# Patient Record
Sex: MALE | Race: WHITE | NOT HISPANIC OR LATINO | Employment: FULL TIME | URBAN - METROPOLITAN AREA
[De-identification: names, ages, dates, MRNs, and addresses within clinical notes are randomized per-mention and may not be internally consistent; named-entity substitution may affect disease eponyms.]

---

## 2017-01-25 ENCOUNTER — ALLSCRIPTS OFFICE VISIT (OUTPATIENT)
Dept: OTHER | Facility: OTHER | Age: 32
End: 2017-01-25

## 2017-01-25 ENCOUNTER — TRANSCRIBE ORDERS (OUTPATIENT)
Dept: ADMINISTRATIVE | Facility: HOSPITAL | Age: 32
End: 2017-01-25

## 2017-01-25 DIAGNOSIS — M25.552 PAIN IN LEFT HIP: ICD-10-CM

## 2017-01-25 DIAGNOSIS — M16.12 PRIMARY OSTEOARTHRITIS OF LEFT HIP: ICD-10-CM

## 2017-01-25 DIAGNOSIS — M25.552 LEFT HIP PAIN: Primary | ICD-10-CM

## 2017-02-15 RX ORDER — CYCLOBENZAPRINE HCL 10 MG
10 TABLET ORAL 2 TIMES DAILY PRN
COMMUNITY
End: 2018-10-12 | Stop reason: SDUPTHER

## 2017-02-16 ENCOUNTER — HOSPITAL ENCOUNTER (OUTPATIENT)
Dept: MRI IMAGING | Facility: HOSPITAL | Age: 32
Discharge: HOME | End: 2017-02-16
Attending: ORTHOPAEDIC SURGERY
Payer: COMMERCIAL

## 2017-02-16 ENCOUNTER — HOSPITAL ENCOUNTER (OUTPATIENT)
Dept: RADIOLOGY | Facility: HOSPITAL | Age: 32
Discharge: HOME | End: 2017-02-16
Attending: ORTHOPAEDIC SURGERY
Payer: COMMERCIAL

## 2017-02-16 DIAGNOSIS — M25.552 LEFT HIP PAIN: ICD-10-CM

## 2017-02-16 DIAGNOSIS — M25.552 PAIN IN LEFT HIP: ICD-10-CM

## 2017-02-16 PROCEDURE — A9585 GADOBUTROL INJECTION: HCPCS | Performed by: ORTHOPAEDIC SURGERY

## 2017-02-16 PROCEDURE — 20610 DRAIN/INJ JOINT/BURSA W/O US: CPT

## 2017-02-16 PROCEDURE — 77002 NEEDLE LOCALIZATION BY XRAY: CPT

## 2017-02-16 PROCEDURE — 73722 MRI JOINT OF LWR EXTR W/DYE: CPT

## 2017-02-16 RX ORDER — 0.9 % SODIUM CHLORIDE 0.9 %
2.5 VIAL (ML) INJECTION
Status: COMPLETED | OUTPATIENT
Start: 2017-02-16 | End: 2017-02-16

## 2017-02-16 RX ORDER — LIDOCAINE HYDROCHLORIDE 10 MG/ML
7 INJECTION, SOLUTION INFILTRATION; PERINEURAL
Status: COMPLETED | OUTPATIENT
Start: 2017-02-16 | End: 2017-02-16

## 2017-02-16 RX ADMIN — GADOBUTROL 0.2 ML: 604.72 INJECTION INTRAVENOUS at 13:58

## 2017-02-16 RX ADMIN — SODIUM CHLORIDE 2.5 ML: 9 INJECTION, SOLUTION INTRAMUSCULAR; INTRAVENOUS; SUBCUTANEOUS at 13:10

## 2017-02-16 RX ADMIN — LIDOCAINE HYDROCHLORIDE 7 ML: 10 INJECTION, SOLUTION INFILTRATION; PERINEURAL at 13:10

## 2017-02-16 RX ADMIN — IOHEXOL 2 ML: 300 INJECTION, SOLUTION INTRAVENOUS at 13:10

## 2017-02-21 ENCOUNTER — ALLSCRIPTS OFFICE VISIT (OUTPATIENT)
Dept: OTHER | Facility: OTHER | Age: 32
End: 2017-02-21

## 2017-02-21 ENCOUNTER — TRANSCRIBE ORDERS (OUTPATIENT)
Dept: ADMINISTRATIVE | Facility: HOSPITAL | Age: 32
End: 2017-02-21

## 2017-02-21 DIAGNOSIS — M16.12 PRIMARY OSTEOARTHRITIS OF LEFT HIP: Primary | ICD-10-CM

## 2017-02-27 ENCOUNTER — HOSPITAL ENCOUNTER (OUTPATIENT)
Dept: RADIOLOGY | Facility: HOSPITAL | Age: 32
Discharge: HOME | End: 2017-02-27
Attending: ORTHOPAEDIC SURGERY | Admitting: RADIOLOGY
Payer: COMMERCIAL

## 2017-02-27 DIAGNOSIS — M16.12 PRIMARY OSTEOARTHRITIS OF LEFT HIP: ICD-10-CM

## 2017-02-27 PROCEDURE — 77002 NEEDLE LOCALIZATION BY XRAY: CPT

## 2017-02-27 PROCEDURE — 20610 DRAIN/INJ JOINT/BURSA W/O US: CPT

## 2017-02-27 RX ORDER — LIDOCAINE HYDROCHLORIDE 10 MG/ML
3 INJECTION, SOLUTION EPIDURAL; INFILTRATION; INTRACAUDAL; PERINEURAL ONCE
Status: COMPLETED | OUTPATIENT
Start: 2017-02-27 | End: 2017-02-27

## 2017-02-27 RX ORDER — BUPIVACAINE HYDROCHLORIDE 2.5 MG/ML
3 INJECTION, SOLUTION EPIDURAL; INFILTRATION; INTRACAUDAL ONCE
Status: COMPLETED | OUTPATIENT
Start: 2017-02-27 | End: 2017-02-27

## 2017-02-27 RX ORDER — METHYLPREDNISOLONE ACETATE 80 MG/ML
80 INJECTION, SUSPENSION INTRA-ARTICULAR; INTRALESIONAL; INTRAMUSCULAR; SOFT TISSUE ONCE
Status: COMPLETED | OUTPATIENT
Start: 2017-02-27 | End: 2017-02-27

## 2017-02-27 RX ADMIN — BUPIVACAINE HYDROCHLORIDE 3 ML: 2.5 INJECTION, SOLUTION EPIDURAL; INFILTRATION; INTRACAUDAL; PERINEURAL at 14:15

## 2017-02-27 RX ADMIN — IOHEXOL 3 ML: 300 INJECTION, SOLUTION INTRAVENOUS at 14:16

## 2017-02-27 RX ADMIN — LIDOCAINE HYDROCHLORIDE 3 ML: 10 INJECTION, SOLUTION EPIDURAL; INFILTRATION; INTRACAUDAL; PERINEURAL at 14:16

## 2017-02-27 RX ADMIN — METHYLPREDNISOLONE ACETATE 80 MG: 80 INJECTION, SUSPENSION INTRA-ARTICULAR; INTRALESIONAL; INTRAMUSCULAR; SOFT TISSUE at 14:16

## 2017-04-25 ENCOUNTER — ALLSCRIPTS OFFICE VISIT (OUTPATIENT)
Dept: OTHER | Facility: OTHER | Age: 32
End: 2017-04-25

## 2017-05-15 ENCOUNTER — APPOINTMENT (OUTPATIENT)
Dept: PREADMISSION TESTING | Facility: HOSPITAL | Age: 32
End: 2017-05-15
Payer: COMMERCIAL

## 2017-05-15 ENCOUNTER — APPOINTMENT (OUTPATIENT)
Dept: LAB | Facility: CLINIC | Age: 32
End: 2017-05-15
Payer: COMMERCIAL

## 2017-05-15 ENCOUNTER — TRANSCRIBE ORDERS (OUTPATIENT)
Dept: LAB | Facility: CLINIC | Age: 32
End: 2017-05-15

## 2017-05-15 DIAGNOSIS — S73.192A TEAR OF LEFT ACETABULAR LABRUM, INITIAL ENCOUNTER: Primary | ICD-10-CM

## 2017-05-15 DIAGNOSIS — S73.192A TEAR OF LEFT ACETABULAR LABRUM, INITIAL ENCOUNTER: ICD-10-CM

## 2017-05-15 DIAGNOSIS — M16.12 PRIMARY OSTEOARTHRITIS OF LEFT HIP: ICD-10-CM

## 2017-05-15 DIAGNOSIS — M25.552 PAIN IN LEFT HIP: ICD-10-CM

## 2017-05-15 LAB
ANION GAP SERPL CALCULATED.3IONS-SCNC: 9 MMOL/L (ref 4–13)
BASOPHILS # BLD AUTO: 0.02 THOUSANDS/ΜL (ref 0–0.1)
BASOPHILS NFR BLD AUTO: 0 % (ref 0–1)
BUN SERPL-MCNC: 15 MG/DL (ref 5–25)
CALCIUM SERPL-MCNC: 9.4 MG/DL (ref 8.3–10.1)
CHLORIDE SERPL-SCNC: 101 MMOL/L (ref 100–108)
CO2 SERPL-SCNC: 29 MMOL/L (ref 21–32)
CREAT SERPL-MCNC: 0.96 MG/DL (ref 0.6–1.3)
CRP SERPL QL: 25.6 MG/L
EOSINOPHIL # BLD AUTO: 0.32 THOUSAND/ΜL (ref 0–0.61)
EOSINOPHIL NFR BLD AUTO: 4 % (ref 0–6)
ERYTHROCYTE [DISTWIDTH] IN BLOOD BY AUTOMATED COUNT: 15.1 % (ref 11.6–15.1)
ERYTHROCYTE [SEDIMENTATION RATE] IN BLOOD: 12 MM/HOUR (ref 0–10)
GFR SERPL CREATININE-BSD FRML MDRD: >60 ML/MIN/1.73SQ M
GLUCOSE SERPL-MCNC: 93 MG/DL (ref 65–140)
HCT VFR BLD AUTO: 46.3 % (ref 36.5–49.3)
HGB BLD-MCNC: 14.8 G/DL (ref 12–17)
LYMPHOCYTES # BLD AUTO: 1.77 THOUSANDS/ΜL (ref 0.6–4.47)
LYMPHOCYTES NFR BLD AUTO: 22 % (ref 14–44)
MCH RBC QN AUTO: 25.6 PG (ref 26.8–34.3)
MCHC RBC AUTO-ENTMCNC: 32 G/DL (ref 31.4–37.4)
MCV RBC AUTO: 80 FL (ref 82–98)
MONOCYTES # BLD AUTO: 0.81 THOUSAND/ΜL (ref 0.17–1.22)
MONOCYTES NFR BLD AUTO: 10 % (ref 4–12)
NEUTROPHILS # BLD AUTO: 4.97 THOUSANDS/ΜL (ref 1.85–7.62)
NEUTS SEG NFR BLD AUTO: 64 % (ref 43–75)
PLATELET # BLD AUTO: 332 THOUSANDS/UL (ref 149–390)
PMV BLD AUTO: 8.4 FL (ref 8.9–12.7)
POTASSIUM SERPL-SCNC: 3.9 MMOL/L (ref 3.5–5.3)
RBC # BLD AUTO: 5.78 MILLION/UL (ref 3.88–5.62)
SODIUM SERPL-SCNC: 139 MMOL/L (ref 136–145)
WBC # BLD AUTO: 7.89 THOUSAND/UL (ref 4.31–10.16)

## 2017-05-15 PROCEDURE — 36415 COLL VENOUS BLD VENIPUNCTURE: CPT

## 2017-05-15 PROCEDURE — 85025 COMPLETE CBC W/AUTO DIFF WBC: CPT

## 2017-05-15 PROCEDURE — 85652 RBC SED RATE AUTOMATED: CPT

## 2017-05-15 PROCEDURE — 86430 RHEUMATOID FACTOR TEST QUAL: CPT

## 2017-05-15 PROCEDURE — 86038 ANTINUCLEAR ANTIBODIES: CPT

## 2017-05-15 PROCEDURE — 86200 CCP ANTIBODY: CPT

## 2017-05-15 PROCEDURE — 81374 HLA I TYPING 1 ANTIGEN LR: CPT

## 2017-05-15 PROCEDURE — 86140 C-REACTIVE PROTEIN: CPT

## 2017-05-15 PROCEDURE — 80048 BASIC METABOLIC PNL TOTAL CA: CPT

## 2017-05-16 LAB — RHEUMATOID FACT SER QL LA: NEGATIVE

## 2017-05-17 LAB
CCP IGA+IGG SERPL IA-ACNC: 25 UNITS (ref 0–19)
RYE IGE QN: NEGATIVE

## 2017-05-22 LAB — HLA-B27 QL NAA+PROBE: POSITIVE

## 2017-06-01 ENCOUNTER — APPOINTMENT (OUTPATIENT)
Dept: RADIOLOGY | Facility: HOSPITAL | Age: 32
End: 2017-06-01
Payer: COMMERCIAL

## 2017-06-01 ENCOUNTER — ANESTHESIA EVENT (OUTPATIENT)
Dept: PERIOP | Facility: HOSPITAL | Age: 32
End: 2017-06-01
Payer: COMMERCIAL

## 2017-06-01 ENCOUNTER — ANESTHESIA (OUTPATIENT)
Dept: PERIOP | Facility: HOSPITAL | Age: 32
End: 2017-06-01
Payer: COMMERCIAL

## 2017-06-01 ENCOUNTER — HOSPITAL ENCOUNTER (OUTPATIENT)
Facility: HOSPITAL | Age: 32
Setting detail: OUTPATIENT SURGERY
Discharge: HOME | End: 2017-06-01
Attending: ORTHOPAEDIC SURGERY | Admitting: ORTHOPAEDIC SURGERY
Payer: COMMERCIAL

## 2017-06-01 VITALS
TEMPERATURE: 97.4 F | RESPIRATION RATE: 18 BRPM | HEIGHT: 72 IN | SYSTOLIC BLOOD PRESSURE: 110 MMHG | HEART RATE: 77 BPM | DIASTOLIC BLOOD PRESSURE: 56 MMHG | OXYGEN SATURATION: 98 % | WEIGHT: 145 LBS | BODY MASS INDEX: 19.64 KG/M2

## 2017-06-01 PROCEDURE — 73501 X-RAY EXAM HIP UNI 1 VIEW: CPT

## 2017-06-01 RX ORDER — HYDROMORPHONE HYDROCHLORIDE 2 MG/ML
INJECTION, SOLUTION INTRAMUSCULAR; INTRAVENOUS; SUBCUTANEOUS AS NEEDED
Status: DISCONTINUED | OUTPATIENT
Start: 2017-06-01 | End: 2017-06-01 | Stop reason: SURG

## 2017-06-01 RX ORDER — LIDOCAINE HYDROCHLORIDE 10 MG/ML
INJECTION, SOLUTION INFILTRATION; PERINEURAL AS NEEDED
Status: DISCONTINUED | OUTPATIENT
Start: 2017-06-01 | End: 2017-06-01

## 2017-06-01 RX ORDER — FENTANYL CITRATE/PF 50 MCG/ML
25 SYRINGE (ML) INJECTION
Status: DISCONTINUED | OUTPATIENT
Start: 2017-06-01 | End: 2017-06-01 | Stop reason: HOSPADM

## 2017-06-01 RX ORDER — MORPHINE SULFATE 2 MG/ML
2 INJECTION, SOLUTION INTRAMUSCULAR; INTRAVENOUS EVERY 2 HOUR PRN
Status: DISCONTINUED | OUTPATIENT
Start: 2017-06-01 | End: 2017-06-01 | Stop reason: HOSPADM

## 2017-06-01 RX ORDER — OXYCODONE HYDROCHLORIDE AND ACETAMINOPHEN 5; 325 MG/1; MG/1
2 TABLET ORAL EVERY 4 HOURS PRN
Status: DISCONTINUED | OUTPATIENT
Start: 2017-06-01 | End: 2017-06-01 | Stop reason: HOSPADM

## 2017-06-01 RX ORDER — PREGABALIN 75 MG/1
75 CAPSULE ORAL ONCE
Status: COMPLETED | OUTPATIENT
Start: 2017-06-01 | End: 2017-06-01

## 2017-06-01 RX ORDER — OXYCODONE HYDROCHLORIDE AND ACETAMINOPHEN 5; 325 MG/1; MG/1
TABLET ORAL
Status: COMPLETED
Start: 2017-06-01 | End: 2017-06-01

## 2017-06-01 RX ORDER — KETAMINE HYDROCHLORIDE 50 MG/ML
INJECTION, SOLUTION, CONCENTRATE INTRAMUSCULAR; INTRAVENOUS AS NEEDED
Status: DISCONTINUED | OUTPATIENT
Start: 2017-06-01 | End: 2017-06-01 | Stop reason: SURG

## 2017-06-01 RX ORDER — SODIUM CHLORIDE, SODIUM LACTATE, POTASSIUM CHLORIDE, CALCIUM CHLORIDE 600; 310; 30; 20 MG/100ML; MG/100ML; MG/100ML; MG/100ML
50 INJECTION, SOLUTION INTRAVENOUS CONTINUOUS
Status: DISCONTINUED | OUTPATIENT
Start: 2017-06-01 | End: 2017-06-01 | Stop reason: HOSPADM

## 2017-06-01 RX ORDER — ONDANSETRON 2 MG/ML
4 INJECTION INTRAMUSCULAR; INTRAVENOUS EVERY 4 HOURS PRN
Status: DISCONTINUED | OUTPATIENT
Start: 2017-06-01 | End: 2017-06-01 | Stop reason: HOSPADM

## 2017-06-01 RX ORDER — ACETAMINOPHEN 325 MG/1
975 TABLET ORAL ONCE
Status: COMPLETED | OUTPATIENT
Start: 2017-06-01 | End: 2017-06-01

## 2017-06-01 RX ORDER — FENTANYL CITRATE 50 UG/ML
INJECTION, SOLUTION INTRAMUSCULAR; INTRAVENOUS AS NEEDED
Status: DISCONTINUED | OUTPATIENT
Start: 2017-06-01 | End: 2017-06-01 | Stop reason: SURG

## 2017-06-01 RX ORDER — SODIUM CHLORIDE, SODIUM LACTATE, POTASSIUM CHLORIDE, CALCIUM CHLORIDE 600; 310; 30; 20 MG/100ML; MG/100ML; MG/100ML; MG/100ML
50 INJECTION, SOLUTION INTRAVENOUS CONTINUOUS
Status: CANCELLED | OUTPATIENT
Start: 2017-06-01

## 2017-06-01 RX ORDER — ONDANSETRON 2 MG/ML
INJECTION INTRAMUSCULAR; INTRAVENOUS AS NEEDED
Status: DISCONTINUED | OUTPATIENT
Start: 2017-06-01 | End: 2017-06-01 | Stop reason: SURG

## 2017-06-01 RX ORDER — MIDAZOLAM HYDROCHLORIDE 1 MG/ML
INJECTION INTRAMUSCULAR; INTRAVENOUS AS NEEDED
Status: DISCONTINUED | OUTPATIENT
Start: 2017-06-01 | End: 2017-06-01 | Stop reason: SURG

## 2017-06-01 RX ORDER — KETOROLAC TROMETHAMINE 30 MG/ML
INJECTION, SOLUTION INTRAMUSCULAR; INTRAVENOUS AS NEEDED
Status: DISCONTINUED | OUTPATIENT
Start: 2017-06-01 | End: 2017-06-01 | Stop reason: SURG

## 2017-06-01 RX ORDER — PROPOFOL 10 MG/ML
INJECTION, EMULSION INTRAVENOUS AS NEEDED
Status: DISCONTINUED | OUTPATIENT
Start: 2017-06-01 | End: 2017-06-01 | Stop reason: SURG

## 2017-06-01 RX ORDER — DEXMEDETOMIDINE HYDROCHLORIDE 100 UG/ML
INJECTION, SOLUTION INTRAVENOUS AS NEEDED
Status: DISCONTINUED | OUTPATIENT
Start: 2017-06-01 | End: 2017-06-01 | Stop reason: SURG

## 2017-06-01 RX ORDER — OXYCODONE HYDROCHLORIDE AND ACETAMINOPHEN 5; 325 MG/1; MG/1
TABLET ORAL
Qty: 40 TABLET | Refills: 0 | Status: SHIPPED | OUTPATIENT
Start: 2017-06-01 | End: 2018-07-10

## 2017-06-01 RX ORDER — LIDOCAINE HYDROCHLORIDE AND EPINEPHRINE 10; 10 MG/ML; UG/ML
INJECTION, SOLUTION INFILTRATION; PERINEURAL AS NEEDED
Status: DISCONTINUED | OUTPATIENT
Start: 2017-06-01 | End: 2017-06-01 | Stop reason: HOSPADM

## 2017-06-01 RX ORDER — ACETAMINOPHEN 325 MG/1
650 TABLET ORAL EVERY 4 HOURS PRN
Status: DISCONTINUED | OUTPATIENT
Start: 2017-06-01 | End: 2017-06-01 | Stop reason: HOSPADM

## 2017-06-01 RX ORDER — ONDANSETRON 2 MG/ML
4 INJECTION INTRAMUSCULAR; INTRAVENOUS EVERY 6 HOURS PRN
Status: DISCONTINUED | OUTPATIENT
Start: 2017-06-01 | End: 2017-06-01 | Stop reason: HOSPADM

## 2017-06-01 RX ORDER — GLYCOPYRROLATE 0.2 MG/ML
INJECTION INTRAMUSCULAR; INTRAVENOUS AS NEEDED
Status: DISCONTINUED | OUTPATIENT
Start: 2017-06-01 | End: 2017-06-01 | Stop reason: SURG

## 2017-06-01 RX ORDER — LIDOCAINE HYDROCHLORIDE 10 MG/ML
INJECTION, SOLUTION INFILTRATION; PERINEURAL AS NEEDED
Status: DISCONTINUED | OUTPATIENT
Start: 2017-06-01 | End: 2017-06-01 | Stop reason: SURG

## 2017-06-01 RX ADMIN — KETAMINE HYDROCHLORIDE 30 MG: 50 INJECTION INTRAMUSCULAR; INTRAVENOUS at 12:50

## 2017-06-01 RX ADMIN — OXYCODONE HYDROCHLORIDE AND ACETAMINOPHEN 1 TABLET: 5; 325 TABLET ORAL at 14:57

## 2017-06-01 RX ADMIN — CEFAZOLIN SODIUM 1000 MG: 1 SOLUTION INTRAVENOUS at 12:45

## 2017-06-01 RX ADMIN — FENTANYL CITRATE 25 MCG: 50 INJECTION INTRAMUSCULAR; INTRAVENOUS at 12:55

## 2017-06-01 RX ADMIN — HYDROMORPHONE HYDROCHLORIDE 0.2 MG: 2 INJECTION, SOLUTION INTRAMUSCULAR; INTRAVENOUS; SUBCUTANEOUS at 13:44

## 2017-06-01 RX ADMIN — DEXAMETHASONE SODIUM PHOSPHATE 10 MG: 10 INJECTION INTRAMUSCULAR; INTRAVENOUS at 12:47

## 2017-06-01 RX ADMIN — FENTANYL CITRATE 25 MCG: 50 INJECTION INTRAMUSCULAR; INTRAVENOUS at 12:59

## 2017-06-01 RX ADMIN — PREGABALIN 75 MG: 75 CAPSULE ORAL at 11:59

## 2017-06-01 RX ADMIN — PROPOFOL 200 MG: 10 INJECTION, EMULSION INTRAVENOUS at 12:49

## 2017-06-01 RX ADMIN — FENTANYL CITRATE 25 MCG: 50 INJECTION INTRAMUSCULAR; INTRAVENOUS at 13:30

## 2017-06-01 RX ADMIN — HYDROMORPHONE HYDROCHLORIDE 0.4 MG: 2 INJECTION, SOLUTION INTRAMUSCULAR; INTRAVENOUS; SUBCUTANEOUS at 13:56

## 2017-06-01 RX ADMIN — ACETAMINOPHEN 975 MG: 325 TABLET, FILM COATED ORAL at 11:59

## 2017-06-01 RX ADMIN — KETOROLAC TROMETHAMINE 30 MG: 30 INJECTION, SOLUTION INTRAMUSCULAR at 13:47

## 2017-06-01 RX ADMIN — HYDROMORPHONE HYDROCHLORIDE 0.2 MG: 2 INJECTION, SOLUTION INTRAMUSCULAR; INTRAVENOUS; SUBCUTANEOUS at 13:46

## 2017-06-01 RX ADMIN — MIDAZOLAM HYDROCHLORIDE 2 MG: 1 INJECTION, SOLUTION INTRAMUSCULAR; INTRAVENOUS at 12:46

## 2017-06-01 RX ADMIN — FENTANYL CITRATE 25 MCG: 50 INJECTION INTRAMUSCULAR; INTRAVENOUS at 13:25

## 2017-06-01 RX ADMIN — HYDROMORPHONE HYDROCHLORIDE 0.2 MG: 2 INJECTION, SOLUTION INTRAMUSCULAR; INTRAVENOUS; SUBCUTANEOUS at 13:32

## 2017-06-01 RX ADMIN — PROPOFOL 30 MG: 10 INJECTION, EMULSION INTRAVENOUS at 13:10

## 2017-06-01 RX ADMIN — SODIUM CHLORIDE, SODIUM LACTATE, POTASSIUM CHLORIDE, AND CALCIUM CHLORIDE: .6; .31; .03; .02 INJECTION, SOLUTION INTRAVENOUS at 12:32

## 2017-06-01 RX ADMIN — DEXMEDETOMIDINE 32 MCG: 100 INJECTION, SOLUTION, CONCENTRATE INTRAVENOUS at 12:56

## 2017-06-01 RX ADMIN — LIDOCAINE HYDROCHLORIDE 25 MG: 10 INJECTION, SOLUTION INFILTRATION; PERINEURAL at 12:49

## 2017-06-01 RX ADMIN — ONDANSETRON 4 MG: 2 INJECTION INTRAMUSCULAR; INTRAVENOUS at 12:49

## 2017-06-01 RX ADMIN — GLYCOPYRROLATE 0.2 MG: 0.2 INJECTION INTRAMUSCULAR; INTRAVENOUS at 12:46

## 2017-06-13 ENCOUNTER — ALLSCRIPTS OFFICE VISIT (OUTPATIENT)
Dept: OTHER | Facility: OTHER | Age: 32
End: 2017-06-13

## 2017-06-13 DIAGNOSIS — M16.12 PRIMARY OSTEOARTHRITIS OF LEFT HIP: ICD-10-CM

## 2017-06-13 DIAGNOSIS — S73.192D OTHER SPRAIN OF LEFT HIP, SUBSEQUENT ENCOUNTER: ICD-10-CM

## 2017-06-19 ENCOUNTER — GENERIC CONVERSION - ENCOUNTER (OUTPATIENT)
Dept: OTHER | Facility: OTHER | Age: 32
End: 2017-06-19

## 2017-06-19 ENCOUNTER — APPOINTMENT (OUTPATIENT)
Dept: PHYSICAL THERAPY | Age: 32
End: 2017-06-19
Payer: COMMERCIAL

## 2017-06-19 DIAGNOSIS — M16.12 PRIMARY OSTEOARTHRITIS OF LEFT HIP: ICD-10-CM

## 2017-06-19 DIAGNOSIS — S73.192D OTHER SPRAIN OF LEFT HIP, SUBSEQUENT ENCOUNTER: ICD-10-CM

## 2017-06-19 PROCEDURE — 97162 PT EVAL MOD COMPLEX 30 MIN: CPT

## 2017-06-19 PROCEDURE — 97110 THERAPEUTIC EXERCISES: CPT

## 2017-06-21 ENCOUNTER — APPOINTMENT (OUTPATIENT)
Dept: PHYSICAL THERAPY | Age: 32
End: 2017-06-21
Payer: COMMERCIAL

## 2017-06-21 PROCEDURE — 97140 MANUAL THERAPY 1/> REGIONS: CPT

## 2017-06-21 PROCEDURE — 97110 THERAPEUTIC EXERCISES: CPT

## 2017-06-21 PROCEDURE — 97112 NEUROMUSCULAR REEDUCATION: CPT

## 2017-06-23 ENCOUNTER — APPOINTMENT (OUTPATIENT)
Dept: PHYSICAL THERAPY | Age: 32
End: 2017-06-23
Payer: COMMERCIAL

## 2017-06-23 PROCEDURE — 97140 MANUAL THERAPY 1/> REGIONS: CPT

## 2017-06-23 PROCEDURE — 97110 THERAPEUTIC EXERCISES: CPT

## 2017-06-23 PROCEDURE — 97112 NEUROMUSCULAR REEDUCATION: CPT

## 2017-06-26 ENCOUNTER — APPOINTMENT (OUTPATIENT)
Dept: PHYSICAL THERAPY | Age: 32
End: 2017-06-26
Payer: COMMERCIAL

## 2017-06-26 PROCEDURE — 97112 NEUROMUSCULAR REEDUCATION: CPT

## 2017-06-26 PROCEDURE — 97140 MANUAL THERAPY 1/> REGIONS: CPT

## 2017-06-26 PROCEDURE — 97110 THERAPEUTIC EXERCISES: CPT

## 2017-06-27 ENCOUNTER — APPOINTMENT (OUTPATIENT)
Dept: PHYSICAL THERAPY | Age: 32
End: 2017-06-27
Payer: COMMERCIAL

## 2017-06-27 PROCEDURE — 97112 NEUROMUSCULAR REEDUCATION: CPT

## 2017-06-27 PROCEDURE — 97140 MANUAL THERAPY 1/> REGIONS: CPT

## 2017-06-27 PROCEDURE — 97110 THERAPEUTIC EXERCISES: CPT

## 2017-06-28 ENCOUNTER — GENERIC CONVERSION - ENCOUNTER (OUTPATIENT)
Dept: OTHER | Facility: OTHER | Age: 32
End: 2017-06-28

## 2017-06-29 ENCOUNTER — APPOINTMENT (OUTPATIENT)
Dept: PHYSICAL THERAPY | Age: 32
End: 2017-06-29
Payer: COMMERCIAL

## 2017-07-03 ENCOUNTER — APPOINTMENT (OUTPATIENT)
Dept: PHYSICAL THERAPY | Age: 32
End: 2017-07-03
Payer: COMMERCIAL

## 2017-07-03 PROCEDURE — 97110 THERAPEUTIC EXERCISES: CPT

## 2017-07-03 PROCEDURE — 97140 MANUAL THERAPY 1/> REGIONS: CPT

## 2017-07-03 PROCEDURE — 97112 NEUROMUSCULAR REEDUCATION: CPT

## 2017-07-05 ENCOUNTER — APPOINTMENT (OUTPATIENT)
Dept: PHYSICAL THERAPY | Age: 32
End: 2017-07-05
Payer: COMMERCIAL

## 2017-07-05 PROCEDURE — 97110 THERAPEUTIC EXERCISES: CPT

## 2017-07-05 PROCEDURE — 97140 MANUAL THERAPY 1/> REGIONS: CPT

## 2017-07-05 PROCEDURE — 97112 NEUROMUSCULAR REEDUCATION: CPT

## 2017-07-07 ENCOUNTER — APPOINTMENT (OUTPATIENT)
Dept: PHYSICAL THERAPY | Age: 32
End: 2017-07-07
Payer: COMMERCIAL

## 2017-07-07 PROCEDURE — 97110 THERAPEUTIC EXERCISES: CPT

## 2017-07-07 PROCEDURE — 97112 NEUROMUSCULAR REEDUCATION: CPT

## 2017-07-07 PROCEDURE — 97140 MANUAL THERAPY 1/> REGIONS: CPT

## 2017-07-10 ENCOUNTER — APPOINTMENT (OUTPATIENT)
Dept: PHYSICAL THERAPY | Age: 32
End: 2017-07-10
Payer: COMMERCIAL

## 2017-07-10 PROCEDURE — 97140 MANUAL THERAPY 1/> REGIONS: CPT

## 2017-07-10 PROCEDURE — 97110 THERAPEUTIC EXERCISES: CPT

## 2017-07-10 PROCEDURE — 97112 NEUROMUSCULAR REEDUCATION: CPT

## 2017-07-12 ENCOUNTER — APPOINTMENT (OUTPATIENT)
Dept: PHYSICAL THERAPY | Age: 32
End: 2017-07-12
Payer: COMMERCIAL

## 2017-07-12 PROCEDURE — 97140 MANUAL THERAPY 1/> REGIONS: CPT

## 2017-07-12 PROCEDURE — 97112 NEUROMUSCULAR REEDUCATION: CPT

## 2017-07-12 PROCEDURE — 97110 THERAPEUTIC EXERCISES: CPT

## 2017-07-14 ENCOUNTER — APPOINTMENT (OUTPATIENT)
Dept: PHYSICAL THERAPY | Age: 32
End: 2017-07-14
Payer: COMMERCIAL

## 2017-07-14 PROCEDURE — 97112 NEUROMUSCULAR REEDUCATION: CPT

## 2017-07-14 PROCEDURE — 97110 THERAPEUTIC EXERCISES: CPT

## 2017-07-14 PROCEDURE — 97140 MANUAL THERAPY 1/> REGIONS: CPT

## 2017-07-17 ENCOUNTER — APPOINTMENT (OUTPATIENT)
Dept: PHYSICAL THERAPY | Age: 32
End: 2017-07-17
Payer: COMMERCIAL

## 2017-07-17 ENCOUNTER — GENERIC CONVERSION - ENCOUNTER (OUTPATIENT)
Dept: OTHER | Facility: OTHER | Age: 32
End: 2017-07-17

## 2017-07-17 PROCEDURE — 97112 NEUROMUSCULAR REEDUCATION: CPT

## 2017-07-17 PROCEDURE — 97110 THERAPEUTIC EXERCISES: CPT

## 2017-07-17 PROCEDURE — 97140 MANUAL THERAPY 1/> REGIONS: CPT

## 2017-07-19 ENCOUNTER — APPOINTMENT (OUTPATIENT)
Dept: PHYSICAL THERAPY | Age: 32
End: 2017-07-19
Payer: COMMERCIAL

## 2017-07-19 PROCEDURE — 97140 MANUAL THERAPY 1/> REGIONS: CPT

## 2017-07-19 PROCEDURE — 97112 NEUROMUSCULAR REEDUCATION: CPT

## 2017-07-19 PROCEDURE — 97110 THERAPEUTIC EXERCISES: CPT

## 2017-07-21 ENCOUNTER — ALLSCRIPTS OFFICE VISIT (OUTPATIENT)
Dept: OTHER | Facility: OTHER | Age: 32
End: 2017-07-21

## 2017-07-21 ENCOUNTER — TRANSCRIBE ORDERS (OUTPATIENT)
Dept: LAB | Facility: CLINIC | Age: 32
End: 2017-07-21

## 2017-07-21 ENCOUNTER — APPOINTMENT (OUTPATIENT)
Dept: LAB | Facility: CLINIC | Age: 32
End: 2017-07-21
Payer: COMMERCIAL

## 2017-07-21 DIAGNOSIS — M45.9 ANKYLOSING SPONDYLITIS OF SITE IN SPINE (HCC): ICD-10-CM

## 2017-07-21 DIAGNOSIS — S73.192D OTHER SPRAIN OF LEFT HIP, SUBSEQUENT ENCOUNTER: ICD-10-CM

## 2017-07-21 LAB
BASOPHILS # BLD AUTO: 0.02 THOUSANDS/ΜL (ref 0–0.1)
BASOPHILS NFR BLD AUTO: 0 % (ref 0–1)
CRP SERPL QL: 57.4 MG/L
EOSINOPHIL # BLD AUTO: 0.16 THOUSAND/ΜL (ref 0–0.61)
EOSINOPHIL NFR BLD AUTO: 2 % (ref 0–6)
ERYTHROCYTE [DISTWIDTH] IN BLOOD BY AUTOMATED COUNT: 13.8 % (ref 11.6–15.1)
ERYTHROCYTE [SEDIMENTATION RATE] IN BLOOD: 20 MM/HOUR (ref 0–10)
HCT VFR BLD AUTO: 44.5 % (ref 36.5–49.3)
HGB BLD-MCNC: 14.2 G/DL (ref 12–17)
LYMPHOCYTES # BLD AUTO: 1.49 THOUSANDS/ΜL (ref 0.6–4.47)
LYMPHOCYTES NFR BLD AUTO: 17 % (ref 14–44)
MCH RBC QN AUTO: 25 PG (ref 26.8–34.3)
MCHC RBC AUTO-ENTMCNC: 31.9 G/DL (ref 31.4–37.4)
MCV RBC AUTO: 78 FL (ref 82–98)
MONOCYTES # BLD AUTO: 1 THOUSAND/ΜL (ref 0.17–1.22)
MONOCYTES NFR BLD AUTO: 11 % (ref 4–12)
NEUTROPHILS # BLD AUTO: 6.25 THOUSANDS/ΜL (ref 1.85–7.62)
NEUTS SEG NFR BLD AUTO: 70 % (ref 43–75)
PLATELET # BLD AUTO: 383 THOUSANDS/UL (ref 149–390)
PMV BLD AUTO: 8.5 FL (ref 8.9–12.7)
RBC # BLD AUTO: 5.68 MILLION/UL (ref 3.88–5.62)
WBC # BLD AUTO: 8.92 THOUSAND/UL (ref 4.31–10.16)

## 2017-07-21 PROCEDURE — 81374 HLA I TYPING 1 ANTIGEN LR: CPT

## 2017-07-21 PROCEDURE — 85025 COMPLETE CBC W/AUTO DIFF WBC: CPT

## 2017-07-21 PROCEDURE — 86430 RHEUMATOID FACTOR TEST QUAL: CPT

## 2017-07-21 PROCEDURE — 86038 ANTINUCLEAR ANTIBODIES: CPT

## 2017-07-21 PROCEDURE — 85652 RBC SED RATE AUTOMATED: CPT

## 2017-07-21 PROCEDURE — 86140 C-REACTIVE PROTEIN: CPT

## 2017-07-21 PROCEDURE — 36415 COLL VENOUS BLD VENIPUNCTURE: CPT

## 2017-07-21 PROCEDURE — 86200 CCP ANTIBODY: CPT

## 2017-07-24 ENCOUNTER — APPOINTMENT (OUTPATIENT)
Dept: PHYSICAL THERAPY | Age: 32
End: 2017-07-24
Payer: COMMERCIAL

## 2017-07-24 LAB
RHEUMATOID FACT SER QL LA: NEGATIVE
RYE IGE QN: NEGATIVE

## 2017-07-24 PROCEDURE — 97112 NEUROMUSCULAR REEDUCATION: CPT

## 2017-07-24 PROCEDURE — 97110 THERAPEUTIC EXERCISES: CPT

## 2017-07-24 PROCEDURE — 97140 MANUAL THERAPY 1/> REGIONS: CPT

## 2017-07-25 ENCOUNTER — GENERIC CONVERSION - ENCOUNTER (OUTPATIENT)
Dept: OTHER | Facility: OTHER | Age: 32
End: 2017-07-25

## 2017-07-25 LAB
CCP IGA+IGG SERPL IA-ACNC: 18 UNITS (ref 0–19)
HLA-B27 QL NAA+PROBE: POSITIVE

## 2017-07-26 ENCOUNTER — APPOINTMENT (OUTPATIENT)
Dept: PHYSICAL THERAPY | Age: 32
End: 2017-07-26
Payer: COMMERCIAL

## 2017-07-26 PROCEDURE — 97140 MANUAL THERAPY 1/> REGIONS: CPT

## 2017-07-26 PROCEDURE — 97110 THERAPEUTIC EXERCISES: CPT

## 2017-07-26 PROCEDURE — 97112 NEUROMUSCULAR REEDUCATION: CPT

## 2017-07-28 ENCOUNTER — APPOINTMENT (OUTPATIENT)
Dept: PHYSICAL THERAPY | Age: 32
End: 2017-07-28
Payer: COMMERCIAL

## 2017-07-28 PROCEDURE — 97140 MANUAL THERAPY 1/> REGIONS: CPT

## 2017-07-28 PROCEDURE — 97112 NEUROMUSCULAR REEDUCATION: CPT

## 2017-07-28 PROCEDURE — 97110 THERAPEUTIC EXERCISES: CPT

## 2017-08-01 ENCOUNTER — APPOINTMENT (OUTPATIENT)
Dept: PHYSICAL THERAPY | Age: 32
End: 2017-08-01
Payer: COMMERCIAL

## 2017-08-01 PROCEDURE — 97140 MANUAL THERAPY 1/> REGIONS: CPT

## 2017-08-01 PROCEDURE — 97110 THERAPEUTIC EXERCISES: CPT

## 2017-08-01 PROCEDURE — 97112 NEUROMUSCULAR REEDUCATION: CPT

## 2017-08-04 ENCOUNTER — APPOINTMENT (OUTPATIENT)
Dept: PHYSICAL THERAPY | Age: 32
End: 2017-08-04
Payer: COMMERCIAL

## 2017-08-04 PROCEDURE — 97112 NEUROMUSCULAR REEDUCATION: CPT

## 2017-08-04 PROCEDURE — 97110 THERAPEUTIC EXERCISES: CPT

## 2017-08-04 PROCEDURE — 97140 MANUAL THERAPY 1/> REGIONS: CPT

## 2017-08-07 ENCOUNTER — APPOINTMENT (OUTPATIENT)
Dept: PHYSICAL THERAPY | Age: 32
End: 2017-08-07
Payer: COMMERCIAL

## 2017-08-09 ENCOUNTER — APPOINTMENT (OUTPATIENT)
Dept: PHYSICAL THERAPY | Age: 32
End: 2017-08-09
Payer: COMMERCIAL

## 2017-08-09 PROCEDURE — 97112 NEUROMUSCULAR REEDUCATION: CPT

## 2017-08-09 PROCEDURE — 97140 MANUAL THERAPY 1/> REGIONS: CPT

## 2017-08-09 PROCEDURE — 97110 THERAPEUTIC EXERCISES: CPT

## 2017-08-11 ENCOUNTER — GENERIC CONVERSION - ENCOUNTER (OUTPATIENT)
Dept: OTHER | Facility: OTHER | Age: 32
End: 2017-08-11

## 2017-08-11 ENCOUNTER — APPOINTMENT (OUTPATIENT)
Dept: PHYSICAL THERAPY | Age: 32
End: 2017-08-11
Payer: COMMERCIAL

## 2017-08-11 PROCEDURE — 97110 THERAPEUTIC EXERCISES: CPT

## 2017-08-11 PROCEDURE — 97140 MANUAL THERAPY 1/> REGIONS: CPT

## 2017-08-11 PROCEDURE — 97112 NEUROMUSCULAR REEDUCATION: CPT

## 2017-08-15 ENCOUNTER — ALLSCRIPTS OFFICE VISIT (OUTPATIENT)
Dept: OTHER | Facility: OTHER | Age: 32
End: 2017-08-15

## 2017-08-21 ENCOUNTER — HOSPITAL ENCOUNTER (OUTPATIENT)
Dept: RADIOLOGY | Facility: HOSPITAL | Age: 32
Discharge: HOME | End: 2017-08-21
Payer: COMMERCIAL

## 2017-08-21 ENCOUNTER — APPOINTMENT (OUTPATIENT)
Dept: LAB | Facility: CLINIC | Age: 32
End: 2017-08-21
Payer: COMMERCIAL

## 2017-08-21 ENCOUNTER — TRANSCRIBE ORDERS (OUTPATIENT)
Dept: LAB | Facility: CLINIC | Age: 32
End: 2017-08-21

## 2017-08-21 ENCOUNTER — TRANSCRIBE ORDERS (OUTPATIENT)
Dept: ADMINISTRATIVE | Facility: HOSPITAL | Age: 32
End: 2017-08-21

## 2017-08-21 DIAGNOSIS — M45.9 ANKYLOSING SPONDYLITIS OF SITE IN SPINE (HCC): ICD-10-CM

## 2017-08-21 LAB
25(OH)D3 SERPL-MCNC: 33.2 NG/ML (ref 30–100)
TSH SERPL DL<=0.05 MIU/L-ACNC: 1.61 UIU/ML (ref 0.36–3.74)

## 2017-08-21 PROCEDURE — 86480 TB TEST CELL IMMUN MEASURE: CPT

## 2017-08-21 PROCEDURE — 72100 X-RAY EXAM L-S SPINE 2/3 VWS: CPT

## 2017-08-21 PROCEDURE — 86705 HEP B CORE ANTIBODY IGM: CPT

## 2017-08-21 PROCEDURE — 84443 ASSAY THYROID STIM HORMONE: CPT

## 2017-08-21 PROCEDURE — 87389 HIV-1 AG W/HIV-1&-2 AB AG IA: CPT

## 2017-08-21 PROCEDURE — 36415 COLL VENOUS BLD VENIPUNCTURE: CPT

## 2017-08-21 PROCEDURE — 72202 X-RAY EXAM SI JOINTS 3/> VWS: CPT

## 2017-08-21 PROCEDURE — 86803 HEPATITIS C AB TEST: CPT

## 2017-08-21 PROCEDURE — 82306 VITAMIN D 25 HYDROXY: CPT

## 2017-08-21 PROCEDURE — 86704 HEP B CORE ANTIBODY TOTAL: CPT

## 2017-08-21 PROCEDURE — 87340 HEPATITIS B SURFACE AG IA: CPT

## 2017-08-22 LAB
HBV CORE AB SER QL: NORMAL
HBV CORE IGM SER QL: NORMAL
HBV SURFACE AG SER QL: NORMAL
HCV AB SER QL: NORMAL

## 2017-08-23 LAB
ANNOTATION COMMENT IMP: NORMAL
GAMMA INTERFERON BACKGROUND BLD IA-ACNC: 0.03 IU/ML
HIV 1+2 AB+HIV1 P24 AG SERPL QL IA: NORMAL
M TB IFN-G BLD-IMP: NEGATIVE
M TB IFN-G CD4+ BCKGRND COR BLD-ACNC: 0 IU/ML
M TB IFN-G CD4+ T-CELLS BLD-ACNC: 0.03 IU/ML
MITOGEN IGNF BLD-ACNC: 8.41 IU/ML
QUANTIFERON-TB GOLD IN TUBE: NORMAL
SERVICE CMNT-IMP: NORMAL

## 2017-10-02 DIAGNOSIS — M45.9 ANKYLOSING SPONDYLITIS OF SITE IN SPINE (HCC): ICD-10-CM

## 2017-10-02 DIAGNOSIS — Z79.1 LONG TERM CURRENT USE OF NON-STEROIDAL ANTI-INFLAMMATORIES (NSAID): ICD-10-CM

## 2017-10-04 ENCOUNTER — APPOINTMENT (OUTPATIENT)
Dept: LAB | Facility: CLINIC | Age: 32
End: 2017-10-04
Payer: COMMERCIAL

## 2017-10-04 ENCOUNTER — TRANSCRIBE ORDERS (OUTPATIENT)
Dept: LAB | Facility: CLINIC | Age: 32
End: 2017-10-04

## 2017-10-04 DIAGNOSIS — M45.9 ANKYLOSING SPONDYLITIS OF SITE IN SPINE (HCC): ICD-10-CM

## 2017-10-04 LAB
ALBUMIN SERPL BCP-MCNC: 3.7 G/DL (ref 3.5–5)
ALP SERPL-CCNC: 88 U/L (ref 46–116)
ALT SERPL W P-5'-P-CCNC: 27 U/L (ref 12–78)
ANION GAP SERPL CALCULATED.3IONS-SCNC: 5 MMOL/L (ref 4–13)
AST SERPL W P-5'-P-CCNC: 16 U/L (ref 5–45)
BASOPHILS # BLD AUTO: 0.02 THOUSANDS/ΜL (ref 0–0.1)
BASOPHILS NFR BLD AUTO: 0 % (ref 0–1)
BILIRUB SERPL-MCNC: 0.3 MG/DL (ref 0.2–1)
BUN SERPL-MCNC: 13 MG/DL (ref 5–25)
CALCIUM SERPL-MCNC: 9.7 MG/DL (ref 8.3–10.1)
CHLORIDE SERPL-SCNC: 101 MMOL/L (ref 100–108)
CO2 SERPL-SCNC: 32 MMOL/L (ref 21–32)
CREAT SERPL-MCNC: 0.91 MG/DL (ref 0.6–1.3)
CRP SERPL QL: 21.3 MG/L
EOSINOPHIL # BLD AUTO: 0.26 THOUSAND/ΜL (ref 0–0.61)
EOSINOPHIL NFR BLD AUTO: 3 % (ref 0–6)
ERYTHROCYTE [DISTWIDTH] IN BLOOD BY AUTOMATED COUNT: 15.1 % (ref 11.6–15.1)
ERYTHROCYTE [SEDIMENTATION RATE] IN BLOOD: 12 MM/HOUR (ref 0–10)
GFR SERPL CREATININE-BSD FRML MDRD: 113 ML/MIN/1.73SQ M
GLUCOSE SERPL-MCNC: 99 MG/DL (ref 65–140)
HCT VFR BLD AUTO: 45.7 % (ref 36.5–49.3)
HGB BLD-MCNC: 14.5 G/DL (ref 12–17)
LYMPHOCYTES # BLD AUTO: 2.09 THOUSANDS/ΜL (ref 0.6–4.47)
LYMPHOCYTES NFR BLD AUTO: 23 % (ref 14–44)
MCH RBC QN AUTO: 25.2 PG (ref 26.8–34.3)
MCHC RBC AUTO-ENTMCNC: 31.7 G/DL (ref 31.4–37.4)
MCV RBC AUTO: 79 FL (ref 82–98)
MONOCYTES # BLD AUTO: 0.69 THOUSAND/ΜL (ref 0.17–1.22)
MONOCYTES NFR BLD AUTO: 8 % (ref 4–12)
NEUTROPHILS # BLD AUTO: 5.99 THOUSANDS/ΜL (ref 1.85–7.62)
NEUTS SEG NFR BLD AUTO: 66 % (ref 43–75)
PLATELET # BLD AUTO: 332 THOUSANDS/UL (ref 149–390)
PMV BLD AUTO: 8.7 FL (ref 8.9–12.7)
POTASSIUM SERPL-SCNC: 4.1 MMOL/L (ref 3.5–5.3)
PROT SERPL-MCNC: 7.9 G/DL (ref 6.4–8.2)
RBC # BLD AUTO: 5.76 MILLION/UL (ref 3.88–5.62)
SODIUM SERPL-SCNC: 138 MMOL/L (ref 136–145)
WBC # BLD AUTO: 9.05 THOUSAND/UL (ref 4.31–10.16)

## 2017-10-04 PROCEDURE — 36415 COLL VENOUS BLD VENIPUNCTURE: CPT

## 2017-10-04 PROCEDURE — 85652 RBC SED RATE AUTOMATED: CPT

## 2017-10-04 PROCEDURE — 80053 COMPREHEN METABOLIC PANEL: CPT

## 2017-10-04 PROCEDURE — 85025 COMPLETE CBC W/AUTO DIFF WBC: CPT

## 2017-10-04 PROCEDURE — 86140 C-REACTIVE PROTEIN: CPT

## 2017-10-11 ENCOUNTER — ALLSCRIPTS OFFICE VISIT (OUTPATIENT)
Dept: OTHER | Facility: OTHER | Age: 32
End: 2017-10-11

## 2018-01-10 ENCOUNTER — APPOINTMENT (OUTPATIENT)
Dept: LAB | Facility: CLINIC | Age: 33
End: 2018-01-10
Payer: COMMERCIAL

## 2018-01-10 ENCOUNTER — ALLSCRIPTS OFFICE VISIT (OUTPATIENT)
Dept: OTHER | Facility: OTHER | Age: 33
End: 2018-01-10

## 2018-01-10 DIAGNOSIS — Z79.1 LONG TERM CURRENT USE OF NON-STEROIDAL ANTI-INFLAMMATORIES (NSAID): ICD-10-CM

## 2018-01-10 DIAGNOSIS — M45.9 ANKYLOSING SPONDYLITIS OF SITE IN SPINE (HCC): ICD-10-CM

## 2018-01-10 LAB
ALBUMIN SERPL BCP-MCNC: 3.5 G/DL (ref 3.5–5)
ALP SERPL-CCNC: 84 U/L (ref 46–116)
ALT SERPL W P-5'-P-CCNC: 26 U/L (ref 12–78)
ANION GAP SERPL CALCULATED.3IONS-SCNC: 6 MMOL/L (ref 4–13)
AST SERPL W P-5'-P-CCNC: 17 U/L (ref 5–45)
BASOPHILS # BLD AUTO: 0.02 THOUSANDS/ΜL (ref 0–0.1)
BASOPHILS NFR BLD AUTO: 0 % (ref 0–1)
BILIRUB SERPL-MCNC: 0.5 MG/DL (ref 0.2–1)
BUN SERPL-MCNC: 18 MG/DL (ref 5–25)
CALCIUM SERPL-MCNC: 8.9 MG/DL (ref 8.3–10.1)
CHLORIDE SERPL-SCNC: 104 MMOL/L (ref 100–108)
CO2 SERPL-SCNC: 30 MMOL/L (ref 21–32)
CREAT SERPL-MCNC: 1.05 MG/DL (ref 0.6–1.3)
CRP SERPL QL: 29.2 MG/L
EOSINOPHIL # BLD AUTO: 0.31 THOUSAND/ΜL (ref 0–0.61)
EOSINOPHIL NFR BLD AUTO: 4 % (ref 0–6)
ERYTHROCYTE [DISTWIDTH] IN BLOOD BY AUTOMATED COUNT: 14.6 % (ref 11.6–15.1)
ERYTHROCYTE [SEDIMENTATION RATE] IN BLOOD: 9 MM/HOUR (ref 0–10)
GFR SERPL CREATININE-BSD FRML MDRD: 95 ML/MIN/1.73SQ M
GLUCOSE SERPL-MCNC: 97 MG/DL (ref 65–140)
HCT VFR BLD AUTO: 44.5 % (ref 36.5–49.3)
HGB BLD-MCNC: 14.1 G/DL (ref 12–17)
LYMPHOCYTES # BLD AUTO: 1.66 THOUSANDS/ΜL (ref 0.6–4.47)
LYMPHOCYTES NFR BLD AUTO: 19 % (ref 14–44)
MCH RBC QN AUTO: 25.9 PG (ref 26.8–34.3)
MCHC RBC AUTO-ENTMCNC: 31.7 G/DL (ref 31.4–37.4)
MCV RBC AUTO: 82 FL (ref 82–98)
MONOCYTES # BLD AUTO: 0.81 THOUSAND/ΜL (ref 0.17–1.22)
MONOCYTES NFR BLD AUTO: 9 % (ref 4–12)
NEUTROPHILS # BLD AUTO: 6.02 THOUSANDS/ΜL (ref 1.85–7.62)
NEUTS SEG NFR BLD AUTO: 68 % (ref 43–75)
PLATELET # BLD AUTO: 365 THOUSANDS/UL (ref 149–390)
PMV BLD AUTO: 9 FL (ref 8.9–12.7)
POTASSIUM SERPL-SCNC: 4.3 MMOL/L (ref 3.5–5.3)
PROT SERPL-MCNC: 7.4 G/DL (ref 6.4–8.2)
RBC # BLD AUTO: 5.44 MILLION/UL (ref 3.88–5.62)
SODIUM SERPL-SCNC: 140 MMOL/L (ref 136–145)
WBC # BLD AUTO: 8.82 THOUSAND/UL (ref 4.31–10.16)

## 2018-01-10 PROCEDURE — 80053 COMPREHEN METABOLIC PANEL: CPT

## 2018-01-10 PROCEDURE — 36415 COLL VENOUS BLD VENIPUNCTURE: CPT

## 2018-01-10 PROCEDURE — 85025 COMPLETE CBC W/AUTO DIFF WBC: CPT

## 2018-01-10 PROCEDURE — 85652 RBC SED RATE AUTOMATED: CPT

## 2018-01-10 PROCEDURE — 86140 C-REACTIVE PROTEIN: CPT

## 2018-01-13 VITALS
DIASTOLIC BLOOD PRESSURE: 83 MMHG | SYSTOLIC BLOOD PRESSURE: 130 MMHG | BODY MASS INDEX: 19.1 KG/M2 | HEART RATE: 98 BPM | WEIGHT: 141 LBS | HEIGHT: 72 IN

## 2018-01-13 VITALS
DIASTOLIC BLOOD PRESSURE: 85 MMHG | BODY MASS INDEX: 19.23 KG/M2 | SYSTOLIC BLOOD PRESSURE: 136 MMHG | HEART RATE: 84 BPM | WEIGHT: 142 LBS | HEIGHT: 72 IN

## 2018-01-13 VITALS
BODY MASS INDEX: 18.96 KG/M2 | HEART RATE: 90 BPM | WEIGHT: 140 LBS | SYSTOLIC BLOOD PRESSURE: 128 MMHG | HEIGHT: 72 IN | DIASTOLIC BLOOD PRESSURE: 83 MMHG

## 2018-01-13 VITALS
WEIGHT: 142 LBS | HEART RATE: 76 BPM | BODY MASS INDEX: 19.23 KG/M2 | HEIGHT: 72 IN | DIASTOLIC BLOOD PRESSURE: 71 MMHG | SYSTOLIC BLOOD PRESSURE: 108 MMHG

## 2018-01-14 VITALS
DIASTOLIC BLOOD PRESSURE: 75 MMHG | SYSTOLIC BLOOD PRESSURE: 118 MMHG | HEART RATE: 82 BPM | WEIGHT: 142 LBS | HEIGHT: 72 IN | BODY MASS INDEX: 19.23 KG/M2

## 2018-01-17 NOTE — PROGRESS NOTES
Assessment    1  AS (ankylosing spondylitis) (720 0) (M45 9)   2  NSAID long-term use (V58 64) (Z79 1)   3  On etanercept therapy (V58 69) (Z79 899)   4  Malaise and fatigue (780 79) (R53 81,R53 83)   5  Bilateral kidney stones (592 0) (N20 0)    Plan    1  Follow-up visit in 2 months Evaluation and Treatment  Follow-up  Status: Hold For -   Scheduling  Requested for: 05TEO8334   2  Follow-up visit in 3 months Evaluation and Treatment  Follow-up  Status: Hold For -   Scheduling  Requested for: 89MXD7051   3  Call [phone] if: The pain seems worse ; Status:Complete;   Done: 55KPW5917   4  Call [phone] if: The symptoms seem worse ; Status:Complete;   Done:   81LQG3786   5  Call [phone] if: You have questions or concerns about your problem ;   Status:Complete;   Done: 96PZC9609   6  (1) CBC/PLT/DIFF; Status:Active; Requested for:11Oct2017;    7  (1) COMPREHENSIVE METABOLIC PANEL; Status:Active; Requested for:11Oct2017;    8  (1) C-REACTIVE PROTEIN; Status:Active; Requested for:11Oct2017;    9  (1) SED RATE; Status:Active; Requested for:11Oct2017;     10  Enbrel SureClick 50 MG/ML Subcutaneous Solution Auto-injector; INJECT 50    MG Weekly    11  Duexis 800-26 6 MG Oral Tablet; TAKE 1 TABLET Daily PRN pain    Discussion/Summary    This is a 68-year-old gentleman presenting today for follow-up for ankylosing spondylitis  The patient states that he continues to have low back pain and does describe tightness in his hips  He has noticed improvement of his left hip since surgery however  He also states that his shoulders have improved as well  He does describe a burning sensation in the neck which radiates into the shoulders  He does have stiffness that seems to get worse at the end of the day  He denies any obvious joint swelling and denies difficulty with sleep  He also describes some fatigue but denies nonrestorative sleep   He continues to utilize Enbrel weekly as well as Duexis once a day for pain with relief  On physical examination, there is no active synovitis  Patient does have tenderness of the cervical and lumbar spine with restricted range of motion of lumbar spine  There is crepitus of the knees  Patient has normal passive range of motion of the upper and lower extremities  Patient's most recent labs reveal an elevated CRP of 21 3  Patient CBC, CMP, and ESR were essentially within normal range  Patient's x-rays of the lumbar spine revealed mild squaring of the lumbar vertebral a with enthesopathic changes in the facet joints at the level of the costotransverse compatible with a history of ankylosing spondylitis  Irregularity with sclerosis at the level of the sacroiliac joint suggest sacroiliitis as well as a seronegative spondyloarthropathy  These are new since the previous radiographs from May 28, 2008  X-rays of the SI joints show erosive changes noted at both sacroiliac joint with some surrounding sclerosis suggesting sacroiliitis and seronegative spondyloarthropathy  Moderate narrowing of the left hip joint  At this time patient's history and physical examination is most consistent with ankylosing spondylitis which appears to be active at this time despite Enbrel  Patient was provided with information regarding Cosentyx of the last appointment however he is uncertain whether he would like to proceed  He did request more information about Cosentyx  In addition patient was provided with information regarding methotrexate as an option to add to his Enbrel regimen  He will contact the office if he decides to switch treatment at this time however it was highly suggested that we change treatment to lower his inflammation and prevent any further irreversible changes in his spine  Patient will return to the office in 3 months time and we will obtain a CBC, CMP, CRP, and ESR before that follow-up  He will contact the office in the interim if he has any further questions or concerns  Counseling  Rheumatology Counseling Documentation: The patient was counseled regarding diagnostic results, instructions for management, prognosis, patient and family education, risks and benefits of treatment options and importance of compliance with treatment  The following educational handouts were provided: Cosentyx / Methotrexate  Chief Complaint  F/U AS   Patient is here today for follow up of chronic conditions described in HPI  History of Present Illness  Patient is in the office today for follow up for AS  Still with low back and tightness in the hips  Shoulder is better at this time  burning in the neck into the shoulders  +Stiffness worse at the end of the day  No obvious joint swelling  No difficulty with sleep  No non restorative sleep  +Some fatigue  Taking Duexis with relief and using Enbrel  RAPID3: 7 2 /30      Review of Systems    Constitutional: fatigue, but no fever, no recent weight gain, no chills, no recent weight loss and no anorexia  HEENT: no blurred vision, no double vision, no amaurosis fugax, no dryness of the eyes, no eye pain, no erythema eye(s), no dryness mouth, no mouth sores, not feeling congested and no sore throat  Cardiovascular: no dyspnea on exertion and no swelling in the arms or legs    The patient presents with complaints of mild right lateral chest pain or pressure  Respiratory: pleurisy, but no unusual or persistent cough and no shortness of breath  Gastrointestinal: no abdominal pain, no nausea, no vomiting, no heartburn, no diarrhea, no constipation, no melena and no BRBPR  Genitourinary: No foamy urine, but no dysuria and no hematuria  Musculoskeletal: as noted in HPI  Integumentary no rash, no Raynaud's, no alopecia, no nail changes and no photosensitivity  Endocrine no polyuria and no polydipsia  Hematologic/Lymphatic: no unusual bleeding and no tendency for easy bruising     Neurological: weakness, but no headache, no vertigo or dizziness and no tingling  Psychiatric: insomnia and non-restorative sleep, but no depression and no anxiety  Active Problems    1  Arthritis (716 90) (M19 90)   2  AS (ankylosing spondylitis) (720 0) (M45 9)   3  Bilateral kidney stones (592 0) (N20 0)   4  Malaise and fatigue (780 79) (R53 81,R53 83)   5  NSAID long-term use (V58 64) (Z79 1)   6  On etanercept therapy (V58 69) (Z79 899)   7  Primary localized osteoarthritis of left hip (715 15) (M16 12)   8  Tear of left acetabular labrum, subsequent encounter (V58 89,843 8) (W75 331J)    Past Medical History    The active problems and past medical history were reviewed and updated today  Surgical History    1  History of Hip Surgery    The surgical history was reviewed and updated today  Family History  Family History    1  Denied: Family history of ankylosing spondylitis   2  Denied: Family history of Crohn's disease   3  Denied: Family history of psoriasis   4  Denied: Family history of rheumatoid arthritis   5  Denied: Family history of systemic lupus erythematosus   6  Denied: Family history of ulcerative colitis    The family history was reviewed and updated today  Social History    · Denied: History of Alcohol use   · Caffeine use (V49 89) (F15 90)   · Current every day smoker (305 1) (F17 200)   · Denied: History of Drug use   · Employed  The social history was reviewed and updated today  The social history was reviewed and is unchanged  Current Meds   1  Advil 200 MG Oral Tablet; take 2 tablet twice daily; Therapy: (Recorded:07Lvi5226) to Recorded   2  B Complex CAPS; Therapy: (Recorded:15Aug2017) to Recorded   3  Duexis 800-26 6 MG Oral Tablet; TAKE 1 TABLET Daily PRN pain; Therapy: (Recorded:11Oct2017) to Recorded   4  Enbrel SureClick 50 MG/ML Subcutaneous Solution Auto-injector; INJECT 50 MG Weekly; Therapy: (Recorded:11Oct2017) to Recorded   5  ARIANNA-e TABS; Therapy: (Recorded:97Zrv4704) to Recorded   6   TraMADol HCl - 50 MG Oral Tablet; Therapy: (Recorded:82Sjq2524) to Recorded   7  Vitamin C TABS; Therapy: (Recorded:84Aji9985) to Recorded    The medication list was reviewed and updated today  Allergies    1  No Known Drug Allergies    Vitals  Signs   Recorded: 44VCN5675 09:52AM   Heart Rate: 84  Systolic: 727  Diastolic: 80  Height: 6 ft   Weight: 144 lb   BMI Calculated: 19 53  BSA Calculated: 1 85    Physical Exam    Constitutional   General appearance: No acute distress, well appearing and well nourished  Eyes   Conjunctiva and lids: No swelling, erythema or discharge  Pupils and irises: Equal, round and reactive to light  Ears, Nose, Mouth, and Throat   External inspection of ears and nose: Normal     Oropharynx: Normal with no erythema, edema, exudate lesions, or ulcers  Pulmonary   Respiratory effort: No increased work of breathing or signs of respiratory distress  Auscultation of lungs: Clear to auscultation  Cardiovascular   Auscultation of heart: Normal rate and rhythm, normal S1 and S2, without murmurs  Examination of extremities for edema and/or varicosities: Normal     Lymphatic   Palpation of lymph nodes in neck: No lymphadenopathy  Psychiatric   Orientation to person, place, and time: Normal     Mood and affect: Normal         Musculoskeletal - Joints, bones, and muscles: Abnormal  Palpation - mid-thoracic, right thoracic, lower mid-lumbar, right lower lumbar and left lower lumbar tenderness and bilateral knee crepitus  Additional Findings - Restricted ROM of the lumbar spine with flexion, extension, and lateral bend       Right hand: All MCP, PIP and DIP joints are normal  Left Hand: All MCP, PIP and DIP joints are normal    Right foot: All MTP, PIP and DIP joints are normal  Left foot: All MTP, PIP and DIP joints are normal       Results/Data  (1) CBC/PLT/DIFF [id number] 12:52PM Shreya Aburto    Order Number: [id number]     Test Name Result Flag Reference   WBC COUNT 9 05 Thousand/uL  4 31-10 16   RBC COUNT 5 76 Million/uL H 3 88-5 62   HEMOGLOBIN 14 5 g/dL  12 0-17 0   HEMATOCRIT 45 7 %  36 5-49 3   MCV 79 fL L 82-98   MCH 25 2 pg L 26 8-34 3   MCHC 31 7 g/dL  31 4-37 4   RDW 15 1 %  11 6-15 1   MPV 8 7 fL L 8 9-12 7   PLATELET COUNT 603 Thousands/uL  149-390   NEUTROPHILS RELATIVE PERCENT 66 %  43-75   LYMPHOCYTES RELATIVE PERCENT 23 %  14-44   MONOCYTES RELATIVE PERCENT 8 %  4-12   EOSINOPHILS RELATIVE PERCENT 3 %  0-6   BASOPHILS RELATIVE PERCENT 0 %  0-1   NEUTROPHILS ABSOLUTE COUNT 5 99 Thousands/? ??L  1 85-7 62   LYMPHOCYTES ABSOLUTE COUNT 2 09 Thousands/? ??L  0 60-4 47   MONOCYTES ABSOLUTE COUNT 0 69 Thousand/? ??L  0 17-1 22   EOSINOPHILS ABSOLUTE COUNT 0 26 Thousand/? ??L  0 00-0 61   BASOPHILS ABSOLUTE COUNT 0 02 Thousands/? ??L  0 00-0 10     (1) COMPREHENSIVE METABOLIC PANEL [id number] [facility] Order Number: [id number]     Test Name Result Flag Reference   GLUCOSE,RANDM 99 mg/dL     If the patient is fasting, the ADA then defines impaired fasting glucose as > 100 mg/dL and diabetes as > or equal to 123 mg/dL  Specimen collection should occur prior to Sulfasalazine administration due to the potential for falsely depressed results  Specimen collection should occur prior to Sulfapyridine administration due to the potential for falsely elevated results  SODIUM 138 mmol/L  136-145   POTASSIUM 4 1 mmol/L  3 5-5 3   CHLORIDE 101 mmol/L  100-108   CARBON DIOXIDE 32 mmol/L  21-32   ANION GAP (CALC) 5 mmol/L  4-13   BLOOD UREA NITROGEN 13 mg/dL  5-25   CREATININE 0 91 mg/dL  0 60-1 30   Standardized to IDMS reference method   CALCIUM 9 7 mg/dL  8 3-10 1   BILI, TOTAL 0 30 mg/dL  0 20-1 00   ALK PHOSPHATAS 88 U/L     ALT (SGPT) 27 U/L  12-78   Specimen collection should occur prior to Sulfasalazine administration due to the potential for falsely depressed results     AST(SGOT) 16 U/L  5-45   Specimen collection should occur prior to Sulfasalazine administration due to the potential for falsely depressed results  ALBUMIN 3 7 g/dL  3 5-5 0   TOTAL PROTEIN 7 9 g/dL  6 4-8 2   eGFR 113 ml/min/1 73sq librado     [facility] Disease Education Program recommendations are as follows:  GFR calculation is accurate only with a steady state creatinine  Chronic Kidney disease less than 60 ml/min/1 73 sq  meters  Kidney failure less than 15 ml/min/1 73 sq  meters  (1) C-REACTIVE PROTEIN [id number] 12:52PM Burak Petit   TW Order Number: [id number]     Test Name Result Flag Reference   C-REACT PROTEIN 21 3 mg/L H <3 0     (1) SED RATE [id number] 12:52PM Burak Petit   TW Order Number: [id number]     Test Name Result Flag Reference   SED RATE 12 mm/hour H 0-10     * XR SPINE LUMBAR 2 OR 3 VIEWS INJURY 03Izc0363 11:33AM Burak Petit   TW Order Number: [id number]     Test Name Result Flag Reference   XR SPINE LUMBAR 2 OR 3 VIEWS (Report)     LUMBAR SPINE     INDICATION: Ankylosing spondylitis with back pain     COMPARISON: None     VIEWS: AP and lateral     IMAGES: 3     FINDINGS:     Alignment is unremarkable  There is no radiographic evidence of acute fracture or destructive osseous lesion  There is suggestion of mild squaring of the vertebrae with the enthesopathic in the facet joint at the level of the costotransverse joint   Visualized soft tissues appear unremarkable     Irregularity with sclerosis noted at the level of the sacroiliac joint       IMPRESSION:   Mild squaring of the lumbar vertebrae with the enthesopathic changes in the facet joint and at the level of the costotransverse compatible with history of ankylosing spondylitis     Irregularity with sclerosis at the level of the sacroiliac joint suggest sacroiliitis and seronegative spondyloarthropathy   These are new since the previous radiograph of May 28, 2008       Workstation performed: [id number]     Signed by:   Aurelio Olivas MD   8/23/17     XR SACROILIAC JOINTS 3+ VIEWS 25Clh5793 11:33AM Vivian Lopez   TW Order Number: [id number]   Performing Comments: History of AS, please evaluate     Test Name Result Flag Reference   XR SACROILIAC JOINTS 3+ VIEWS (Report)     SACROILIAC JOINTS     INDICATION: SI joint pain  COMPARISON: None     VIEWS: 2     IMAGES: 3     FINDINGS:     There are erosive changes noted in the both sacroiliac joint  There is mild sclerosis around the sacroiliac joint     Sacral arcuate lines appear intact  No fracture or pathologic bone lesions seen  Included portions of the pelvis and lumbar spine are unremarkable  The suggestion of mild calcification of the anterior longitudinal ligament     IMPRESSION:   Erosive changes noted at the both sacroiliac joint with some surrounding sclerosis suggesting sacroiliitis and seronegative spondyloarthropathy   Moderate narrowing of the left hip joint       Workstation performed: [id number]     Signed by:   Juan J Lamar MD   8/23/17       Future Appointments    Date/Time Provider Specialty Site   01/10/2018 10:00 AM Vivian Lopez, [facility] [address] [facility]     Signatures   Electronically signed by : Suly Moreno [facility]; Oct 11 2017 10:19AM EST                       (Author)    Electronically signed by :  KAELA Nieves ; Oct 25 2017  7:57AM EST                       (Author)

## 2018-01-18 NOTE — PROGRESS NOTES
Assessment    1  AS (ankylosing spondylitis) (720 0) (M45 9)   2  NSAID long-term use (V58 64) (Z79 1)   3  On etanercept therapy (V58 69) (Z79 899)   4  Malaise and fatigue (780 79) (R53 81,R53 83)   5  Bilateral kidney stones (592 0) (N20 0)   6  Tear of left acetabular labrum, subsequent encounter (V58 89,843 8) (S73 192D)   7  History of Hip Surgery    Plan  AS (ankylosing spondylitis)    · (1) CBC/PLT/DIFF; Status:Active; Requested KXI:43KWD8025;    · (1) CHRONIC HEPATITIS PANEL; Status:Active; Requested for:96Awp8708;    · (1) COMPREHENSIVE METABOLIC PANEL; Status:Active; Requested for:02Oct2017;    · (1) C-REACTIVE PROTEIN; Status:Active; Requested UZW:15UVN6202;    · (1) HIV AG/AB COMBO, 4TH GEN; [Do Not Release]; Status:Active; Requested  for:98Anu2949;    · (1) QUANTIFERON - TB GOLD; Status:Active; Requested for:81Lqq8083;    · (1) SED RATE; Status:Active; Requested for:02Oct2017;    · (1) TSH; Status:Active; Requested for:89Bev9886;    · (1) VITAMIN D 25-HYDROXY; Status:Active; Requested for:17Ttu0234;    · * XR SPINE LUMBAR 2 OR 3 VIEWS INJURY; Status:Active; Requested for:56Ori7234;    · XR SACROILIAC JOINTS 3+ VIEWS; Status:Active;  Requested for:01Qtn6180;    · Follow-up visit in 2 months Evaluation and Treatment  Follow-up  Status: Hold For -  Scheduling  Requested for: 15DKP2211   · Follow-up visit in 2 months Evaluation and Treatment  Follow-up  Status: Hold For -  Scheduling  Requested for: 66TOX5918   · Call [phone] if: The pain seems worse ; Status:Complete;   Done: 12TIP5630   · Call [phone] if: The symptoms seem worse ; Status:Complete;   Done:  88POZ1972   · Call [phone] if: You have questions or concerns about your problem ;  Status:Complete;   Done: 64TGJ9312  SocHx: Employed    · Enbrel SureClick 50 MG/ML Subcutaneous Solution Auto-injector; INJECT 50  MG Weekly    Discussion/Summary    This is a 47-year-old  gentleman presenting today for initial evaluation with a history of ankylosing spondylitis  The patient states that he was initially diagnosed with ankylosing spondylitis in 2006 by a [facility] physician  He states that he did not have any treatment with that physician however was recommended to begin Enbrel at that time  Patient then had follow up with a rheumatologist at [place] [address]  and has been seeing [place] [address]  since 2009  He states that he's been utilizing Enbrel since 2009 on a weekly basis  He states he also was placed on Celebrex, tramadol, and Flexeril however these medications do resulted in side effects therefore he does not utilize them often  He also was tried on Celebrex and other NSAIDs in the past as well  He states that his cousin is a radiologist at [facility] and was recommended to follow-up with Dr Cora Toledo for left hip pain  The patient states he did undergo surgery with Dr Cora Toledo for a labral tear in early June  He states that he does continue to have some left hip discomfort and states that initially when he began having joint pain it was in the posterior aspect of his left thigh  He also developed stiffness in his muscles that he attributed to riding [place] for many years  He states that at this time in addition to left hip discomfort he does have right hip discomfort as well as low back pain  He also describes shoulder pain  He is currently completing physical therapy at this time  He states that he has no obvious joint swelling but does describe stiffness primarily after activity  He does not note any morning stiffness  He states he does have some difficulty with sleep as well as fatigue but denies nonrestorative sleep  In addition to Enbrel, he is utilizing Advil  He states that when his most recent blood work was taken he was off of Enbrel for his surgery for several months  He has been on Enbrel for the last 2 weeks since surgery  On physical examination, there is no active synovitis   Patient does have restricted range of motion of the shoulders bilaterally primary on the left side  In addition he had does have tenderness of the thoracic and lumbar spine with restricted range of motion with flexion, extension, and lateral bend  He does have restricted range of motion of the hips with some tenderness of the left hip  There is crepitus of the knees  Patient has 5 out of 5 strength in the upper and lower extremities  Patient's most recent labs reveal a positive HLA-B27 however patient's NATALIYA, rheumatoid factor and CCP were negative  Patient did have an elevated CRP of 57 4 as well as a sedimentation rate which is within normal range at 20  Patient's most recent CBC and BMP were within normal range  At this time patient's history and physical examination is most consistent with ankylosing spondylitis which appears to be active at this time  Patient states that he has noticed significant relief with Enbrel in the past therefore would like to remain on Enbrel consistently for the next 2 months until he returns back to the office  He states that he is in more pain as he has been off of Enbrel since his surgery  Patient was sent for updated labs including a hepatitis panel, HIV, QuantiFERON TB Gold, vitamin D level, and TSH  Patient was also sent for x-ray the lumbar spine as well as the SI joints  In addition patient was provided with information regarding Cosentyx as an option for his ankylosing spondylitis if his inflammation has not improved and he continues to have symptoms at the next appointment  He was provided with samples of Duexis and was asked not to take any further over-the-counter anti-inflammatories with this  Patient will return to the office in 2 months time however contact the office in the interim if he has any further questions or concerns        Counseling  Rheumatology Counseling Documentation: The patient was counseled regarding diagnostic results, instructions for management, prognosis, patient and family education, risks and benefits of treatment options and importance of compliance with treatment  The following educational handouts were provided: Cosentyx  Chief Complaint  History of AS      History of Present Illness  This is a 36year old  male presenting for initial evaluation for a history of AS  Seeing [facility] rheumatology since 2009  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  Dx in 2006 at [facility] no treatment  [facility] placed him on Celebrex, Tramadol and Flexeril and this was stopped due to side effects  Started Enbrel in 2009 and still on it  Started with left posterior thigh pain and stiffness in the muscles and thought it was from riding [employer]  Was off Enbrel for one month and then blood work  Back on Enbrel x 2 weeks  Saw  Dr Steven Baez and had surgery for labral tear  Some left hip stiffness  No Am stiffness  More stiffness with activity  Also with right hip and shoulder pain and low back pain  Completing PT at this time  No obvious joint swelling  +Some difficulty with sleep  no non restorative sleep  +fatigue  Taking Advil with relief  Review of Systems    Constitutional: fatigue, but no fever, no recent weight gain, no chills, no recent weight loss and no anorexia  HEENT: no blurred vision, no double vision, no amaurosis fugax, no dryness of the eyes, no eye pain, no erythema eye(s), no dryness mouth, no mouth sores, not feeling congested and no sore throat  Cardiovascular: no dyspnea on exertion and no swelling in the arms or legs    The patient presents with complaints of mild right lateral chest pain or pressure  Respiratory: pleurisy, but no unusual or persistent cough and no shortness of breath  Gastrointestinal: no abdominal pain, no nausea, no vomiting, no heartburn, no diarrhea, no constipation, no melena and no BRBPR  Genitourinary: No foamy urine, but no dysuria and no hematuria  Musculoskeletal: as noted in HPI     Integumentary no rash, no Raynaud's, no alopecia, no nail changes and no photosensitivity  Endocrine no polyuria and no polydipsia  Hematologic/Lymphatic: no unusual bleeding and no tendency for easy bruising  Neurological: weakness, but no headache, no vertigo or dizziness and no tingling  Psychiatric: insomnia and non-restorative sleep, but no depression and no anxiety  Active Problems    1  Arthritis (716 90) (M19 90)   2  AS (ankylosing spondylitis) (720 0) (M45 9)   3  Primary localized osteoarthritis of left hip (715 15) (M16 12)   4  Tear of left acetabular labrum, subsequent encounter (V58 89,843 8) (V05 963B)    Surgical History    1  History of Hip Surgery    Family History  Family History    1  Denied: Family history of ankylosing spondylitis   2  Denied: Family history of Crohn's disease   3  Denied: Family history of psoriasis   4  Denied: Family history of rheumatoid arthritis   5  Denied: Family history of systemic lupus erythematosus   6  Denied: Family history of ulcerative colitis    Social History    · Denied: History of Alcohol use   · Caffeine use (V49 89) (F15 90)   · Current every day smoker (305 1) (F17 200)   · Denied: History of Drug use   · Employed    Current Meds   1  Advil 200 MG Oral Tablet; take 2 tablet twice daily; Therapy: (Recorded:44Bbi5267) to Recorded   2  B Complex CAPS; Therapy: (Recorded:62Egq6156) to Recorded   3  Enbrel SureClick 50 MG/ML Subcutaneous Solution Auto-injector; INJECT 50 MG Weekly; Therapy: (Recorded:15Aug2017) to Recorded   4  ARIANNA-e TABS; Therapy: (Recorded:65Wqe8431) to Recorded   5  TraMADol HCl - 50 MG Oral Tablet; Therapy: (Recorded:57Fhg6614) to Recorded   6  Vitamin C TABS; Therapy: (Recorded:32Gss2043) to Recorded    Allergies    1   No Known Drug Allergies    Vitals  Signs   Recorded: 15Aug2017 11:05AM   Heart Rate: 88  Systolic: 532  Diastolic: 80  Height: 6 ft   Weight: 145 lb   BMI Calculated: 19 67  BSA Calculated: 1 86    Physical Exam    Constitutional   General appearance: No acute distress, well appearing and well nourished  Eyes   Conjunctiva and lids: No swelling, erythema or discharge  Pupils and irises: Equal, round and reactive to light  Ears, Nose, Mouth, and Throat   External inspection of ears and nose: Normal     Oropharynx: Normal with no erythema, edema, exudate lesions, or ulcers  Pulmonary   Respiratory effort: No increased work of breathing or signs of respiratory distress  Auscultation of lungs: Clear to auscultation  Cardiovascular   Auscultation of heart: Normal rate and rhythm, normal S1 and S2, without murmurs  Examination of extremities for edema and/or varicosities: Normal     Lymphatic   Palpation of lymph nodes in neck: No lymphadenopathy  Psychiatric   Orientation to person, place, and time: Normal     Mood and affect: Normal         Right glenohumeral joint restricted ROM  Left glenohumeral joint restricted ROM  Right hip restricted ROM  Left hip tenderness and restricted ROM  Musculoskeletal - Joints, bones, and muscles: Abnormal  Palpation - mid-thoracic, right thoracic, lower mid-lumbar, right lower lumbar and left lower lumbar tenderness and bilateral knee crepitus  Muscle strength/tone: Normal    Right upper extremity: shoulder abduction 5/5, biceps 5/5, triceps 5/5, but normal hand   Left upper extremity shoulder abduction 5/5, biceps 5/5, triceps 5/5, but normal hand   Right lower extremity strength: hip flexion 5/5, hip internal rotation 5/5, hip external rotation 5/5, knee flexion 5/5, knee extension 5/5, ankle dorsiflexion 5/5, ankle plantar flexion 5/5  Left lower extremity strength: hip flexion 5/5, hip internal rotation 5/5, hip external rotation 5/5, knee flexion 5/5, knee extension 5/5, ankle dorsiflexion 5/5, ankle plantar flexion 5/5  Additional Findings - Restricted ROM of the lumbar spine with flexion, extension, and lateral bend  Right hand: All MCP, PIP and DIP joints are normal  Left Hand:  All MCP, PIP and DIP joints are normal    Right foot: All MTP, PIP and DIP joints are normal  Left foot: All MTP, PIP and DIP joints are normal       Results/Data  (1) SED RATE 21Jul2017 11:43AM Hannah Bowman    Order Number: [id number]     Test Name Result Flag Reference   SED RATE 20 mm/hour H 0-10     (1) C-REACTIVE PROTEIN 21Jul2017 11:43AM Destinee Jean Order Number: [id number]     Test Name Result Flag Reference   C-REACT PROTEIN 57 4 mg/L H <3 0     (1) NATALIYA SCREEN W/REFLEX TO TITER/PATTERN [id number] 11:43AM Destinee Jean Order Number: [id number]     Test Name Result Flag Reference   NATALIYA SCREEN  Negative  Negative     (1) CBC/PLT/DIFF 21Jul2017 11:43AM Destinee Jean Order Number: [id number]     Test Name Result Flag Reference   WBC COUNT 8 92 Thousand/uL  4 31-10 16   RBC COUNT 5 68 Million/uL H 3 88-5 62   HEMOGLOBIN 14 2 g/dL  12 0-17 0   HEMATOCRIT 44 5 %  36 5-49 3   MCV 78 fL L 82-98   MCH 25 0 pg L 26 8-34 3   MCHC 31 9 g/dL  31 4-37 4   RDW 13 8 %  11 6-15 1   MPV 8 5 fL L 8 9-12 7   PLATELET COUNT 162 Thousands/uL  149-390   NEUTROPHILS RELATIVE PERCENT 70 %  43-75   LYMPHOCYTES RELATIVE PERCENT 17 %  14-44   MONOCYTES RELATIVE PERCENT 11 %  4-12   EOSINOPHILS RELATIVE PERCENT 2 %  0-6   BASOPHILS RELATIVE PERCENT 0 %  0-1   NEUTROPHILS ABSOLUTE COUNT 6 25 Thousands/? ??L  1 85-7 62   LYMPHOCYTES ABSOLUTE COUNT 1 49 Thousands/? ??L  0 60-4 47   MONOCYTES ABSOLUTE COUNT 1 00 Thousand/? ??L  0 17-1 22   EOSINOPHILS ABSOLUTE COUNT 0 16 Thousand/? ??L  0 00-0 61   BASOPHILS ABSOLUTE COUNT 0 02 Thousands/? ??L  0 00-0 10     (1) RHEUMATOID FACTOR SCREEN 21Jul2017 11:43AM Destinee Jean Order Number: [id number]     Test Name Result Flag Reference   RHEUMATOID FACTOR Negative  Negative     (1) CYCLIC CITRULLINATED PEPTIDE 21Jul2017 11:43AM Destinee Jean Order Number: [id number]     Test Name Result Flag Reference   CYCLIC CITRULLINATED PEPTIDE 18 units  0 - 19   Negative               <20                            Weak positive      20 - 39                            Moderate positive  40 - 59                            Strong positive        >59    Performed at:  [address]  [id number]  : Danitza Oquendo MD, Phone:  [phone]     (1) HLA B27 87OUZ2814 11:43AM Destinee Jean Order Number: [id number]     Test Name Result Flag Reference   HLA B27 Positive     HLA-B*27 Positive  This patient is positive for HLA-B*27  This procedure rules  out the B*27:06 and 27:09 alleles, which the literature  suggests are not associated with spondyloarthropathies  HLA allele interpretation for all loci based on IMGT/HLA  database version 3 25 0  This test was developed and its performance characteristics  determined by [facility]   It has not been cleared or approved  by the Food and Drug Administration  [facility] CLIA ID Number [id number]  This test was performed using PCR (Polymerase Chain Reaction)/SSOP  (Sequence Specific Oligonucleotide Probes) technique  SBT (Sequence  Based Typing) and/or SSP (Sequence Specific Primers) may be used as  supplemental methods when necessary  Please contact HLA Customer  Service at [phone] if you have any questions  Director of [facility]   Dr Kris Willis, PhD    Performed at:  [address]  : Jessica Castillo PhD, Phone:  [phone]       Attending Note  Collaborating Physician: I did not interview and examine the patient, I discussed the case with the Advanced Practitioner and reviewed the note and I agree with the Advanced Practitioner note  Future Appointments    Date/Time Provider Specialty Site   09/01/2017 10:00 AM KAELA Meza   Orthopedic Surgery [facility] [address]   10/11/2017 10:00 AM Mitch Callahan [facility] Rheumatology [address] [facility]     Signatures   Electronically signed by : Luis Newell, [facility];  Aug 15 2017 12:19PM EST                       (Author)    Electronically signed by : Husam Good DO; Aug 15 2017  5:00PM EST                       (Author)

## 2018-01-22 VITALS
BODY MASS INDEX: 19.5 KG/M2 | DIASTOLIC BLOOD PRESSURE: 80 MMHG | WEIGHT: 144 LBS | HEIGHT: 72 IN | HEART RATE: 84 BPM | SYSTOLIC BLOOD PRESSURE: 126 MMHG

## 2018-01-22 VITALS
DIASTOLIC BLOOD PRESSURE: 80 MMHG | BODY MASS INDEX: 19.64 KG/M2 | WEIGHT: 145 LBS | HEART RATE: 88 BPM | SYSTOLIC BLOOD PRESSURE: 130 MMHG | HEIGHT: 72 IN

## 2018-01-23 VITALS
SYSTOLIC BLOOD PRESSURE: 112 MMHG | HEART RATE: 80 BPM | BODY MASS INDEX: 19.91 KG/M2 | WEIGHT: 147 LBS | DIASTOLIC BLOOD PRESSURE: 76 MMHG | HEIGHT: 72 IN

## 2018-01-23 NOTE — PROGRESS NOTES
Assessment    1  AS (ankylosing spondylitis) (720 0) (M45 9)   2  NSAID long-term use (V58 64) (Z79 1)   3  On etanercept therapy (V58 69) (Z79 899)   4  Malaise and fatigue (780 79) (R53 81,R53 83)   5  Bilateral kidney stones (592 0) (N20 0)    Plan    1  Call [phone] if: The pain seems worse ; Status:Complete;   Done: [id number]   2  Call [phone] if: The symptoms seem worse ; Status:Complete;   Done:   [id number]   3  Call [phone] if: You have questions or concerns about your problem ;   Status:Complete;   Done: [id number]   4  Follow-up visit in 6 months Evaluation and Treatment  Follow-up  Status: Complete    Done: [id number]    5  Enbrel SureClick 50 MG/ML Subcutaneous Solution Auto-injector; INJECT 50 MG   Weekly    Discussion/Summary    This is a 70-year-old gentleman presenting today for follow-up for ankylosing spondylitis  The patient states he continues to have significant stiffness in the low back and hips  He describes tightness in the hips and states that his left leg constantly feels achy  He denies any obvious joint swelling and does utilize Duexis for this with relief  He states he does feel most stiff toward the end of the day and denies any morning stiffness  He describes non restorative sleep as well as fatigue but denies any difficulty with sleep  On physical examination, patient has no active synovitis  He does have significant restricted range of motion of the lumbar spine with tenderness of the lumbar spine  He also has restricted range of motion of both hips as well as tenderness of the hips  There is crepitus of the knees  At this time patient's history and physical examination is most consistent with ankylosing spondylitis which appears to be active at this time despite Enbrel  Upon further questioning patient states that he is not always compliant with his Enbrel dose weekly  Patient was recommended to try to remain compliant with Enbrel and does this every week   In addition further options such as the addition of methotrexate was discussed with the patient or possibly switching biologic to Cosentyx  Patient states that he would not like to make any further changes at this time  He states that he will obtain a CBC, CMP, CRP, and ESR  He will contact the office in the interim if he has any further questions or concerns but plan to return to the office in 6 months time for further evaluation  I did discuss with the patient that I do believe we could improve his symptoms as well as lower his inflammatory markers with further treatment however patient states that he would like to continue with his current medication regimen  The patient has the current Goals: Improve joint pain and inflammatory markers  The patent has the current Barriers: Patient would not like to proceed with further treatment  Patient is able to Self-Care  Counseling  Rheumatology Counseling Documentation: The patient was counseled regarding instructions for management, prognosis, patient and family education, risks and benefits of treatment options and importance of compliance with treatment  Chief Complaint  F/U AS   Patient is here today for follow up of chronic conditions described in HPI  History of Present Illness  Patient is in the office today for follow up for AS  Still stiffness in the low back and hips  Tightness in the hips and achy in the left leg  No obvious joint swelling  Duexis helps  No AM stiffness  Stiffness with activity  No difficulty with sleep  +Non restorative sleep  +fatigue  RAPID3: 9 3 /30      Review of Systems    Constitutional: fatigue, but no fever, no recent weight gain, no chills, no recent weight loss and no anorexia  HEENT: no blurred vision, no double vision, no amaurosis fugax, no dryness of the eyes, no eye pain, no erythema eye(s), no dryness mouth, no mouth sores, not feeling congested and no sore throat     Cardiovascular: no dyspnea on exertion and no swelling in the arms or legs    The patient presents with complaints of mild right lateral chest pain or pressure  Respiratory: pleurisy, but no unusual or persistent cough and no shortness of breath  Gastrointestinal: no abdominal pain, no nausea, no vomiting, no heartburn, no diarrhea, no constipation, no melena and no BRBPR  Genitourinary: No foamy urine, but no dysuria and no hematuria  Musculoskeletal: as noted in HPI  Integumentary no rash, no Raynaud's, no alopecia, no nail changes and no photosensitivity  Endocrine no polyuria and no polydipsia  Hematologic/Lymphatic: no unusual bleeding and no tendency for easy bruising  Neurological: weakness, but no headache, no vertigo or dizziness and no tingling  Psychiatric: insomnia and non-restorative sleep, but no depression and no anxiety  Active Problems    1  Arthritis (716 90) (M19 90)   2  AS (ankylosing spondylitis) (720 0) (M45 9)   3  Bilateral kidney stones (592 0) (N20 0)   4  Malaise and fatigue (780 79) (R53 81,R53 83)   5  NSAID long-term use (V58 64) (Z79 1)   6  On etanercept therapy (V58 69) (Z79 899)   7  Primary localized osteoarthritis of left hip (715 15) (M16 12)   8  Tear of left acetabular labrum, subsequent encounter (V58 89,843 8) (O45 077F)    Past Medical History    The active problems and past medical history were reviewed and updated today  Surgical History    1  History of Hip Surgery    The surgical history was reviewed and updated today  Family History  Family History    1  Denied: Family history of ankylosing spondylitis   2  Denied: Family history of Crohn's disease   3  Denied: Family history of psoriasis   4  Denied: Family history of rheumatoid arthritis   5  Denied: Family history of systemic lupus erythematosus   6  Denied: Family history of ulcerative colitis    The family history was reviewed and updated today         Social History    · Denied: History of Alcohol use   · Caffeine use (V49 89) (F15 90)   · Current every day smoker (305 1) (F17 200)   · Denied: History of Drug use   · Employed  The social history was reviewed and updated today  The social history was reviewed and is unchanged  Current Meds   1  Advil 200 MG Oral Tablet; take 2 tablet twice daily; Therapy: (Recorded:63Jfg4356) to Recorded   2  B Complex CAPS; Therapy: (Recorded:47Oys4695) to Recorded   3  Duexis 800-26 6 MG Oral Tablet; TAKE 1 TABLET Daily PRN pain; Therapy: (Recorded:11Oct2017) to Recorded   4  Enbrel SureClick 50 MG/ML Subcutaneous Solution Auto-injector; INJECT 50 MG Weekly; Therapy: (Recorded:11Oct2017) to Recorded   5  ARIANNA-e TABS; Therapy: (Recorded:67Cap4570) to Recorded   6  TraMADol HCl - 50 MG Oral Tablet; Therapy: (Recorded:72Hbq0942) to Recorded   7  Vitamin C TABS; Therapy: (Recorded:52Eie3709) to Recorded    The medication list was reviewed and updated today  Allergies    1  No Known Drug Allergies    Vitals  Signs   Recorded: 93CBL3530 09:59AM   Heart Rate: 80  Systolic: 266  Diastolic: 76  Height: 6 ft   Weight: 147 lb   BMI Calculated: 19 94  BSA Calculated: 1 87    Physical Exam    Constitutional   General appearance: No acute distress, well appearing and well nourished  Eyes   Conjunctiva and lids: No swelling, erythema or discharge  Pupils and irises: Equal, round and reactive to light  Ears, Nose, Mouth, and Throat   External inspection of ears and nose: Normal     Oropharynx: Normal with no erythema, edema, exudate lesions, or ulcers  Pulmonary   Respiratory effort: No increased work of breathing or signs of respiratory distress  Auscultation of lungs: Clear to auscultation  Cardiovascular   Auscultation of heart: Normal rate and rhythm, normal S1 and S2, without murmurs  Examination of extremities for edema and/or varicosities: Normal     Lymphatic   Palpation of lymph nodes in neck: No lymphadenopathy      Psychiatric   Orientation to person, place, and time: Normal     Mood and affect: Normal         Right hip tenderness and restricted ROM  Left hip tenderness and restricted ROM  Musculoskeletal - Joints, bones, and muscles: Abnormal  Palpation - mid-thoracic, right thoracic, lower mid-lumbar, right lower lumbar and left lower lumbar tenderness and bilateral knee crepitus  Additional Findings - Restricted ROM of the lumbar spine with flexion, extension, and lateral bend  Right hand: All MCP, PIP and DIP joints are normal  Left Hand: All MCP, PIP and DIP joints are normal    Right foot: All MTP, PIP and DIP joints are normal  Left foot: All MTP, PIP and DIP joints are normal       Signatures   Electronically signed by : JUDI Gaytan; Yovany 10 2018 10:23AM EST                       (Author)    Electronically signed by :  KAELA Patterson ; Jan 11 2018 11:07AM EST                       (Author)

## 2018-04-04 ENCOUNTER — TELEPHONE (OUTPATIENT)
Dept: OBGYN CLINIC | Facility: HOSPITAL | Age: 33
End: 2018-04-04

## 2018-04-05 DIAGNOSIS — M45.9 ANKYLOSING SPONDYLITIS, UNSPECIFIED SITE OF SPINE (HCC): Primary | ICD-10-CM

## 2018-04-05 DIAGNOSIS — M45.9 ANKYLOSING SPONDYLITIS, UNSPECIFIED SITE OF SPINE (HCC): ICD-10-CM

## 2018-04-05 NOTE — TELEPHONE ENCOUNTER
[facility], can you please have it cancelled at his other pharmacy if necessary?

## 2018-04-05 NOTE — TELEPHONE ENCOUNTER
[facility]rist[facility] Perkins [facility] to let us know that the injection has to go through a specialty pharmacy not their location

## 2018-04-05 NOTE — TELEPHONE ENCOUNTER
Checked [employer] and it looks like [facility] filled with [facility]  I added this to the chart   Please send rx there

## 2018-07-09 PROBLEM — M16.12 PRIMARY LOCALIZED OSTEOARTHRITIS OF LEFT HIP: Status: ACTIVE | Noted: 2017-02-21

## 2018-07-09 PROBLEM — M45.9 AS (ANKYLOSING SPONDYLITIS) (HCC): Status: ACTIVE | Noted: 2017-07-21

## 2018-07-09 NOTE — PROGRESS NOTES
No problem-specific Assessment & Plan notes found for this encounter  Plan:  Diagnoses and all orders for this visit:    Ankylosing spondylitis of multiple sites in spine [facility])    Primary localized osteoarthritis of left hip      Assessment: This is a 27-year-old gentleman presenting today for follow-up for ankylosing spondylitis  The patient states that he continues to have low back pain and some neck pain  He denies any further joint pain or obvious joint swelling  He states he has no morning stiffness but does notice stiffness to become worse throughout the day  He also notes that he's posture changes throughout the day  He has some non restorative sleep and does describe fatigue but denies difficulty with sleep  He currently utilizes Enbrel weekly and is currently also utilizing Duexis as needed for pain  Usually takes this once or twice a day  On physical examination, there is no active synovitis  He has tenderness of the cervical as well as the thoracic and lumbar spine  He has restricted range of motion of the lumbar spine as well as bilateral hips  He has crepitus of the knees  At this time patient's history and physical examination is most consistent with ankylosing spondylitis which appears to be active and not well controlled with Enbrel  In the past, I have discussed further options with the patient including switching biologics to Cosentyx or another option  In addition, I did also give patient the option of adding methotrexate to his Enbrel  The risk of not adequately controlling his inflammation were discussed with the patient at length  The patient would not like to make any further changes at this time  He will obtain a CBC, CMP, CRP, ESR at this time  He will contact the office in the interim if he has any further questions or concerns  This patient will need follow-up with Dr Sima Hsu on a biweekly basis            Subjective:      Patient ID: Gualberto Urias is a 29 y o   male presenting today for follow up for AS  Pain in the low back and the neck  No other joint pain or joint swelling  No AM stiffness but worse throughout the day  No difficulty with sleep  +Some non restorative sleep  +fatigue  Taking Enbrel  Taking Duexis with some relief once a day as needed  The following portions of the patient's history were reviewed and updated as appropriate: He  has a past medical history of Arthritis and Kidney stone     Review of Systems   Constitutional: Positive for fatigue  Negative for appetite change, chills, fever and unexpected weight change  HENT: Negative for congestion, mouth sores and sore throat  Eyes: Negative for pain, redness and visual disturbance  Respiratory: Negative for cough, chest tightness and shortness of breath  Cardiovascular: Negative for chest pain and leg swelling  Gastrointestinal: Negative for abdominal pain, blood in stool, constipation, diarrhea, nausea and vomiting  Endocrine: Negative for polydipsia and polyuria  Genitourinary: Negative for frequency and hematuria  Skin: Negative for color change and rash  Neurological: Positive for weakness (Legs and the back )  Negative for light-headedness, numbness and headaches  Hematological: Negative for adenopathy  Psychiatric/Behavioral: Negative for behavioral problems  The patient is not nervous/anxious  Objective:    Physical Exam   Constitutional: He is oriented to person, place, and time  He appears well-developed and well-nourished  HENT:   Head: Normocephalic  Mouth/Throat: Oropharynx is clear and moist    Eyes: Conjunctivae are normal  Pupils are equal, round, and reactive to light  Neck: Normal range of motion  Neck supple  Cardiovascular: Normal rate, regular rhythm and normal heart sounds  Pulmonary/Chest: Effort normal and breath sounds normal    Musculoskeletal:   +Restricted ROM of the lumbar spine and B/L hips    Tenderness of the cervical, thoracic, and lumbar spine  +crepitus of the knees  Neurological: He is alert and oriented to person, place, and time  Skin: Skin is warm and dry  Psychiatric: He has a normal mood and affect   His behavior is normal        Physical Exam     LYNN-28 tender joint count: 0  LYNN-28 swollen joint count: 0      Results Reviewed     None

## 2018-07-10 ENCOUNTER — TRANSCRIBE ORDERS (OUTPATIENT)
Dept: RADIOLOGY | Facility: CLINIC | Age: 33
End: 2018-07-10

## 2018-07-10 ENCOUNTER — OFFICE VISIT (OUTPATIENT)
Dept: RHEUMATOLOGY | Facility: CLINIC | Age: 33
End: 2018-07-10
Payer: COMMERCIAL

## 2018-07-10 ENCOUNTER — APPOINTMENT (OUTPATIENT)
Dept: LAB | Facility: CLINIC | Age: 33
End: 2018-07-10
Payer: COMMERCIAL

## 2018-07-10 VITALS
HEIGHT: 72 IN | HEART RATE: 80 BPM | BODY MASS INDEX: 19.64 KG/M2 | SYSTOLIC BLOOD PRESSURE: 110 MMHG | WEIGHT: 145 LBS | DIASTOLIC BLOOD PRESSURE: 64 MMHG

## 2018-07-10 DIAGNOSIS — M16.12 PRIMARY LOCALIZED OSTEOARTHRITIS OF LEFT HIP: ICD-10-CM

## 2018-07-10 DIAGNOSIS — M45.0 ANKYLOSING SPONDYLITIS OF MULTIPLE SITES IN SPINE (HCC): Primary | ICD-10-CM

## 2018-07-10 LAB
ALBUMIN SERPL BCP-MCNC: 3.5 G/DL (ref 3.5–5)
ALP SERPL-CCNC: 75 U/L (ref 46–116)
ALT SERPL W P-5'-P-CCNC: 22 U/L (ref 12–78)
ANION GAP SERPL CALCULATED.3IONS-SCNC: 6 MMOL/L (ref 4–13)
AST SERPL W P-5'-P-CCNC: 14 U/L (ref 5–45)
BASOPHILS # BLD AUTO: 0.03 THOUSANDS/ΜL (ref 0–0.1)
BASOPHILS NFR BLD AUTO: 0 % (ref 0–1)
BILIRUB SERPL-MCNC: 0.4 MG/DL (ref 0.2–1)
BUN SERPL-MCNC: 15 MG/DL (ref 5–25)
CALCIUM SERPL-MCNC: 8.8 MG/DL (ref 8.3–10.1)
CHLORIDE SERPL-SCNC: 104 MMOL/L (ref 100–108)
CO2 SERPL-SCNC: 30 MMOL/L (ref 21–32)
CREAT SERPL-MCNC: 1.01 MG/DL (ref 0.6–1.3)
CRP SERPL QL: 27.8 MG/L
EOSINOPHIL # BLD AUTO: 0.35 THOUSAND/ΜL (ref 0–0.61)
EOSINOPHIL NFR BLD AUTO: 5 % (ref 0–6)
ERYTHROCYTE [DISTWIDTH] IN BLOOD BY AUTOMATED COUNT: 14.2 % (ref 11.6–15.1)
ERYTHROCYTE [SEDIMENTATION RATE] IN BLOOD: 11 MM/HOUR (ref 0–10)
GFR SERPL CREATININE-BSD FRML MDRD: 99 ML/MIN/1.73SQ M
GLUCOSE SERPL-MCNC: 88 MG/DL (ref 65–140)
HCT VFR BLD AUTO: 40.9 % (ref 36.5–49.3)
HGB BLD-MCNC: 13.8 G/DL (ref 12–17)
LYMPHOCYTES # BLD AUTO: 1.68 THOUSANDS/ΜL (ref 0.6–4.47)
LYMPHOCYTES NFR BLD AUTO: 22 % (ref 14–44)
MCH RBC QN AUTO: 26 PG (ref 26.8–34.3)
MCHC RBC AUTO-ENTMCNC: 33.7 G/DL (ref 31.4–37.4)
MCV RBC AUTO: 77 FL (ref 82–98)
MONOCYTES # BLD AUTO: 0.74 THOUSAND/ΜL (ref 0.17–1.22)
MONOCYTES NFR BLD AUTO: 10 % (ref 4–12)
NEUTROPHILS # BLD AUTO: 4.89 THOUSANDS/ΜL (ref 1.85–7.62)
NEUTS SEG NFR BLD AUTO: 64 % (ref 43–75)
PLATELET # BLD AUTO: 360 THOUSANDS/UL (ref 149–390)
PMV BLD AUTO: 8.3 FL (ref 8.9–12.7)
POTASSIUM SERPL-SCNC: 4.3 MMOL/L (ref 3.5–5.3)
PROT SERPL-MCNC: 7.4 G/DL (ref 6.4–8.2)
RBC # BLD AUTO: 5.31 MILLION/UL (ref 3.88–5.62)
SODIUM SERPL-SCNC: 140 MMOL/L (ref 136–145)
WBC # BLD AUTO: 7.69 THOUSAND/UL (ref 4.31–10.16)

## 2018-07-10 PROCEDURE — 80053 COMPREHEN METABOLIC PANEL: CPT | Performed by: PHYSICIAN ASSISTANT

## 2018-07-10 PROCEDURE — 85652 RBC SED RATE AUTOMATED: CPT | Performed by: PHYSICIAN ASSISTANT

## 2018-07-10 PROCEDURE — 85025 COMPLETE CBC W/AUTO DIFF WBC: CPT | Performed by: PHYSICIAN ASSISTANT

## 2018-07-10 PROCEDURE — 36415 COLL VENOUS BLD VENIPUNCTURE: CPT | Performed by: PHYSICIAN ASSISTANT

## 2018-07-10 PROCEDURE — 86140 C-REACTIVE PROTEIN: CPT | Performed by: PHYSICIAN ASSISTANT

## 2018-07-10 PROCEDURE — 99214 OFFICE O/P EST MOD 30 MIN: CPT | Performed by: PHYSICIAN ASSISTANT

## 2018-07-10 RX ORDER — IBUPROFEN AND FAMOTIDINE 26.6; 8 MG/1; MG/1
1 TABLET, FILM COATED ORAL DAILY PRN
COMMUNITY
End: 2019-01-11 | Stop reason: SDUPTHER

## 2018-07-23 DIAGNOSIS — M45.9 ANKYLOSING SPONDYLITIS, UNSPECIFIED SITE OF SPINE (HCC): ICD-10-CM

## 2018-07-23 RX ORDER — ETANERCEPT 50 MG/ML
50 SOLUTION SUBCUTANEOUS WEEKLY
Qty: 11.76 ML | Refills: 0 | Status: SHIPPED | OUTPATIENT
Start: 2018-07-23 | End: 2018-10-30 | Stop reason: SDUPTHER

## 2018-10-12 ENCOUNTER — APPOINTMENT (OUTPATIENT)
Dept: LAB | Facility: CLINIC | Age: 33
End: 2018-10-12
Payer: COMMERCIAL

## 2018-10-12 ENCOUNTER — OFFICE VISIT (OUTPATIENT)
Dept: RHEUMATOLOGY | Facility: CLINIC | Age: 33
End: 2018-10-12
Payer: COMMERCIAL

## 2018-10-12 VITALS
DIASTOLIC BLOOD PRESSURE: 70 MMHG | HEIGHT: 72 IN | BODY MASS INDEX: 19.37 KG/M2 | WEIGHT: 143 LBS | HEART RATE: 82 BPM | SYSTOLIC BLOOD PRESSURE: 118 MMHG

## 2018-10-12 DIAGNOSIS — M45.0 ANKYLOSING SPONDYLITIS OF MULTIPLE SITES IN SPINE (HCC): Primary | ICD-10-CM

## 2018-10-12 LAB
ALBUMIN SERPL BCP-MCNC: 3.7 G/DL (ref 3.5–5)
ALP SERPL-CCNC: 78 U/L (ref 46–116)
ALT SERPL W P-5'-P-CCNC: 23 U/L (ref 12–78)
ANION GAP SERPL CALCULATED.3IONS-SCNC: 6 MMOL/L (ref 4–13)
AST SERPL W P-5'-P-CCNC: 14 U/L (ref 5–45)
BASOPHILS # BLD AUTO: 0.03 THOUSANDS/ΜL (ref 0–0.1)
BASOPHILS NFR BLD AUTO: 0 % (ref 0–1)
BILIRUB SERPL-MCNC: 0.4 MG/DL (ref 0.2–1)
BUN SERPL-MCNC: 18 MG/DL (ref 5–25)
CALCIUM SERPL-MCNC: 8.8 MG/DL (ref 8.3–10.1)
CHLORIDE SERPL-SCNC: 102 MMOL/L (ref 100–108)
CO2 SERPL-SCNC: 30 MMOL/L (ref 21–32)
CREAT SERPL-MCNC: 0.95 MG/DL (ref 0.6–1.3)
CRP SERPL QL: 22 MG/L
EOSINOPHIL # BLD AUTO: 0.36 THOUSAND/ΜL (ref 0–0.61)
EOSINOPHIL NFR BLD AUTO: 4 % (ref 0–6)
ERYTHROCYTE [DISTWIDTH] IN BLOOD BY AUTOMATED COUNT: 14 % (ref 11.6–15.1)
ERYTHROCYTE [SEDIMENTATION RATE] IN BLOOD: 10 MM/HOUR (ref 0–10)
GFR SERPL CREATININE-BSD FRML MDRD: 107 ML/MIN/1.73SQ M
GLUCOSE SERPL-MCNC: 94 MG/DL (ref 65–140)
HCT VFR BLD AUTO: 46.1 % (ref 36.5–49.3)
HGB BLD-MCNC: 14.7 G/DL (ref 12–17)
IMM GRANULOCYTES # BLD AUTO: 0.02 THOUSAND/UL (ref 0–0.2)
IMM GRANULOCYTES NFR BLD AUTO: 0 % (ref 0–2)
LYMPHOCYTES # BLD AUTO: 1.85 THOUSANDS/ΜL (ref 0.6–4.47)
LYMPHOCYTES NFR BLD AUTO: 22 % (ref 14–44)
MCH RBC QN AUTO: 25.7 PG (ref 26.8–34.3)
MCHC RBC AUTO-ENTMCNC: 31.9 G/DL (ref 31.4–37.4)
MCV RBC AUTO: 81 FL (ref 82–98)
MONOCYTES # BLD AUTO: 0.83 THOUSAND/ΜL (ref 0.17–1.22)
MONOCYTES NFR BLD AUTO: 10 % (ref 4–12)
NEUTROPHILS # BLD AUTO: 5.32 THOUSANDS/ΜL (ref 1.85–7.62)
NEUTS SEG NFR BLD AUTO: 64 % (ref 43–75)
NRBC BLD AUTO-RTO: 0 /100 WBCS
PLATELET # BLD AUTO: 331 THOUSANDS/UL (ref 149–390)
PMV BLD AUTO: 8.2 FL (ref 8.9–12.7)
POTASSIUM SERPL-SCNC: 4.3 MMOL/L (ref 3.5–5.3)
PROT SERPL-MCNC: 7.9 G/DL (ref 6.4–8.2)
RBC # BLD AUTO: 5.71 MILLION/UL (ref 3.88–5.62)
SODIUM SERPL-SCNC: 138 MMOL/L (ref 136–145)
WBC # BLD AUTO: 8.41 THOUSAND/UL (ref 4.31–10.16)

## 2018-10-12 PROCEDURE — 86140 C-REACTIVE PROTEIN: CPT | Performed by: PHYSICIAN ASSISTANT

## 2018-10-12 PROCEDURE — 99214 OFFICE O/P EST MOD 30 MIN: CPT | Performed by: PHYSICIAN ASSISTANT

## 2018-10-12 PROCEDURE — 80053 COMPREHEN METABOLIC PANEL: CPT | Performed by: PHYSICIAN ASSISTANT

## 2018-10-12 PROCEDURE — 36415 COLL VENOUS BLD VENIPUNCTURE: CPT | Performed by: PHYSICIAN ASSISTANT

## 2018-10-12 PROCEDURE — 85025 COMPLETE CBC W/AUTO DIFF WBC: CPT | Performed by: PHYSICIAN ASSISTANT

## 2018-10-12 PROCEDURE — 85652 RBC SED RATE AUTOMATED: CPT | Performed by: PHYSICIAN ASSISTANT

## 2018-10-12 RX ORDER — CYCLOBENZAPRINE HCL 10 MG
10 TABLET ORAL 2 TIMES DAILY PRN
Qty: 30 TABLET | Refills: 0 | Status: SHIPPED | OUTPATIENT
Start: 2018-10-12 | End: 2019-01-11 | Stop reason: SDUPTHER

## 2018-10-12 NOTE — PROGRESS NOTES
Assessment and Plan: This is a 27-year-old gentleman presenting today for follow-up for ankylosing spondylitis  The patient states that his joint pain has been stable since his last appointment  He does note ongoing low back pain which tends to be worse after activity and as the day goes on  He states he does find that he is forced to lean forward when he is on his feet for long periods of time  He denies morning stiffness but states that his stiffness does become worse as the day goes on  He does currently utilize Enbrel and finds symptomatic relief with Duexis  On exam today, there is no active synovitis  He does have myofascial tender points of the cervical and thoracic paraspinal muscles  In addition, he does have limited range of motion of the lumbar spine specifically with extension  He has no SI joint tenderness and has negative Sam's test bilaterally  Patient's exam today is consistent with ankylosing spondylitis which appears to be mildly active at this time with the use of Enbrel  Patient continues to have low back pain with stiffness throughout the day resulting in restricted ROM and an antalgic gait  Patient has had ongoing moderately elevated inflammatory markers, specifically the CRP, which most likely represents activity of his ankylosing spondylitis  For better assessment of inflammatory activity, an MRI of the lumbar spine with STIR would be indicated however patient is not willing to make any further changes in treatment at this time therefore we will hold off on imaging  If patient would like to make further changes in the future, we will consider imaging with MRI  We may consider further options such as Humira or Remicade as he has experienced partial response with Enbrel  Patient was asked to obtain blood work at this time and will return to the office in 3 months time for further evaluation      Plan:  Diagnoses and all orders for this visit:    Ankylosing spondylitis of multiple sites in spine [facility])        Rheumatic Disease Summary:  Established care- August, 2017- hx of AS dx with LVH in 2006  Did not start treatment at that time although Enbrel was recommended  Then seen by Kiki Anderson  in 2009 and start on Enbrel with Celebrex, Tramadol, and Flexeril  Surgery with Dr Delmi Dozier for left labral tear of the hip  HPI  Tawanna Avalos is a 29 y o   male who presents today for follow up for AS  Still with low back pain and upper back and worse with activity through out the day  No AM stiffness in the AM but stiffness throughout the day and bending of the back as the day goes on  Worse with activity  Using Duexis with relief  Still with Enbrel  Hip is doing well and no swelling  No difficulty with sleep   +fatigue  The following portions of the patient's history were reviewed and updated as appropriate: allergies, current medications, past family history, past medical history, past social history, past surgical history and problem list     Review of Systems:   Review of Systems   Constitutional: Positive for fatigue  Negative for appetite change, chills, fever and unexpected weight change  HENT: Negative for congestion, mouth sores and sore throat  No recent infxn    Eyes: Negative for pain, redness and visual disturbance  No dry eye and mouth    Respiratory: Negative for cough, chest tightness and shortness of breath  Cardiovascular: Negative for chest pain and leg swelling  Gastrointestinal: Negative for abdominal pain, blood in stool, constipation, diarrhea, nausea and vomiting  Endocrine: Negative for polydipsia and polyuria  Genitourinary: Negative for frequency and hematuria  Skin: Negative for color change and rash  No hair loss or nail changes  No raynauds  Neurological: Positive for weakness (generalized )  Negative for light-headedness, numbness and headaches  Hematological: Negative for adenopathy  Psychiatric/Behavioral: Negative for behavioral problems  The patient is not nervous/anxious  Home Medications:     Current Outpatient Prescriptions:     cyclobenzaprine (FLEXERIL) 10 mg tablet, Take 10 mg by mouth 2 (two) times a day as needed  , Disp: , Rfl:     ENBREL 50 MG/ML SOSY, INJECT 1 ML (50 MG TOTAL) UNDER THE SKIN ONCE A WEEK SATURDAY OR SUNDAY, Disp: 11 76 mL, Rfl: 0    Ibuprofen-Famotidine (DUEXIS) 800-26 6 MG TABS, Take 1 tablet by mouth daily as needed, Disp: , Rfl:     Objective:    Vitals:    10/12/18 1013   BP: 118/70   BP Location: Left arm   Patient Position: Sitting   Cuff Size: Standard   Pulse: 82   Weight: 64 9 kg (143 lb)   Height: 6' (1 829 m)       Physical Exam   Constitutional: He is oriented to person, place, and time  He appears well-developed and well-nourished  Antalgic gait    HENT:   Head: Normocephalic  Mouth/Throat: Oropharynx is clear and moist    Eyes: Pupils are equal, round, and reactive to light  Conjunctivae are normal    Neck: Normal range of motion  Neck supple  Cardiovascular: Normal rate, regular rhythm and normal heart sounds  Pulmonary/Chest: Effort normal and breath sounds normal    Musculoskeletal:   +Myofascial tender points of the cerival paraspinal muscles  +Restricted ROM of the lumbar spine with extension  Negative Sam's test B/L  Neurological: He is alert and oriented to person, place, and time  Skin: Skin is warm and dry  Psychiatric: He has a normal mood and affect   His behavior is normal      Musculoskeletal--Peripheral Joint Exam:  Physical Exam     LYNN-28 tender joint count: 0  LYNN-28 swollen joint count: 0          Imaging:   Xray lumbar spine 8/21/2017   IMPRESSION:  Mild squaring of the lumbar vertebrae with the enthesopathic changes in the facet joint and at the level of the costotransverse compatible with history of ankylosing spondylitis     Irregularity with sclerosis at the level of the sacroiliac joint suggest sacroiliitis and seronegative spondyloarthropathy  These are new since the previous radiograph of May 28, 2008    Xray SI joints 8/21/2017  IMPRESSION:  Erosive changes noted at the both sacroiliac joint with some surrounding sclerosis suggesting sacroiliitis and seronegative spondyloarthropathy  Moderate narrowing of the left hip joint    Labs:   Component      Latest Ref Rng & Units 7/10/2018   WBC      4 31 - 10 16 Thousand/uL 7 69   RBC      3 88 - 5 62 Million/uL 5 31   Hemoglobin      12 0 - 17 0 g/dL 13 8   HCT      36 5 - 49 3 % 40 9   MCV      82 - 98 fL 77 (L)   MCH      26 8 - 34 3 pg 26 0 (L)   MCHC      31 4 - 37 4 g/dL 33 7   RDW      11 6 - 15 1 % 14 2   MPV      8 9 - 12 7 fL 8 3 (L)   Platelets      165 - 390 Thousands/uL 360   Neutrophils Relative      43 - 75 % 64   Lymphocytes Relative      14 - 44 % 22   Monocytes Relative      4 - 12 % 10   Eosinophils Relative      0 - 6 % 5   Basophils Relative      0 - 1 % 0   Neutrophils Absolute      1 85 - 7 62 Thousands/µL 4 89   Lymphocytes Absolute      0 60 - 4 47 Thousands/µL 1 68   Monocytes Absolute      0 17 - 1 22 Thousand/µL 0 74   Eosinophils Absolute      0 00 - 0 61 Thousand/µL 0 35   Basophils Absolute      0 00 - 0 10 Thousands/µL 0 03   Sodium      136 - 145 mmol/L 140   Potassium      3 5 - 5 3 mmol/L 4 3   Chloride      100 - 108 mmol/L 104   CO2      21 - 32 mmol/L 30   Anion Gap      4 - 13 mmol/L 6   BUN      5 - 25 mg/dL 15   Creatinine      0 60 - 1 30 mg/dL 1 01   Glucose      65 - 140 mg/dL 88   Calcium      8 3 - 10 1 mg/dL 8 8   AST (SGOT)      5 - 45 U/L 14   ALT      12 - 78 U/L 22   ALK PHOS      46 - 116 U/L 75   Total Protein      6 4 - 8 2 g/dL 7 4   Albumin      3 5 - 5 0 g/dL 3 5   TOTAL BILIRUBIN      0 20 - 1 00 mg/dL 0 40   eGFR      ml/min/1 73sq m 99   C-REACTIVE PROTEIN      <3 0 mg/L 27 8 (H)   ERYTHROCYTE SEDIMENTATION RATE      0 - 10 mm/hour 11 (H)

## 2018-10-30 DIAGNOSIS — M45.9 ANKYLOSING SPONDYLITIS, UNSPECIFIED SITE OF SPINE (HCC): ICD-10-CM

## 2018-10-30 RX ORDER — ETANERCEPT 50 MG/ML
SOLUTION SUBCUTANEOUS
Qty: 11.76 ML | Refills: 0 | OUTPATIENT
Start: 2018-10-30

## 2019-01-07 PROBLEM — Z79.899 ENCOUNTER FOR MONITORING OF ETANERCEPT THERAPY: Status: ACTIVE | Noted: 2019-01-07

## 2019-01-07 PROBLEM — Z79.620 ENCOUNTER FOR MONITORING OF ETANERCEPT THERAPY: Status: ACTIVE | Noted: 2019-01-07

## 2019-01-07 PROBLEM — Z79.1 NSAID LONG-TERM USE: Status: ACTIVE | Noted: 2019-01-07

## 2019-01-07 PROBLEM — Z51.81 ENCOUNTER FOR MONITORING OF ETANERCEPT THERAPY: Status: ACTIVE | Noted: 2019-01-07

## 2019-01-07 NOTE — PROGRESS NOTES
Assessment and Plan: This is a 73-year-old gentleman presenting today for ankylosing spondylitis currently utilizing Enbrel however not consistently  The patient states that he continues to have low back stiffness and discomfort which is generally worse as the day goes on  He denies  true morning stiffness  He also describes muscular discomfort of the upper back which is also worse at the end of the day  He denies any peripheral joint involvement and has no joint swelling  He has no history of uveitis, dactylitis, or inflammatory bowel disease  He also denies a history of psoriasis  He finds symptomatic relief with the use of Duexis in combination with Flexeril  His exam today does not reveal any active synovitis however he does have an antalgic gait due to forced flexion, as well as restrictive range of motion of the lumbar spine  In addition, he does have restricted range of motion with external rotation of both hips  Currently, I am uncertain how active patient's ankylosing spondylitis is  He does describe ongoing low back pain which would indicate activity of his inflammatory arthritis and xrays of 2017 have shown enthesopathic changes in the facet joints with sclerosis of the SI joints consistent with AS  His inflammatory markers remain elevated which would also suggest activity of his disease  It is however hard to assess ongoing inflammation of the lumbar spine and hips as these are larger joints on exam   I would like to obtain an MRI of the pelvis to further evaluate his inflammatory arthritis and its activity  Patient has been utilizing Enbrel however upon further questioning has only been utilizing this when he sees fit  I did stress to the patient that should utilize the medication on a weekly basis as prescribed    He will continue on all of his medications at their current doses however if imaging study does reveal activity of his AS or further bony changes, we will plan to change treatment to another anti TNF alteratives such as including Humira or Remicade for better control his ongoing back pain and prevent further joint damage  Patient will return to the office in 2 months time with Dr Adi Barajas for evaluation but contact the office in the interim if he has any further questions or concerns  Plan:  Diagnoses and all orders for this visit:    Ankylosing spondylitis of multiple sites in spine [facility])    Encounter for monitoring of etanercept therapy    NSAID long-term use        Rheumatic Disease Summary:  Established care- August, 2017- hx of AS dx with LVH in 2006  Did not start treatment at that time although Enbrel was recommended  Then seen by Kiki  68  in 2009 and start on Enbrel with Celebrex, Tramadol, and Flexeril  Surgery with Dr Damaris Phillips for left labral tear of the hip  HPI  Nga Wolfe is a 34 y o   male who presents today for follow up for AS  Still with low back and and upper back which is sore at the end of the day  No AM stiffness but worse stiffness throughout the day  No peripheral joint pain  Had surgery of the left hip, hip pain resolved  No obvious joint swelling  No difficulty with sleep  No fatigue  Still with Enbrel, not consistently using this however  Trying to work on diet to see if these improves symptoms  Using Flexeril and Duexis with symptomatic relief  No uveitis, IBD, or dactylitis history  The following portions of the patient's history were reviewed and updated as appropriate: allergies, current medications, past family history, past medical history, past social history, past surgical history and problem list     Review of Systems:   Review of Systems   Constitutional: Negative for appetite change, chills, fatigue, fever and unexpected weight change  HENT: Negative for congestion, mouth sores and sore throat  No recent infection    Eyes: Negative for pain, redness and visual disturbance          No dry eyes or dry mouth    Respiratory: Negative for cough, chest tightness and shortness of breath  Cardiovascular: Negative for chest pain and leg swelling  Gastrointestinal: Negative for abdominal pain, blood in stool, constipation, diarrhea, nausea and vomiting  Endocrine: Negative for polydipsia and polyuria  Genitourinary: Negative for frequency and hematuria  Skin: Negative for color change and rash  No hair loss or nail changes  No raynauds    Neurological: Negative for weakness, light-headedness, numbness and headaches  Hematological: Negative for adenopathy  Psychiatric/Behavioral: Negative for behavioral problems  The patient is not nervous/anxious  Home Medications:     Current Outpatient Prescriptions:     cyclobenzaprine (FLEXERIL) 10 mg tablet, Take 1 tablet (10 mg total) by mouth 2 (two) times a day as needed for muscle spasms, Disp: 30 tablet, Rfl: 0    etanercept (ENBREL) 50 mg/mL SOSY, Inject 1 mL (50 mg total) under the skin once a week Saturday or Sunday, Disp: 12 Syringe, Rfl: 3    Ibuprofen-Famotidine (DUEXIS) 800-26 6 MG TABS, Take 1 tablet by mouth daily as needed, Disp: , Rfl:     Objective:    Vitals:    01/11/19 1044   BP: 124/80   BP Location: Left arm   Patient Position: Sitting   Cuff Size: Standard   Pulse: 80   Weight: 66 2 kg (146 lb)   Height: 6' (1 829 m)       Physical Exam   Constitutional: He is oriented to person, place, and time  He appears well-developed and well-nourished  Antalgic gait with lumbar spine in flexion   HENT:   Head: Normocephalic  Mouth/Throat: Oropharynx is clear and moist    Eyes: Pupils are equal, round, and reactive to light  Conjunctivae are normal    Neck: Normal range of motion  Neck supple  Cardiovascular: Normal rate, regular rhythm and normal heart sounds      Pulmonary/Chest: Effort normal and breath sounds normal    Musculoskeletal:   +Restricted ROM of the hips with external rotation and of the lumbar spine with extension and lateral bend B/L  Neurological: He is alert and oriented to person, place, and time  Skin: Skin is warm and dry  Psychiatric: He has a normal mood and affect   His behavior is normal      Musculoskeletal--Peripheral Joint Exam:  Physical Exam     LYNN-28 tender joint count: 0  LYNN-28 swollen joint count: 0          Imaging:   Xray lumbar spine 8/21/2017   IMPRESSION:  Mild squaring of the lumbar vertebrae with the enthesopathic changes in the facet joint and at the level of the costotransverse compatible with history of ankylosing spondylitis     Irregularity with sclerosis at the level of the sacroiliac joint suggest sacroiliitis and seronegative spondyloarthropathy  These are new since the previous radiograph of May 28, 2008     Xray SI joints 8/21/2017  IMPRESSION:  Erosive changes noted at the both sacroiliac joint with some surrounding sclerosis suggesting sacroiliitis and seronegative spondyloarthropathy  Moderate narrowing of the left hip joint      Labs:  Component      Latest Ref Rng & Units 7/21/2017   HLA B27       Positive

## 2019-01-11 ENCOUNTER — APPOINTMENT (OUTPATIENT)
Dept: LAB | Facility: CLINIC | Age: 34
End: 2019-01-11
Payer: COMMERCIAL

## 2019-01-11 ENCOUNTER — OFFICE VISIT (OUTPATIENT)
Dept: RHEUMATOLOGY | Facility: CLINIC | Age: 34
End: 2019-01-11
Payer: COMMERCIAL

## 2019-01-11 ENCOUNTER — TELEPHONE (OUTPATIENT)
Dept: RHEUMATOLOGY | Facility: CLINIC | Age: 34
End: 2019-01-11

## 2019-01-11 VITALS
BODY MASS INDEX: 19.77 KG/M2 | DIASTOLIC BLOOD PRESSURE: 80 MMHG | HEIGHT: 72 IN | SYSTOLIC BLOOD PRESSURE: 124 MMHG | HEART RATE: 80 BPM | WEIGHT: 146 LBS

## 2019-01-11 DIAGNOSIS — Z79.899 ENCOUNTER FOR MONITORING OF ETANERCEPT THERAPY: ICD-10-CM

## 2019-01-11 DIAGNOSIS — Z79.1 NSAID LONG-TERM USE: ICD-10-CM

## 2019-01-11 DIAGNOSIS — M45.0 ANKYLOSING SPONDYLITIS OF MULTIPLE SITES IN SPINE (HCC): Primary | ICD-10-CM

## 2019-01-11 DIAGNOSIS — Z51.81 ENCOUNTER FOR MONITORING OF ETANERCEPT THERAPY: ICD-10-CM

## 2019-01-11 DIAGNOSIS — M45.9 ANKYLOSING SPONDYLITIS, UNSPECIFIED SITE OF SPINE (HCC): ICD-10-CM

## 2019-01-11 LAB
ALBUMIN SERPL BCP-MCNC: 3.8 G/DL (ref 3.5–5)
ALP SERPL-CCNC: 86 U/L (ref 46–116)
ALT SERPL W P-5'-P-CCNC: 22 U/L (ref 12–78)
ANION GAP SERPL CALCULATED.3IONS-SCNC: 8 MMOL/L (ref 4–13)
AST SERPL W P-5'-P-CCNC: 13 U/L (ref 5–45)
BASOPHILS # BLD AUTO: 0.05 THOUSANDS/ΜL (ref 0–0.1)
BASOPHILS NFR BLD AUTO: 1 % (ref 0–1)
BILIRUB SERPL-MCNC: 0.6 MG/DL (ref 0.2–1)
BUN SERPL-MCNC: 18 MG/DL (ref 5–25)
CALCIUM SERPL-MCNC: 9.6 MG/DL (ref 8.3–10.1)
CHLORIDE SERPL-SCNC: 101 MMOL/L (ref 100–108)
CO2 SERPL-SCNC: 30 MMOL/L (ref 21–32)
CREAT SERPL-MCNC: 0.9 MG/DL (ref 0.6–1.3)
CRP SERPL QL: 39 MG/L
EOSINOPHIL # BLD AUTO: 0.3 THOUSAND/ΜL (ref 0–0.61)
EOSINOPHIL NFR BLD AUTO: 3 % (ref 0–6)
ERYTHROCYTE [DISTWIDTH] IN BLOOD BY AUTOMATED COUNT: 13.9 % (ref 11.6–15.1)
ERYTHROCYTE [SEDIMENTATION RATE] IN BLOOD: 12 MM/HOUR (ref 0–10)
GFR SERPL CREATININE-BSD FRML MDRD: 113 ML/MIN/1.73SQ M
GLUCOSE SERPL-MCNC: 85 MG/DL (ref 65–140)
HCT VFR BLD AUTO: 45.5 % (ref 36.5–49.3)
HGB BLD-MCNC: 14.5 G/DL (ref 12–17)
IMM GRANULOCYTES # BLD AUTO: 0.02 THOUSAND/UL (ref 0–0.2)
IMM GRANULOCYTES NFR BLD AUTO: 0 % (ref 0–2)
LYMPHOCYTES # BLD AUTO: 1.69 THOUSANDS/ΜL (ref 0.6–4.47)
LYMPHOCYTES NFR BLD AUTO: 19 % (ref 14–44)
MCH RBC QN AUTO: 25.6 PG (ref 26.8–34.3)
MCHC RBC AUTO-ENTMCNC: 31.9 G/DL (ref 31.4–37.4)
MCV RBC AUTO: 80 FL (ref 82–98)
MONOCYTES # BLD AUTO: 0.86 THOUSAND/ΜL (ref 0.17–1.22)
MONOCYTES NFR BLD AUTO: 10 % (ref 4–12)
NEUTROPHILS # BLD AUTO: 6.11 THOUSANDS/ΜL (ref 1.85–7.62)
NEUTS SEG NFR BLD AUTO: 67 % (ref 43–75)
NRBC BLD AUTO-RTO: 0 /100 WBCS
PLATELET # BLD AUTO: 374 THOUSANDS/UL (ref 149–390)
PMV BLD AUTO: 8.5 FL (ref 8.9–12.7)
POTASSIUM SERPL-SCNC: 4.5 MMOL/L (ref 3.5–5.3)
PROT SERPL-MCNC: 8.1 G/DL (ref 6.4–8.2)
RBC # BLD AUTO: 5.66 MILLION/UL (ref 3.88–5.62)
SODIUM SERPL-SCNC: 139 MMOL/L (ref 136–145)
TSH SERPL DL<=0.05 MIU/L-ACNC: 1.68 UIU/ML (ref 0.36–3.74)
WBC # BLD AUTO: 9.03 THOUSAND/UL (ref 4.31–10.16)

## 2019-01-11 PROCEDURE — 85652 RBC SED RATE AUTOMATED: CPT | Performed by: PHYSICIAN ASSISTANT

## 2019-01-11 PROCEDURE — 86140 C-REACTIVE PROTEIN: CPT | Performed by: PHYSICIAN ASSISTANT

## 2019-01-11 PROCEDURE — 36415 COLL VENOUS BLD VENIPUNCTURE: CPT | Performed by: PHYSICIAN ASSISTANT

## 2019-01-11 PROCEDURE — 99214 OFFICE O/P EST MOD 30 MIN: CPT | Performed by: PHYSICIAN ASSISTANT

## 2019-01-11 PROCEDURE — 84443 ASSAY THYROID STIM HORMONE: CPT | Performed by: PHYSICIAN ASSISTANT

## 2019-01-11 PROCEDURE — 85025 COMPLETE CBC W/AUTO DIFF WBC: CPT | Performed by: PHYSICIAN ASSISTANT

## 2019-01-11 PROCEDURE — 80053 COMPREHEN METABOLIC PANEL: CPT | Performed by: PHYSICIAN ASSISTANT

## 2019-01-11 RX ORDER — IBUPROFEN AND FAMOTIDINE 26.6; 8 MG/1; MG/1
1 TABLET, FILM COATED ORAL DAILY PRN
Qty: 90 TABLET | Refills: 3 | Status: SHIPPED | OUTPATIENT
Start: 2019-01-11 | End: 2019-08-23 | Stop reason: SDUPTHER

## 2019-01-11 RX ORDER — CYCLOBENZAPRINE HCL 10 MG
10 TABLET ORAL 2 TIMES DAILY PRN
Qty: 180 TABLET | Refills: 1 | Status: SHIPPED | OUTPATIENT
Start: 2019-01-11 | End: 2019-03-15

## 2019-01-11 NOTE — TELEPHONE ENCOUNTER
I ordered a MRI of the pelvis for this patient to assess status of SI joints and inflammatory arthritis, can you do prior auth? Thanks

## 2019-01-11 NOTE — TELEPHONE ENCOUNTER
Tadeo Jacobo obtained the authorization with the following info: [id number] valid 1/11/19 - 2/10/19  Tadeo Jacobo called and spoke with Ashely Valladares and gave him this information  He will call central scheduling to schedule

## 2019-01-16 ENCOUNTER — HOSPITAL ENCOUNTER (OUTPATIENT)
Dept: MRI IMAGING | Facility: HOSPITAL | Age: 34
Discharge: HOME | End: 2019-01-16
Payer: COMMERCIAL

## 2019-01-16 DIAGNOSIS — M45.0 ANKYLOSING SPONDYLITIS OF MULTIPLE SITES IN SPINE (HCC): ICD-10-CM

## 2019-01-16 PROCEDURE — 72195 MRI PELVIS W/O DYE: CPT

## 2019-03-14 NOTE — PROGRESS NOTES
Assessment and Plan: This is a 37-year-old gentleman presenting today for follow-up for ankylosing spondylitis currently utilizing Enbrel with Duexis and Flexeril as needed for symptomatic relief  The patient states overall he has been doing well aside from stiffness in the neck, low back and into his shoulders which he feels is mostly muscular  He states that he does find relief with the use of Flexeril for the symptoms but it does make him feel drowsy  He denies morning stiffness but states this stiffness tends to be worse throughout the day and after activity  He has no peripheral joint involvement and denies any other obvious joint pain or swelling  He also denies any symptoms consistent with dactylitis, uveitis, or enthesitis  He also denies any history of psoriasis or IBD  Patient's ankylosing spondylitis appears to be fairly stable with the use of Enbrel  He was sent for an MRI of the pelvis since last appointment which did not show any active sacroiliitis however there was presence of active enthesitis  Since patient is not currently having any increased symptoms or change in symptoms, he will remain on Enbrel at this time as I do believe it is currently controlling his inflammatory arthrisi  He states the Flexeril does help with his muscular pain however it does make him feel drowsy and he is requesting another option to utilize during the day without these side effects  I will provide him with a small supply of Robaxin 500mg to utilize for spasms during the day  His most recent labs were stable aside from elevated inflammatory markers, which are unchanged  Patient was advised to return to the office in 3 months time but contact the office in the interim if he has any further questions or concerns    If he does have evidence of synovitis or further changes in symptoms that may suggest activity of his inflammatory arthritis in the future, we may consider further options such Humira or Cosentyx  Plan:  Diagnoses and all orders for this visit:    Ankylosing spondylitis of multiple sites in spine [facility])    Encounter for monitoring of etanercept therapy    NSAID long-term use          Rheumatic Disease Summary:  1  AS  Established care- August, 2017- hx of AS dx with LVH in 2006  Did not start treatment at that time although Enbrel was recommended   Then seen by [facility] in 2009 and start on Enbrel with Celebrex, Tramadol, and Flexeril   Surgery with Dr Jeimy Healy for left labral tear of the hip  2  Drug Monitoring       HPI  Ozzy Mark is a 34 y o   male who presents today for follow up for AS  Still with stiffness towards end of day and worse with activity primarily in the neck, low back and shoulders  No obvious joint swelling  No AM stiffness  No dacylitis, uveitis, enthetis  No PsO and no family history  Using Flexeril with improvement of muscle pain and fatigue quicker  Duexis helps with stiffness  The following portions of the patient's history were reviewed and updated as appropriate: allergies, current medications, past family history, past medical history, past social history, past surgical history and problem list     Review of Systems:   Review of Systems   Constitutional: Negative for appetite change, chills, fatigue, fever and unexpected weight change  HENT: Positive for postnasal drip  Negative for congestion, mouth sores and sore throat  No recent infxn    Eyes: Negative for pain, redness and visual disturbance  No dry eyes or dry mouth    Respiratory: Negative for cough, chest tightness and shortness of breath  Cardiovascular: Negative for chest pain and leg swelling  Gastrointestinal: Negative for abdominal pain, blood in stool, constipation, diarrhea, nausea and vomiting  Endocrine: Negative for polydipsia and polyuria  Genitourinary: Negative for frequency and hematuria     Skin: Negative for color change and rash         No hair loss or nail changes  No raynauds    Neurological: Negative for weakness, light-headedness, numbness and headaches  Hematological: Negative for adenopathy  Psychiatric/Behavioral: Negative for behavioral problems  The patient is not nervous/anxious  Home Medications:     Current Outpatient Medications:     cyclobenzaprine (FLEXERIL) 10 mg tablet, Take 1 tablet (10 mg total) by mouth 2 (two) times a day as needed for muscle spasms, Disp: 180 tablet, Rfl: 1    etanercept (ENBREL SURECLICK) 50 MG/ML injection, Inject 1 mL (50 mg total) under the skin once a week, Disp: 12 Syringe, Rfl: 3    Ibuprofen-Famotidine (DUEXIS) 800-26 6 MG TABS, Take 1 tablet by mouth daily as needed (pain), Disp: 90 tablet, Rfl: 3    Objective:    Vitals:    03/15/19 1101   Pulse: 82   Weight: 66 2 kg (146 lb)   Height: 6' (1 829 m)       Physical Exam   Constitutional: He is oriented to person, place, and time  He appears well-developed and well-nourished  Antalgic gait   HENT:   Head: Normocephalic  Mouth/Throat: Oropharynx is clear and moist    Eyes: Pupils are equal, round, and reactive to light  Conjunctivae are normal    Neck: Normal range of motion  Neck supple  Cardiovascular: Normal rate, regular rhythm and normal heart sounds  Pulmonary/Chest: Effort normal and breath sounds normal    Musculoskeletal:   +Restricted ROM of the lumbar spine with extension and lateral bend  Restricted ROM of the hips B/L  Crepitus of the knees  Neurological: He is alert and oriented to person, place, and time  Skin: Skin is warm and dry  Psychiatric: He has a normal mood and affect   His behavior is normal      Musculoskeletal--Peripheral Joint Exam:  Physical Exam     LYNN-28 tender joint count: 0  LYNN-28 swollen joint count: 0  CRP (mg/L): 39  Patient global assessment: 40  LYNN-28 CRP score: 2 85 (Low Disease Activity)          Imaging:   Xray lumbar spine 8/21/2017   IMPRESSION:  Mild squaring of the lumbar vertebrae with the enthesopathic changes in the facet joint and at the level of the costotransverse compatible with history of ankylosing spondylitis     Irregularity with sclerosis at the level of the sacroiliac joint suggest sacroiliitis and seronegative spondyloarthropathy  These are new since the previous radiograph of May 28, 2008     Xray SI joints 8/21/2017  IMPRESSION:  Erosive changes noted at the both sacroiliac joint with some surrounding sclerosis suggesting sacroiliitis and seronegative spondyloarthropathy  Moderate narrowing of the left hip joint      MRI Bony Pelvis 1/16/2019  IMPRESSION:  Mild bilateral chronic sacroiliitis  No edema, effusion, or definitive erosions  Minimal right mid SI joint ankylosis  Mild new left iliopsoas insertional and right common hamstring tendon origin presumed active enthesitis with minimal underlying reactive osseous edema  Differential includes acute to subacute strain  No full-thickness tendon rupture  Mild bilateral hip osteoarthritis  Tiny bilateral CAM lesions compatible with chronic femoral acetabular impingement syndrome      Labs:   Component      Latest Ref Rng & Units 7/21/2017   HLA B27       Positive

## 2019-03-15 ENCOUNTER — OFFICE VISIT (OUTPATIENT)
Dept: RHEUMATOLOGY | Facility: CLINIC | Age: 34
End: 2019-03-15
Payer: COMMERCIAL

## 2019-03-15 VITALS — WEIGHT: 146 LBS | HEART RATE: 82 BPM | BODY MASS INDEX: 19.77 KG/M2 | HEIGHT: 72 IN

## 2019-03-15 DIAGNOSIS — Z79.899 ENCOUNTER FOR MONITORING OF ETANERCEPT THERAPY: ICD-10-CM

## 2019-03-15 DIAGNOSIS — M45.0 ANKYLOSING SPONDYLITIS OF MULTIPLE SITES IN SPINE (HCC): Primary | ICD-10-CM

## 2019-03-15 DIAGNOSIS — Z51.81 ENCOUNTER FOR MONITORING OF ETANERCEPT THERAPY: ICD-10-CM

## 2019-03-15 DIAGNOSIS — Z79.1 NSAID LONG-TERM USE: ICD-10-CM

## 2019-03-15 PROCEDURE — 99214 OFFICE O/P EST MOD 30 MIN: CPT | Performed by: PHYSICIAN ASSISTANT

## 2019-03-15 RX ORDER — METHOCARBAMOL 500 MG/1
500 TABLET, FILM COATED ORAL DAILY
Qty: 30 TABLET | Refills: 0 | Status: SHIPPED | OUTPATIENT
Start: 2019-03-15 | End: 2019-08-23 | Stop reason: SDUPTHER

## 2019-06-14 ENCOUNTER — OFFICE VISIT (OUTPATIENT)
Dept: RHEUMATOLOGY | Facility: CLINIC | Age: 34
End: 2019-06-14
Payer: COMMERCIAL

## 2019-06-14 ENCOUNTER — APPOINTMENT (OUTPATIENT)
Dept: LAB | Facility: CLINIC | Age: 34
End: 2019-06-14
Payer: COMMERCIAL

## 2019-06-14 VITALS
HEIGHT: 72 IN | WEIGHT: 148.4 LBS | HEART RATE: 80 BPM | DIASTOLIC BLOOD PRESSURE: 76 MMHG | BODY MASS INDEX: 20.1 KG/M2 | SYSTOLIC BLOOD PRESSURE: 118 MMHG

## 2019-06-14 DIAGNOSIS — M45.0 ANKYLOSING SPONDYLITIS OF MULTIPLE SITES IN SPINE (HCC): Primary | ICD-10-CM

## 2019-06-14 DIAGNOSIS — M16.12 PRIMARY LOCALIZED OSTEOARTHRITIS OF LEFT HIP: ICD-10-CM

## 2019-06-14 DIAGNOSIS — Z79.899 ENCOUNTER FOR MONITORING OF ETANERCEPT THERAPY: ICD-10-CM

## 2019-06-14 DIAGNOSIS — Z79.1 NSAID LONG-TERM USE: ICD-10-CM

## 2019-06-14 DIAGNOSIS — Z51.81 ENCOUNTER FOR MONITORING OF ETANERCEPT THERAPY: ICD-10-CM

## 2019-06-14 LAB
ALBUMIN SERPL BCP-MCNC: 4 G/DL (ref 3.5–5)
ALP SERPL-CCNC: 96 U/L (ref 46–116)
ALT SERPL W P-5'-P-CCNC: 22 U/L (ref 12–78)
ANION GAP SERPL CALCULATED.3IONS-SCNC: 8 MMOL/L (ref 4–13)
AST SERPL W P-5'-P-CCNC: 16 U/L (ref 5–45)
BASOPHILS # BLD AUTO: 0.04 THOUSANDS/ΜL (ref 0–0.1)
BASOPHILS NFR BLD AUTO: 0 % (ref 0–1)
BILIRUB SERPL-MCNC: 0.5 MG/DL (ref 0.2–1)
BUN SERPL-MCNC: 15 MG/DL (ref 5–25)
CALCIUM SERPL-MCNC: 9.8 MG/DL (ref 8.3–10.1)
CHLORIDE SERPL-SCNC: 101 MMOL/L (ref 100–108)
CO2 SERPL-SCNC: 31 MMOL/L (ref 21–32)
CREAT SERPL-MCNC: 1 MG/DL (ref 0.6–1.3)
CRP SERPL QL: 19.1 MG/L
EOSINOPHIL # BLD AUTO: 0.32 THOUSAND/ΜL (ref 0–0.61)
EOSINOPHIL NFR BLD AUTO: 4 % (ref 0–6)
ERYTHROCYTE [DISTWIDTH] IN BLOOD BY AUTOMATED COUNT: 14.4 % (ref 11.6–15.1)
ERYTHROCYTE [SEDIMENTATION RATE] IN BLOOD: 6 MM/HOUR (ref 0–10)
GFR SERPL CREATININE-BSD FRML MDRD: 100 ML/MIN/1.73SQ M
GLUCOSE SERPL-MCNC: 95 MG/DL (ref 65–140)
HCT VFR BLD AUTO: 49.7 % (ref 36.5–49.3)
HGB BLD-MCNC: 15.7 G/DL (ref 12–17)
IMM GRANULOCYTES # BLD AUTO: 0.03 THOUSAND/UL (ref 0–0.2)
IMM GRANULOCYTES NFR BLD AUTO: 0 % (ref 0–2)
LYMPHOCYTES # BLD AUTO: 1.69 THOUSANDS/ΜL (ref 0.6–4.47)
LYMPHOCYTES NFR BLD AUTO: 19 % (ref 14–44)
MCH RBC QN AUTO: 26.3 PG (ref 26.8–34.3)
MCHC RBC AUTO-ENTMCNC: 31.6 G/DL (ref 31.4–37.4)
MCV RBC AUTO: 83 FL (ref 82–98)
MONOCYTES # BLD AUTO: 0.76 THOUSAND/ΜL (ref 0.17–1.22)
MONOCYTES NFR BLD AUTO: 8 % (ref 4–12)
NEUTROPHILS # BLD AUTO: 6.23 THOUSANDS/ΜL (ref 1.85–7.62)
NEUTS SEG NFR BLD AUTO: 69 % (ref 43–75)
NRBC BLD AUTO-RTO: 0 /100 WBCS
PLATELET # BLD AUTO: 344 THOUSANDS/UL (ref 149–390)
PMV BLD AUTO: 8.1 FL (ref 8.9–12.7)
POTASSIUM SERPL-SCNC: 4.5 MMOL/L (ref 3.5–5.3)
PROT SERPL-MCNC: 8.5 G/DL (ref 6.4–8.2)
RBC # BLD AUTO: 5.96 MILLION/UL (ref 3.88–5.62)
SODIUM SERPL-SCNC: 140 MMOL/L (ref 136–145)
WBC # BLD AUTO: 9.07 THOUSAND/UL (ref 4.31–10.16)

## 2019-06-14 PROCEDURE — 36415 COLL VENOUS BLD VENIPUNCTURE: CPT | Performed by: PHYSICIAN ASSISTANT

## 2019-06-14 PROCEDURE — 85025 COMPLETE CBC W/AUTO DIFF WBC: CPT | Performed by: PHYSICIAN ASSISTANT

## 2019-06-14 PROCEDURE — 80053 COMPREHEN METABOLIC PANEL: CPT | Performed by: PHYSICIAN ASSISTANT

## 2019-06-14 PROCEDURE — 85652 RBC SED RATE AUTOMATED: CPT | Performed by: PHYSICIAN ASSISTANT

## 2019-06-14 PROCEDURE — 86140 C-REACTIVE PROTEIN: CPT | Performed by: PHYSICIAN ASSISTANT

## 2019-06-14 PROCEDURE — 99214 OFFICE O/P EST MOD 30 MIN: CPT | Performed by: PHYSICIAN ASSISTANT

## 2019-08-23 ENCOUNTER — TELEPHONE (OUTPATIENT)
Dept: OBGYN CLINIC | Facility: CLINIC | Age: 34
End: 2019-08-23

## 2019-08-23 DIAGNOSIS — M45.0 ANKYLOSING SPONDYLITIS OF MULTIPLE SITES IN SPINE (HCC): ICD-10-CM

## 2019-08-23 DIAGNOSIS — Z79.899 ENCOUNTER FOR MONITORING OF ETANERCEPT THERAPY: ICD-10-CM

## 2019-08-23 DIAGNOSIS — Z51.81 ENCOUNTER FOR MONITORING OF ETANERCEPT THERAPY: ICD-10-CM

## 2019-08-23 RX ORDER — IBUPROFEN AND FAMOTIDINE 26.6; 8 MG/1; MG/1
1 TABLET, FILM COATED ORAL DAILY PRN
Qty: 90 TABLET | Refills: 3 | Status: SHIPPED | OUTPATIENT
Start: 2019-08-23 | End: 2020-06-08 | Stop reason: SDUPTHER

## 2019-08-23 RX ORDER — METHOCARBAMOL 500 MG/1
500 TABLET, FILM COATED ORAL DAILY
Qty: 30 TABLET | Refills: 0 | Status: SHIPPED | OUTPATIENT
Start: 2019-08-23 | End: 2019-09-27 | Stop reason: SDUPTHER

## 2019-08-23 NOTE — TELEPHONE ENCOUNTER
Pt contacted Call Center requested refill of their medication  Medication Name: duexis      Dosage of Med: 800-26 6 mg       Frequency of Med: 1 x day      Remaining Medication: 0      Pharmacy and Location: [address]  [facility]     ALSO      Medication Name: methocarbamol      Dosage of Med: 500 mg      Frequency of Med: 1 x day      Remaining Medication: 0      Pharmacy and Location: [facility] [address] [facility] Preferred Callback Phone Number:  [phone]      Thank you

## 2019-09-16 ENCOUNTER — APPOINTMENT (OUTPATIENT)
Dept: LAB | Facility: CLINIC | Age: 34
End: 2019-09-16
Payer: COMMERCIAL

## 2019-09-16 ENCOUNTER — TELEPHONE (OUTPATIENT)
Dept: RHEUMATOLOGY | Facility: CLINIC | Age: 34
End: 2019-09-16

## 2019-09-16 ENCOUNTER — OFFICE VISIT (OUTPATIENT)
Dept: RHEUMATOLOGY | Facility: CLINIC | Age: 34
End: 2019-09-16
Payer: COMMERCIAL

## 2019-09-16 VITALS
SYSTOLIC BLOOD PRESSURE: 120 MMHG | HEIGHT: 72 IN | DIASTOLIC BLOOD PRESSURE: 70 MMHG | WEIGHT: 145.4 LBS | HEART RATE: 75 BPM | BODY MASS INDEX: 19.69 KG/M2

## 2019-09-16 DIAGNOSIS — Z79.899 LONG TERM USE OF DRUG: ICD-10-CM

## 2019-09-16 DIAGNOSIS — Z79.1 NSAID LONG-TERM USE: ICD-10-CM

## 2019-09-16 DIAGNOSIS — M45.0 ANKYLOSING SPONDYLITIS OF MULTIPLE SITES IN SPINE (HCC): Primary | ICD-10-CM

## 2019-09-16 DIAGNOSIS — Z51.81 ENCOUNTER FOR MONITORING OF ETANERCEPT THERAPY: ICD-10-CM

## 2019-09-16 DIAGNOSIS — Z79.899 ENCOUNTER FOR MONITORING OF ETANERCEPT THERAPY: ICD-10-CM

## 2019-09-16 DIAGNOSIS — Z11.1 SCREENING-PULMONARY TB: ICD-10-CM

## 2019-09-16 LAB
ALBUMIN SERPL BCP-MCNC: 3.8 G/DL (ref 3.5–5)
ALP SERPL-CCNC: 84 U/L (ref 46–116)
ALT SERPL W P-5'-P-CCNC: 18 U/L (ref 12–78)
ANION GAP SERPL CALCULATED.3IONS-SCNC: 9 MMOL/L (ref 4–13)
AST SERPL W P-5'-P-CCNC: 17 U/L (ref 5–45)
BASOPHILS # BLD AUTO: 0.03 THOUSANDS/ΜL (ref 0–0.1)
BASOPHILS NFR BLD AUTO: 0 % (ref 0–1)
BILIRUB SERPL-MCNC: 0.6 MG/DL (ref 0.2–1)
BUN SERPL-MCNC: 15 MG/DL (ref 5–25)
CALCIUM SERPL-MCNC: 8.9 MG/DL (ref 8.3–10.1)
CHLORIDE SERPL-SCNC: 103 MMOL/L (ref 100–108)
CO2 SERPL-SCNC: 28 MMOL/L (ref 21–32)
CREAT SERPL-MCNC: 0.84 MG/DL (ref 0.6–1.3)
CRP SERPL QL: 20.2 MG/L
EOSINOPHIL # BLD AUTO: 0.24 THOUSAND/ΜL (ref 0–0.61)
EOSINOPHIL NFR BLD AUTO: 3 % (ref 0–6)
ERYTHROCYTE [DISTWIDTH] IN BLOOD BY AUTOMATED COUNT: 13.6 % (ref 11.6–15.1)
ERYTHROCYTE [SEDIMENTATION RATE] IN BLOOD: 9 MM/HOUR (ref 0–10)
GFR SERPL CREATININE-BSD FRML MDRD: 117 ML/MIN/1.73SQ M
GLUCOSE SERPL-MCNC: 89 MG/DL (ref 65–140)
HCT VFR BLD AUTO: 47.3 % (ref 36.5–49.3)
HGB BLD-MCNC: 15.1 G/DL (ref 12–17)
IMM GRANULOCYTES # BLD AUTO: 0.02 THOUSAND/UL (ref 0–0.2)
IMM GRANULOCYTES NFR BLD AUTO: 0 % (ref 0–2)
LYMPHOCYTES # BLD AUTO: 1.95 THOUSANDS/ΜL (ref 0.6–4.47)
LYMPHOCYTES NFR BLD AUTO: 23 % (ref 14–44)
MCH RBC QN AUTO: 27 PG (ref 26.8–34.3)
MCHC RBC AUTO-ENTMCNC: 31.9 G/DL (ref 31.4–37.4)
MCV RBC AUTO: 85 FL (ref 82–98)
MONOCYTES # BLD AUTO: 0.83 THOUSAND/ΜL (ref 0.17–1.22)
MONOCYTES NFR BLD AUTO: 10 % (ref 4–12)
NEUTROPHILS # BLD AUTO: 5.49 THOUSANDS/ΜL (ref 1.85–7.62)
NEUTS SEG NFR BLD AUTO: 64 % (ref 43–75)
NRBC BLD AUTO-RTO: 0 /100 WBCS
PLATELET # BLD AUTO: 333 THOUSANDS/UL (ref 149–390)
PMV BLD AUTO: 8.5 FL (ref 8.9–12.7)
POTASSIUM SERPL-SCNC: 4 MMOL/L (ref 3.5–5.3)
PROT SERPL-MCNC: 8 G/DL (ref 6.4–8.2)
RBC # BLD AUTO: 5.6 MILLION/UL (ref 3.88–5.62)
SODIUM SERPL-SCNC: 140 MMOL/L (ref 136–145)
WBC # BLD AUTO: 8.56 THOUSAND/UL (ref 4.31–10.16)

## 2019-09-16 PROCEDURE — 80053 COMPREHEN METABOLIC PANEL: CPT | Performed by: INTERNAL MEDICINE

## 2019-09-16 PROCEDURE — 85025 COMPLETE CBC W/AUTO DIFF WBC: CPT | Performed by: INTERNAL MEDICINE

## 2019-09-16 PROCEDURE — 85652 RBC SED RATE AUTOMATED: CPT | Performed by: INTERNAL MEDICINE

## 2019-09-16 PROCEDURE — 86480 TB TEST CELL IMMUN MEASURE: CPT | Performed by: INTERNAL MEDICINE

## 2019-09-16 PROCEDURE — 86140 C-REACTIVE PROTEIN: CPT | Performed by: INTERNAL MEDICINE

## 2019-09-16 PROCEDURE — 99214 OFFICE O/P EST MOD 30 MIN: CPT | Performed by: INTERNAL MEDICINE

## 2019-09-16 PROCEDURE — 36415 COLL VENOUS BLD VENIPUNCTURE: CPT | Performed by: INTERNAL MEDICINE

## 2019-09-16 NOTE — TELEPHONE ENCOUNTER
Please send prior auth for remicade IV 5mg/kg (current weight 66kg = 330mg), loading doses at weeks 0, 2, and 6, and then to continue with maintenance 5mg/kg every 6 weeks for ankylosing spondylitis  Failed NSAIDs including ibuprofen and celebrex, also failed enbrel

## 2019-09-16 NOTE — PROGRESS NOTES
Assessment and Plan:   The patient is a 53-year-old male with longstanding ankylosing spondylitis, on Enbrel for the past 10 years, presents for rheumatology follow-up  He is having persistent symptoms of mid and lower back pain which seemed to progress as his day progresses and with too much activity  He does still report the need to stretch out and loosen up in the morning, but does not feel this is as prominent as it was when he was initially diagnosed, and feels that the back pain he is having now is unusual because he typically does feel better with activity, but is currently feeling worse  He has no peripheral joint disease and states that he never has in the past   He is describing what sounds like possible costochondritis with his reports of inhalation a pain in his ribs and at the sternum  This can also be a common feature of AS patients  I agree with him that his symptoms that he is currently experiencing are a bit atypical in that they seemed to worsen as his day progresses rather than improve, which would be more typical of inflammatory spondylitis  However it is challenging to tell if there is a probable component of activity combine with pain from chronic irreversible damage and also poor posture, as he does have signs of posture changes from the longstanding disease  We will get updated x-rays of his cervical, thoracic and lumbar regions as it has been a few years and we will be looking changes of damage from the AS  We reviewed that the MRI of his pelvis from January did show mild chronically active sacroiliitis and also probable active enthesitis at his iliopsoas insertion  He is also reporting that he no longer feels the Enbrel is beneficial as it used to be and is seeming to have symptoms consistent with costochondritis despite being on the Enbrel more consistently    We discussed that given the MRI, his symptoms, and markers of inflammation which have been persistently elevated for quite some time, we will change his therapy  We discussed that since he responded so well to a TNF inhibitor it would be ideal to stay within this class of drugs and we will try Remicade  Infusion therapy for him might be a better option because of his issues with compliance with subcutaneous injections  He was agreeable with this plan and we will send for prior authorization  In the meantime I did advise him to stay on Enbrel until we are able to arrange for the Remicade  He should space the medications 1 week apart and not start Remicade until 1 week after his last Enbrel injection  We discussed that if he declines in this transition  , then we can always try a different NSAID, as it seems like the only 1 he previously tried many years ago was Celebrex and he cannot recall any others  We will stick with the ibuprofen-famotidine for now since it is still helps him, but ideally in the long-term it would be best to get him to a point on biologic therapy where he is not requiring a daily NSAID  He was agreeable with this plan  He will get updated lab work today for drug monitoring and also get an updated TB test for the change of biologics  In the future, if we are not improving with the change of biologics and pain seems to be more related to chronic irreversible damage, then we can always consider a referral to spine and Pain Management for treatment options of mechanical pain once we have the disease under better control from an inflammatory standpoint  Plan:  Diagnoses and all orders for this visit:    Ankylosing spondylitis of multiple sites in spine ([facility] [address] )  -     XR spine lumbar minimum 4 views non injury; Future  -     XR spine cervical complete 4 or 5 vw non injury;  Future  -     XR spine thoracic 2 vw; Future    Encounter for monitoring of etanercept therapy    NSAID long-term use    Long term use of drug  -     Quantiferon TB Gold Plus  -     CBC and differential  -     C-reactive protein  - Comprehensive metabolic panel  -     Sedimentation rate, automated    Screening-pulmonary TB  -     Quantiferon TB Gold Plus        Follow-up plan: 3 months        Rheumatic Disease Summary  1  AS  Established care- August, 2017- hx of AS dx with LVH in 2006  Did not start treatment at that time although Enbrel was recommended  Then seen by [facility] in 2009 and started on Enbrel with Celebrex, Tramadol, and Flexeril; now taking Duexis  -XR lumbar/SI joints 2017: mild squaring of lumbar vertebra with enthesopathic changes in facet joint and at costotransverse level; erosive changes at B/L SI joints with some surrounding sclerosis suggestive sacroiliitis, moderate left hip narrowing   -MRI pelvis 1/16/19: mild B/L chronic sacroiliitis, no edema, effusions or definite erosions; minimal right mid-SI joint ankylosis; mild new left iliopsoas insertional and right common hamstring tendon origin, presumably active enthesitis vs strain  -Visit 9/16/19: progressive worsening mid and lower back pain, enbrel less effective, also symptoms of costochondritis; prior auth for remicade, cont enbrel until able to start remicade    2  Drug Monitoring   3  Comorbidities: h/o left labral tear s/p repair     HPI  Zoë Perez is a 34 y o   male who presents for rheumatology follow-up for longstanding ankylosing spondylitis, currently on Enbrel for the past several years  He is also on chronic ibuprofen-famotidine  Patient reports that he overall is doing about the same  He states he continues to have progressive mid and lower back pain  He reports that he stretches 1st thing in the morning before getting out of bed and then is able to get up and do his normal routine without any major limitations  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, and feels as the day progresses, that his mid and lower back pain progressively worsens    He feels like in the past, years ago, he noticed that with activity he always felt better in his lower back and with rest he would feel worse  He also has similar complaints in his neck which is progressively stiff through his day  He is unsure if this is related to his AS or is related to posture  However patient also reports that he cannot sit still" and always needs to be moving around, so it is unclear if this is because he stiffens up and needs to loosen up, but then states that he does feel progressively worse with too much activity  He admits that he has not always been completely compliant with Enbrel, mainly because the injection is unpleasant  States that since he last saw Zev Jones he was doing it more consistently every week but did not notice a significant difference in terms of his back pain  He recalls that when he initially when on the Enbrel, about 10 years ago, there is a major noticeable difference for him and he actually remained off NSAIDs completely for a while  He feels that over the years and more recently, Enbrel is not as effective and he notices no major difference from week to week after his weekly injection  He also complains of rib pain and pain with inhalation  He feels a tightness around his ribcage and a pain that occurs when he tries to take a deep breath  He does still consistently take the ibuprofen-famotidine every day and sometimes on certain days will take 2 additional ibuprofen in the evenings  He states this is a "life safer" for him and he definitely still feels the NSAIDs are effective for him  He denies any peripheral joint pain or swelling, and has no history of psoriasis or inflammatory bowel or eye disease      The following portions of the patient's history were reviewed and updated as appropriate: allergies, current medications, past family history, past medical history, past social history, past surgical history and problem list     Review of Systems:   Review of Systems   Constitutional: Negative for chills, fatigue, fever and unexpected weight change  HENT: Negative for mouth sores and trouble swallowing  Eyes: Negative for pain and visual disturbance  Respiratory: Negative for cough and shortness of breath  Cardiovascular: Negative for chest pain and leg swelling  Gastrointestinal: Negative for abdominal pain, blood in stool, constipation, diarrhea and nausea  Genitourinary: Negative for hematuria  Musculoskeletal: Positive for back pain  Negative for arthralgias, joint swelling and myalgias  Skin: Negative for rash  Neurological: Negative for weakness and numbness  Hematological: Negative for adenopathy  Psychiatric/Behavioral: Negative for sleep disturbance  Home Medications:    Current Outpatient Medications:     etanercept (ENBREL SURECLICK) 50 MG/ML injection, Inject 1 mL (50 mg total) under the skin once a week, Disp: 12 Syringe, Rfl: 3    Ibuprofen-Famotidine (DUEXIS) 800-26 6 MG TABS, Take 1 tablet by mouth daily as needed (pain), Disp: 90 tablet, Rfl: 3    methocarbamol (ROBAXIN) 500 mg tablet, Take 1 tablet (500 mg total) by mouth daily, Disp: 30 tablet, Rfl: 0    Objective:    Vitals:    09/16/19 1044   BP: 120/70   BP Location: Left arm   Patient Position: Sitting   Cuff Size: Adult   Pulse: 75   Weight: 66 kg (145 lb 6 4 oz)   Height: 6' (1 829 m)       Physical Exam   Constitutional: He appears well-developed and well-nourished  He is cooperative  HENT:   Head: Normocephalic and atraumatic  Eyes: Conjunctivae and EOM are normal  No scleral icterus  Neck: No spinous process tenderness and no muscular tenderness present  No thyromegaly present  Musculoskeletal:   Increased thoracic kyphosis and cervical flexion, loss of lumbar lordosis; limited lumbar spine flexion  No reproducible soft tissue or joint tenderness  No joint swelling or synovitis anywhere  Lymphadenopathy:     He has no cervical adenopathy  Neurological: He is alert  No sensory deficit  Skin: Skin is warm and dry   No rash noted  Nails show no clubbing  Psychiatric: He has a normal mood and affect  Imaging:   MRI pelvis 1/16/19: IMPRESSION:  Mild bilateral chronic sacroiliitis  No edema, effusion, or definitive erosions  Minimal right mid SI joint ankylosis  Mild new left iliopsoas insertional and right common hamstring tendon origin presumed active enthesitis with minimal underlying reactive osseous edema  Differential includes acute to subacute strain  No full-thickness tendon rupture  Mild bilateral hip osteoarthritis  Tiny bilateral CAM lesions compatible with chronic femoral acetabular impingement syndrome      Labs:   Component      Latest Ref Rng & Units 6/14/2019   WBC      4 31 - 10 16 Thousand/uL 9 07   Red Blood Cell Count      3 88 - 5 62 Million/uL 5 96 (H)   Hemoglobin      12 0 - 17 0 g/dL 15 7   HCT      36 5 - 49 3 % 49 7 (H)   MCV      82 - 98 fL 83   MCH      26 8 - 34 3 pg 26 3 (L)   MCHC      31 4 - 37 4 g/dL 31 6   RDW      11 6 - 15 1 % 14 4   MPV      8 9 - 12 7 fL 8 1 (L)   Platelet Count      188 - 390 Thousands/uL 344   nRBC      /100 WBCs 0   Neutrophils %      43 - 75 % 69   Immat GRANS %      0 - 2 % 0   Lymphocytes Relative      14 - 44 % 19   Monocytes Relative      4 - 12 % 8   Eosinophils      0 - 6 % 4   Basophils Relative      0 - 1 % 0   Absolute Neutrophils      1 85 - 7 62 Thousands/µL 6 23   Immature Grans Absolute      0 00 - 0 20 Thousand/uL 0 03   Lymphocytes Absolute      0 60 - 4 47 Thousands/µL 1 69   Absolute Monocytes      0 17 - 1 22 Thousand/µL 0 76   Absolute Eosinophils      0 00 - 0 61 Thousand/µL 0 32   Basophils Absolute      0 00 - 0 10 Thousands/µL 0 04   Sodium      136 - 145 mmol/L 140   Potassium      3 5 - 5 3 mmol/L 4 5   Chloride      100 - 108 mmol/L 101   CO2      21 - 32 mmol/L 31   Anion Gap      4 - 13 mmol/L 8   BUN      5 - 25 mg/dL 15   Creatinine      0 60 - 1 30 mg/dL 1 00   Glucose, Random      65 - 140 mg/dL 95   Calcium      8 3 - 10 1 mg/dL 9 8   AST      5 - 45 U/L 16   ALT      12 - 78 U/L 22   Alkaline Phosphatase      46 - 116 U/L 96   Total Protein      6 4 - 8 2 g/dL 8 5 (H)   Albumin      3 5 - 5 0 g/dL 4 0   TOTAL BILIRUBIN      0 20 - 1 00 mg/dL 0 50   eGFR      ml/min/1 73sq m 100   C-REACTIVE PROTEIN      <3 0 mg/L 19 1 (H)   Sed Rate      0 - 10 mm/hour 6

## 2019-09-18 LAB
GAMMA INTERFERON BACKGROUND BLD IA-ACNC: 0.06 IU/ML
M TB IFN-G BLD-IMP: NEGATIVE
M TB IFN-G CD4+ BCKGRND COR BLD-ACNC: -0.02 IU/ML
M TB IFN-G CD4+ BCKGRND COR BLD-ACNC: 0 IU/ML
MITOGEN IGNF BCKGRD COR BLD-ACNC: >10 IU/ML

## 2019-09-24 ENCOUNTER — OFFICE VISIT (OUTPATIENT)
Dept: FAMILY MEDICINE CLINIC | Facility: CLINIC | Age: 34
End: 2019-09-24
Payer: COMMERCIAL

## 2019-09-24 ENCOUNTER — HOSPITAL ENCOUNTER (OUTPATIENT)
Dept: RADIOLOGY | Facility: HOSPITAL | Age: 34
Discharge: HOME | End: 2019-09-24
Payer: COMMERCIAL

## 2019-09-24 VITALS
HEART RATE: 70 BPM | DIASTOLIC BLOOD PRESSURE: 60 MMHG | RESPIRATION RATE: 16 BRPM | HEIGHT: 69 IN | TEMPERATURE: 97.6 F | OXYGEN SATURATION: 99 % | WEIGHT: 144 LBS | BODY MASS INDEX: 21.33 KG/M2 | SYSTOLIC BLOOD PRESSURE: 124 MMHG

## 2019-09-24 DIAGNOSIS — Z00.00 PHYSICAL EXAM, ANNUAL: Primary | ICD-10-CM

## 2019-09-24 DIAGNOSIS — M45.0 ANKYLOSING SPONDYLITIS OF MULTIPLE SITES IN SPINE (HCC): ICD-10-CM

## 2019-09-24 DIAGNOSIS — Z13.220 SCREENING FOR LIPOID DISORDERS: ICD-10-CM

## 2019-09-24 DIAGNOSIS — M25.50 ARTHRALGIA, UNSPECIFIED JOINT: ICD-10-CM

## 2019-09-24 DIAGNOSIS — Z23 ENCOUNTER FOR IMMUNIZATION: ICD-10-CM

## 2019-09-24 PROCEDURE — 72070 X-RAY EXAM THORAC SPINE 2VWS: CPT

## 2019-09-24 PROCEDURE — 99204 OFFICE O/P NEW MOD 45 MIN: CPT | Performed by: FAMILY MEDICINE

## 2019-09-24 PROCEDURE — 72110 X-RAY EXAM L-2 SPINE 4/>VWS: CPT

## 2019-09-24 PROCEDURE — 90715 TDAP VACCINE 7 YRS/> IM: CPT

## 2019-09-24 PROCEDURE — 72050 X-RAY EXAM NECK SPINE 4/5VWS: CPT

## 2019-09-24 PROCEDURE — 90471 IMMUNIZATION ADMIN: CPT

## 2019-09-24 NOTE — PATIENT INSTRUCTIONS
Continue healthy diet and exercise  Obtain labs    Wellness Visit for Adults   AMBULATORY CARE:   A wellness visit  is when you see your healthcare provider to get screened for health problems  You can also get advice on how to stay healthy  Write down your questions so you remember to ask them  Ask your healthcare provider how often you should have a wellness visit  What happens at a wellness visit:  Your healthcare provider will ask about your health, and your family history of health problems  This includes high blood pressure, heart disease, and cancer  He or she will ask if you have symptoms that concern you, if you smoke, and about your mood  You may also be asked about your intake of medicines, supplements, food, and alcohol  Any of the following may be done:  · Your weight  will be checked  Your height may also be checked so your body mass index (BMI) can be calculated  Your BMI shows if you are at a healthy weight  · Your blood pressure  and heart rate will be checked  Your temperature may also be checked  · Blood and urine tests  may be done  Blood tests may be done to check your cholesterol levels  Abnormal cholesterol levels increase your risk for heart disease and stroke  You may also need a blood or urine test to check for diabetes if you are at increased risk  Urine tests may be done to look for signs of an infection or kidney disease  · A physical exam  includes checking your heartbeat and lungs with a stethoscope  Your healthcare provider may also check your skin to look for sun damage  · Screening tests  may be recommended  A screening test is done to check for diseases that may not cause symptoms  The screening tests you may need depend on your age, gender, family history, and lifestyle habits  For example, colorectal screening may be recommended if you are 48years old or older  Screening tests you need if you are a woman:   · A Pap smear  is used to screen for cervical cancer  Pap smears are usually done every 3 to 5 years depending on your age  You may need them more often if you have had abnormal Pap smear test results in the past  Ask your healthcare provider how often you should have a Pap smear  · A mammogram  is an x-ray of your breasts to screen for breast cancer  Experts recommend mammograms every 2 years starting at age 48 years  You may need a mammogram at age 52 years or younger if you have an increased risk for breast cancer  Talk to your healthcare provider about when you should start having mammograms and how often you need them  Vaccines you may need:   · Get an influenza vaccine  every year  The influenza vaccine protects you from the flu  Several types of viruses cause the flu  The viruses change over time, so new vaccines are made each year  · Get a tetanus-diphtheria (Td) booster vaccine  every 10 years  This vaccine protects you against tetanus and diphtheria  Tetanus is a severe infection that may cause painful muscle spasms and lockjaw  Diphtheria is a severe bacterial infection that causes a thick covering in the back of your mouth and throat  · Get a human papillomavirus (HPV) vaccine  if you are female and aged 23 to 32 or male 23 to 24 and never received it  This vaccine protects you from HPV infection  HPV is the most common infection spread by sexual contact  HPV may also cause vaginal, penile, and anal cancers  · Get a pneumococcal vaccine  if you are aged 72 years or older  The pneumococcal vaccine is an injection given to protect you from pneumococcal disease  Pneumococcal disease is an infection caused by pneumococcal bacteria  The infection may cause pneumonia, meningitis, or an ear infection  · Get a shingles vaccine  if you are aged 61 or older, even if you have had shingles before  The shingles vaccine is an injection to protect you from the varicella-zoster virus  This is the same virus that causes chickenpox   Shingles is a painful rash that develops in people who had chickenpox or have been exposed to the virus  How to eat healthy:  My Plate is a model for planning healthy meals  It shows the types and amounts of foods that should go on your plate  Fruits and vegetables make up about half of your plate, and grains and protein make up the other half  A serving of dairy is included on the side of your plate  The amount of calories and serving sizes you need depends on your age, gender, weight, and height  Examples of healthy foods are listed below:  · Eat a variety of vegetables  such as dark green, red, and orange vegetables  You can also include canned vegetables low in sodium (salt) and frozen vegetables without added butter or sauces  · Eat a variety of fresh fruits , canned fruit in 100% juice, frozen fruit, and dried fruit  · Include whole grains  At least half of the grains you eat should be whole grains  Examples include whole-wheat bread, wheat pasta, brown rice, and whole-grain cereals such as oatmeal     · Eat a variety of protein foods such as seafood (fish and shellfish), lean meat, and poultry without skin (turkey and chicken)  Examples of lean meats include pork leg, shoulder, or tenderloin, and beef round, sirloin, tenderloin, and extra lean ground beef  Other protein foods include eggs and egg substitutes, beans, peas, soy products, nuts, and seeds  · Choose low-fat dairy products such as skim or 1% milk or low-fat yogurt, cheese, and cottage cheese  · Limit unhealthy fats  such as butter, hard margarine, and shortening  Exercise:  Exercise at least 30 minutes per day on most days of the week  Some examples of exercise include walking, biking, dancing, and swimming  You can also fit in more physical activity by taking the stairs instead of the elevator or parking farther away from stores  Include muscle strengthening activities 2 days each week  Regular exercise provides many health benefits   It helps you manage your weight, and decreases your risk for type 2 diabetes, heart disease, stroke, and high blood pressure  Exercise can also help improve your mood  Ask your healthcare provider about the best exercise plan for you  General health and safety guidelines:   · Do not smoke  Nicotine and other chemicals in cigarettes and cigars can cause lung damage  Ask your healthcare provider for information if you currently smoke and need help to quit  E-cigarettes or smokeless tobacco still contain nicotine  Talk to your healthcare provider before you use these products  · Limit alcohol  A drink of alcohol is 12 ounces of beer, 5 ounces of wine, or 1½ ounces of liquor  · Lose weight, if needed  Being overweight increases your risk of certain health conditions  These include heart disease, high blood pressure, type 2 diabetes, and certain types of cancer  · Protect your skin  Do not sunbathe or use tanning beds  Use sunscreen with a SPF 15 or higher  Apply sunscreen at least 15 minutes before you go outside  Reapply sunscreen every 2 hours  Wear protective clothing, hats, and sunglasses when you are outside  · Drive safely  Always wear your seatbelt  Make sure everyone in your car wears a seatbelt  A seatbelt can save your life if you are in an accident  Do not use your cell phone when you are driving  This could distract you and cause an accident  Pull over if you need to make a call or send a text message  · Practice safe sex  Use latex condoms if are sexually active and have more than one partner  Your healthcare provider may recommend screening tests for sexually transmitted infections (STIs)  · Wear helmets, lifejackets, and protective gear  Always wear a helmet when you ride a bike or motorcycle, go skiing, or play sports that could cause a head injury  Wear protective equipment when you play sports  Wear a lifejacket when you are on a boat or doing water sports    © 2017 [employer] [facility] is for End User's use only and may not be sold, redistributed or otherwise used for commercial purposes  All illustrations and images included in CareNotes® are the copyrighted property of A D A M [employer] Inc  or Trevor Meneses  The above information is an  only  It is not intended as medical advice for individual conditions or treatments  Talk to your doctor, nurse or pharmacist before following any medical regimen to see if it is safe and effective for you

## 2019-09-24 NOTE — PROGRESS NOTES
[facility] [place]    NAME: Martha Valiente  AGE: 34 y o  SEX: male  : 1985     DATE: 2019     Assessment and Plan:     Problem List Items Addressed This Visit        Musculoskeletal and Integument    AS (ankylosing spondylitis) ([address] )      Other Visit Diagnoses     Physical exam, annual    -  Primary    recommended healthy diet and exercise  continue f/u with rheum   tdap today  pt declines flu shot  check labs as ordered    Relevant Orders    TDAP VACCINE GREATER THAN OR EQUAL TO 6YO IM (Completed)    Arthralgia, unspecified joint        pt hunts often and would like lyme panel checked      Relevant Orders    Lyme Antibody Profile with reflex to WB    Screening for lipoid disorders        Relevant Orders    Lipid panel    Encounter for immunization              Immunizations and preventive care screenings were discussed with patient today  Appropriate education was printed on patient's after visit summary  Counseling:  Alcohol/drug use: discussed moderation in alcohol intake, the recommendations for healthy alcohol use, and avoidance of illicit drug use  Dental Health: discussed importance of regular tooth brushing, flossing, and dental visits  · Exercise: the importance of regular exercise/physical activity was discussed  Recommend exercise 3-5 times per week for at least 30 minutes  Return in about 1 year (around 2020)  Chief Complaint:     Chief Complaint   Patient presents with    Physical Exam      History of Present Illness:     Adult Annual Physical   Patient here for a comprehensive physical exam  The patient reports no problems  He feels well overall  Has stiffness from Ankylosing Spondylitis and sees rheum  Is a smoker and isn't yet ready to quit  Diet and Physical Activity  · Diet/Nutrition: well balanced diet  · Exercise: no formal exercise        Depression Screening  PHQ-9 Depression Screening    PHQ-9:    Frequency of the following problems over the past two weeks:       Little interest or pleasure in doing things:  0 - not at all  Feeling down, depressed, or hopeless:  0 - not at all  PHQ-2 Score:  0       General Health  · Sleep: sleeps well  · Hearing: normal - bilateral   · Vision: no vision problems  · Dental: regular dental visits  Review of Systems:     Review of Systems   Constitutional: Negative for chills, fatigue, fever and unexpected weight change  HENT: Negative for congestion, ear pain, hearing loss, postnasal drip, rhinorrhea, sinus pressure, sinus pain, sore throat, trouble swallowing and voice change  Eyes: Negative for pain, redness and visual disturbance  Respiratory: Negative for cough and shortness of breath  Cardiovascular: Negative for chest pain, palpitations and leg swelling  Gastrointestinal: Negative for abdominal pain, constipation, diarrhea and nausea  Endocrine: Negative for cold intolerance, heat intolerance, polydipsia and polyuria  Genitourinary: Negative for dysuria, frequency and urgency  Musculoskeletal: Negative for arthralgias, joint swelling and myalgias  Skin: Negative for rash  No suspicious lesions   Neurological: Negative for weakness, numbness and headaches  Hematological: Negative for adenopathy        Past Medical History:     Past Medical History:   Diagnosis Date    [facility]ylosing sp[address]yl[address] ([facility] [address] )     Arthritis     Kidney stone       Past Surgical History:     Past Surgical History:   Procedure Laterality Date    PA HIP SCOPE/REMV BODY,PLASTY/RESECTN Left 6/1/2017    Procedure: LEFT HIP ARTHROSCOPIC LABRAL DEBRIDEMENT;  Surgeon: Boby Siddiqi MD;  [facility]: [facility];  Service: Orthopedics    WISDOM TOOTH EXTRACTION        Social History:     Social History     Socioeconomic History    Marital status: Single     Spouse name: None    Number of children: None    Years of education: None    Highest education level: None   Occupational History    None   Social Needs    Financial resource strain: None    Food insecurity:     Worry: None     Inability: None    Transportation needs:     Medical: None     Non-medical: None   Tobacco Use    Smoking status: Current Every Day Smoker     Packs/day: 1 00     Years: 15 00     Pack years: 15 00     Types: Cigarettes    Smokeless tobacco: Never Used   Substance and Sexual Activity    Alcohol use: No    Drug use: No    Sexual activity: None   Lifestyle    Physical activity:     Days per week: None     Minutes per session: None    Stress: None   Relationships    Social connections:     Talks on phone: None     Gets together: None     Attends Orthodoxy service: None     Active member of club or organization: None     Attends meetings of clubs or organizations: None     Relationship status: None    Intimate partner violence:     Fear of current or ex partner: None     Emotionally abused: None     Physically abused: None     Forced sexual activity: None   Other Topics Concern    None   Social History Narrative    Caffeine use    Current every day smoker: 1/2 ppd x 9 years     Employed     No kids    Single     for [employer]      Family History:     Family History   Problem Relation Age of Onset    No Known Problems Mother     No Known Problems Father     No Known Problems Maternal Grandmother     No Known Problems Maternal Grandfather     No Known Problems Paternal Grandmother     Skin cancer Paternal Grandfather       Current Medications:     Current Outpatient Medications   Medication Sig Dispense Refill    etanercept (ENBREL SURECLICK) 50 MG/ML injection Inject 1 mL (50 mg total) under the skin once a week 12 Syringe 3    Ibuprofen-Famotidine (DUEXIS) 800-26 6 MG TABS Take 1 tablet by mouth daily as needed (pain) 90 tablet 3    methocarbamol (ROBAXIN) 500 mg tablet Take 1 tablet (500 mg total) by mouth daily 30 tablet 0 No current facility-administered medications for this visit  Allergies:     No Known Allergies   Physical Exam:     /60 (BP Location: Left arm)   Pulse 70   Temp 97 6 °F (36 4 °C)   Resp 16   Ht 5' 8 5" (1 74 m)   Wt 65 3 kg (144 lb)   SpO2 99%   BMI 21 57 kg/m²     Physical Exam   Constitutional: He is oriented to person, place, and time  He appears well-developed and well-nourished  No distress  HENT:   Head: Normocephalic and atraumatic  Right Ear: Tympanic membrane, external ear and ear canal normal    Left Ear: Tympanic membrane, external ear and ear canal normal    Nose: Nose normal    Mouth/Throat: Oropharynx is clear and moist and mucous membranes are normal  No oropharyngeal exudate  Eyes: Pupils are equal, round, and reactive to light  Conjunctivae and EOM are normal    Neck: No JVD present  Carotid bruit is not present  No thyromegaly present  Cardiovascular: Regular rhythm, S1 normal and S2 normal  Exam reveals no gallop, no S3, no S4 and no friction rub  No murmur heard  Pulmonary/Chest: Effort normal and breath sounds normal  He has no wheezes  He has no rhonchi  He has no rales  Abdominal: Soft  Bowel sounds are normal  He exhibits no distension  There is no tenderness  Lymphadenopathy:     He has no cervical adenopathy  Neurological: He is alert and oriented to person, place, and time  He has normal strength and normal reflexes  No cranial nerve deficit or sensory deficit         Lela Urias DO   [facility]

## 2019-09-24 NOTE — TELEPHONE ENCOUNTER
Received fax stating that it looks like it may be denied  Provider can call [phone] for peer to peer       Record #: [id number]

## 2019-09-25 NOTE — TELEPHONE ENCOUNTER
Spoke with insurance, the number below was just to set up the [id number], not to actually do the [id number]  They sent in request for [id number] and I gave my number for callback for actual [id number]

## 2019-09-26 NOTE — TELEPHONE ENCOUNTER
Just FYI for this "peer to peer"  They will not process it I do not believe       Please send back to me

## 2019-09-27 ENCOUNTER — TELEPHONE (OUTPATIENT)
Dept: OBGYN CLINIC | Facility: CLINIC | Age: 34
End: 2019-09-27

## 2019-09-27 DIAGNOSIS — Z79.899 ENCOUNTER FOR MONITORING OF ETANERCEPT THERAPY: ICD-10-CM

## 2019-09-27 DIAGNOSIS — Z51.81 ENCOUNTER FOR MONITORING OF ETANERCEPT THERAPY: ICD-10-CM

## 2019-09-27 DIAGNOSIS — M45.0 ANKYLOSING SPONDYLITIS OF MULTIPLE SITES IN SPINE (HCC): ICD-10-CM

## 2019-09-27 RX ORDER — METHOCARBAMOL 500 MG/1
500 TABLET, FILM COATED ORAL DAILY
Qty: 30 TABLET | Refills: 2 | Status: SHIPPED | OUTPATIENT
Start: 2019-09-27 | End: 2021-04-15 | Stop reason: ALTCHOICE

## 2019-09-27 NOTE — TELEPHONE ENCOUNTER
Pt contacted Call Center requested refill of their medication  Medication Name: Methocarbamol     Dosage of Med: 500 mg      Frequency of Med: 1 x day      Remaining Medication: 0      Pharmacy and Location:  [address] [place] [address]        Pt  Preferred Callback Phone Number: [phone]      Thank you

## 2019-09-27 NOTE — TELEPHONE ENCOUNTER
The [facility] and spoke with Leny Maria  She stated upon looking up the members insurance plan and the Laura Marlow, this is handled by [employer]  I did advise her I submitted it that way and they told me they do not handle it   She stated it cannot be processed through [address] [place] [address] and I need to call [employer] at [phone]

## 2019-09-27 NOTE — TELEPHONE ENCOUNTER
Called [employer] and spoke with Dennis Law   She is going to be faxing the form to my attention here at

## 2019-10-03 PROBLEM — M45.9 AS (ANKYLOSING SPONDYLITIS) (HCC): Chronic | Status: ACTIVE | Noted: 2017-07-21

## 2019-10-03 PROBLEM — M45.0 ANKYLOSING SPONDYLITIS OF MULTIPLE SITES IN SPINE (HCC): Status: ACTIVE | Noted: 2019-10-03

## 2019-10-03 RX ORDER — SODIUM CHLORIDE 9 MG/ML
20 INJECTION, SOLUTION INTRAVENOUS ONCE
Status: CANCELLED | OUTPATIENT
Start: 2019-10-10

## 2019-10-03 RX ORDER — DIPHENHYDRAMINE HCL 25 MG
25 TABLET ORAL ONCE
Status: CANCELLED | OUTPATIENT
Start: 2019-10-10

## 2019-10-03 RX ORDER — METHYLPREDNISOLONE SODIUM SUCCINATE 40 MG/ML
40 INJECTION, POWDER, LYOPHILIZED, FOR SOLUTION INTRAMUSCULAR; INTRAVENOUS ONCE
Status: CANCELLED | OUTPATIENT
Start: 2019-10-10

## 2019-10-03 RX ORDER — ACETAMINOPHEN 325 MG/1
650 TABLET ORAL ONCE
Status: CANCELLED | OUTPATIENT
Start: 2019-10-10

## 2019-10-03 NOTE — TELEPHONE ENCOUNTER
Therapy plan for remicade placed in chart, please arrange for infusion, placed orders for [facility] [place]  When you talk to patient remind him to just make sure he is off the enbrel for at least 1 week before he gets his 1st remicade

## 2019-10-08 ENCOUNTER — HOSPITAL ENCOUNTER (OUTPATIENT)
Dept: INFUSION CENTER | Facility: CLINIC | Age: 34
Discharge: HOME | End: 2019-10-08

## 2019-10-08 ENCOUNTER — OFFICE VISIT (OUTPATIENT)
Dept: FAMILY MEDICINE CLINIC | Facility: CLINIC | Age: 34
End: 2019-10-08
Payer: COMMERCIAL

## 2019-10-08 VITALS
TEMPERATURE: 98.4 F | DIASTOLIC BLOOD PRESSURE: 70 MMHG | SYSTOLIC BLOOD PRESSURE: 120 MMHG | RESPIRATION RATE: 14 BRPM | HEART RATE: 80 BPM | OXYGEN SATURATION: 100 %

## 2019-10-08 VITALS
RESPIRATION RATE: 16 BRPM | DIASTOLIC BLOOD PRESSURE: 68 MMHG | BODY MASS INDEX: 21.56 KG/M2 | TEMPERATURE: 98.6 F | SYSTOLIC BLOOD PRESSURE: 118 MMHG | HEIGHT: 69 IN | OXYGEN SATURATION: 98 % | HEART RATE: 78 BPM | WEIGHT: 145.6 LBS

## 2019-10-08 DIAGNOSIS — M26.621 ARTHRALGIA OF RIGHT TEMPOROMANDIBULAR JOINT: Primary | ICD-10-CM

## 2019-10-08 DIAGNOSIS — M45.0 ANKYLOSING SPONDYLITIS OF MULTIPLE SITES IN SPINE (HCC): ICD-10-CM

## 2019-10-08 PROCEDURE — 3008F BODY MASS INDEX DOCD: CPT | Performed by: FAMILY MEDICINE

## 2019-10-08 PROCEDURE — 99213 OFFICE O/P EST LOW 20 MIN: CPT | Performed by: FAMILY MEDICINE

## 2019-10-08 NOTE — PROGRESS NOTES
Assessment/Plan:       Problem List Items Addressed This Visit     None      Visit Diagnoses     Arthralgia of right temporomandibular joint    -  Primary    advised duexis twice daily x 1 week, robaxin prn, and call if symptoms don't improve in a week  pt to see his dentist in a few days--advised to discuss symptoms              Subjective:     Lynda Candelario is a 34 y o  male here today for 4 days of R ear pain  Worse when opening mouth  No fevers  No cough or congestion  Takes duexis once daily for ankylosing spondylitis and notes it gets better when he takes that, but pain always comes back  Starts and jaw and radiates to ear  No hearing loss  Isn't sure if he clenches or grinds at night  Patient Active Problem List   Diagnosis    AS (ankylosing spondylitis) (HCC)    Primary localized osteoarthritis of left hip    NSAID long-term use    Encounter for monitoring of etanercept therapy    Ankylosing spondylitis of multiple sites in spine [facility])       Current Outpatient Medications   Medication Sig Dispense Refill    etanercept (ENBREL SURECLICK) 50 MG/ML injection Inject 1 mL (50 mg total) under the skin once a week 12 Syringe 3    Ibuprofen-Famotidine (DUEXIS) 800-26 6 MG TABS Take 1 tablet by mouth daily as needed (pain) 90 tablet 3    methocarbamol (ROBAXIN) 500 mg tablet Take 1 tablet (500 mg total) by mouth daily 30 tablet 2     No current facility-administered medications for this visit  HPI:  Chief Complaint   Patient presents with    Earache     right ear pain, has been going for 5 days  has pain when he opens and closes his mouth  feels popping in ear        --above per clinical staff and reviewed             The following portions of the patient's history were reviewed and updated as appropriate: allergies, current medications, past family history, past medical history, past social history, past surgical history and problem list     Review of Systems   see HPI; all other systems negative      Objective:    /68   Pulse 78   Temp 98 6 °F (37 °C) (Tympanic)   Resp 16   Ht 5' 8 5" (1 74 m)   Wt 66 kg (145 lb 9 6 oz)   SpO2 98%   BMI 21 81 kg/m²   Wt Readings from Last 3 Encounters:   10/08/19 66 kg (145 lb 9 6 oz)   09/24/19 65 3 kg (144 lb)   09/16/19 66 kg (145 lb 6 4 oz)     BP Readings from Last 3 Encounters:   10/08/19 118/68   10/08/19 120/70   09/24/19 124/60       Physical Exam   Constitutional: He appears well-developed and well-nourished  HENT:   Head: Normocephalic  Right Ear: Hearing, tympanic membrane, external ear and ear canal normal    Left Ear: Hearing, tympanic membrane, external ear and ear canal normal    Nose: Nose normal    Mouth/Throat: Oropharynx is clear and moist    Pain to palpation R TMJ   Cardiovascular: Normal rate and regular rhythm  Exam reveals no gallop and no friction rub  No murmur heard    Pulmonary/Chest: Effort normal and breath sounds normal

## 2019-10-08 NOTE — PATIENT INSTRUCTIONS
TMJ  duexis twice daily x the next week, then back to once  May use robaxin as needed  Talk to dentist about this issue

## 2019-10-08 NOTE — PROGRESS NOTES
Pt  Verbalizes ear pain and nasal congestion on arrival   First Remicade ordered for infusion today  Pt  Verbalizes stopping Embrel a week ago on Sunday (9/29/19)  RN notified office of Dr Sharmin Miramontes [facility], will hold and reschedule treatment  Noted on schedule that Thursday 10/11/19 potentially available to reschedule  Pt  Planning to see PCP and follow up with Rheumatology to reschedule  Pt  Will also discuss that Annabelle Mondragon currently on hold  Instructed Pt  To call Rheumatology after seeing PCP

## 2019-10-10 ENCOUNTER — HOSPITAL ENCOUNTER (OUTPATIENT)
Dept: INFUSION CENTER | Facility: CLINIC | Age: 34
Discharge: HOME | End: 2019-10-10
Payer: COMMERCIAL

## 2019-10-10 ENCOUNTER — TELEPHONE (OUTPATIENT)
Dept: RHEUMATOLOGY | Facility: CLINIC | Age: 34
End: 2019-10-10

## 2019-10-10 VITALS
BODY MASS INDEX: 22.4 KG/M2 | TEMPERATURE: 97.9 F | RESPIRATION RATE: 18 BRPM | DIASTOLIC BLOOD PRESSURE: 78 MMHG | WEIGHT: 149.5 LBS | SYSTOLIC BLOOD PRESSURE: 136 MMHG | HEART RATE: 76 BPM | OXYGEN SATURATION: 100 %

## 2019-10-10 DIAGNOSIS — M45.0 ANKYLOSING SPONDYLITIS OF MULTIPLE SITES IN SPINE (HCC): Primary | ICD-10-CM

## 2019-10-10 PROCEDURE — 96361 HYDRATE IV INFUSION ADD-ON: CPT

## 2019-10-10 PROCEDURE — 96409 CHEMO IV PUSH SNGL DRUG: CPT

## 2019-10-10 PROCEDURE — 96375 TX/PRO/DX INJ NEW DRUG ADDON: CPT

## 2019-10-10 RX ORDER — SODIUM CHLORIDE 9 MG/ML
20 INJECTION, SOLUTION INTRAVENOUS ONCE
Status: COMPLETED | OUTPATIENT
Start: 2019-10-10 | End: 2019-10-10

## 2019-10-10 RX ORDER — METHYLPREDNISOLONE SODIUM SUCCINATE 40 MG/ML
40 INJECTION, POWDER, LYOPHILIZED, FOR SOLUTION INTRAMUSCULAR; INTRAVENOUS ONCE
Status: COMPLETED | OUTPATIENT
Start: 2019-10-10 | End: 2019-10-10

## 2019-10-10 RX ORDER — ACETAMINOPHEN 325 MG/1
650 TABLET ORAL ONCE
Status: CANCELLED | OUTPATIENT
Start: 2019-10-22

## 2019-10-10 RX ORDER — METHYLPREDNISOLONE SODIUM SUCCINATE 40 MG/ML
40 INJECTION, POWDER, LYOPHILIZED, FOR SOLUTION INTRAMUSCULAR; INTRAVENOUS ONCE
Status: CANCELLED | OUTPATIENT
Start: 2019-10-22

## 2019-10-10 RX ORDER — RANITIDINE 25 MG/ML
50 INJECTION, SOLUTION INTRAMUSCULAR; INTRAVENOUS ONCE
Status: DISCONTINUED | OUTPATIENT
Start: 2019-10-10 | End: 2019-10-13 | Stop reason: HOSPADM

## 2019-10-10 RX ORDER — DIPHENHYDRAMINE HCL 25 MG
25 TABLET ORAL ONCE
Status: CANCELLED | OUTPATIENT
Start: 2019-10-22

## 2019-10-10 RX ORDER — SODIUM CHLORIDE 9 MG/ML
20 INJECTION, SOLUTION INTRAVENOUS ONCE
Status: CANCELLED | OUTPATIENT
Start: 2019-10-22

## 2019-10-10 RX ORDER — DIPHENHYDRAMINE HCL 25 MG
25 TABLET ORAL ONCE
Status: COMPLETED | OUTPATIENT
Start: 2019-10-10 | End: 2019-10-10

## 2019-10-10 RX ORDER — DIPHENHYDRAMINE HYDROCHLORIDE 50 MG/ML
25 INJECTION INTRAMUSCULAR; INTRAVENOUS
Status: ACTIVE | OUTPATIENT
Start: 2019-10-10 | End: 2019-10-10

## 2019-10-10 RX ORDER — ACETAMINOPHEN 325 MG/1
650 TABLET ORAL ONCE
Status: COMPLETED | OUTPATIENT
Start: 2019-10-10 | End: 2019-10-10

## 2019-10-10 RX ADMIN — HYDROCORTISONE SODIUM SUCCINATE 50 MG: 100 INJECTION, POWDER, FOR SOLUTION INTRAMUSCULAR; INTRAVENOUS at 09:53

## 2019-10-10 RX ADMIN — METHYLPREDNISOLONE SODIUM SUCCINATE 40 MG: 40 INJECTION, POWDER, FOR SOLUTION INTRAMUSCULAR; INTRAVENOUS at 08:40

## 2019-10-10 RX ADMIN — SODIUM CHLORIDE 20 ML/HR: 0.9 INJECTION, SOLUTION INTRAVENOUS at 08:20

## 2019-10-10 RX ADMIN — ACETAMINOPHEN 650 MG: 325 TABLET, FILM COATED ORAL at 08:40

## 2019-10-10 RX ADMIN — DIPHENHYDRAMINE HYDROCHLORIDE 25 MG: 50 INJECTION, SOLUTION INTRAMUSCULAR; INTRAVENOUS at 09:54

## 2019-10-10 RX ADMIN — INFLIXIMAB 327 MG: 100 INJECTION, POWDER, LYOPHILIZED, FOR SOLUTION INTRAVENOUS at 09:30

## 2019-10-10 RX ADMIN — SODIUM CHLORIDE 500 ML: 0.9 INJECTION, SOLUTION INTRAVENOUS at 09:50

## 2019-10-10 RX ADMIN — DIPHENHYDRAMINE HCL 25 MG: 25 TABLET ORAL at 08:40

## 2019-10-10 NOTE — PROGRESS NOTES
Spoke to patient regarding re-starting Remicade Infusion: Patient does not want to resume: Would rather talk to MD and switch to another medication

## 2019-10-10 NOTE — PROGRESS NOTES
Right FA PIV discontinued: Patient will notify unit if further appt's are needed: AVS given per patient request

## 2019-10-10 NOTE — PROGRESS NOTES
Patient to [facility] for Remicade: Offers no complaints at present time: Denies any recent illness / infection: Lab work ( 09/16/19 ) reviewed:  Within parameters to treat: Right FA PIV initiated without incident

## 2019-10-10 NOTE — PROGRESS NOTES
Dr Anand Davenport office notified of the above reaction: Spoke to [address] [place] [address] RN: Restart Remicade infusion at slower rate per MD order: Increase every 30 minutes if tolerated:

## 2019-10-10 NOTE — PROGRESS NOTES
Pt called out to state that he feels 'warm all over ' Vitals checked and stable, but bp is slightly elevated  Pt has no other complaints of SOB, chest pain, hives, pain  BP rechecked and back to baseline  Remicade restarted with RN at the chairside  Drug running for about 5 minutes during which pt repeatedly reported feeling fine but when pt informed that the drug had been running he states that he was starting to once again feel 'hot all over ' Hypersensitivity protocol initiated; Call put in to the doc by Andrew Blackburn RN

## 2019-10-10 NOTE — PROGRESS NOTES
Dr Philippe Due office notified of above situation: Patient does not want to resume Remicade Infusion: Spoke with [facility] RN: Office made aware that patient will be calling

## 2019-10-10 NOTE — TELEPHONE ENCOUNTER
Noam García from infusion center called and stated when she started this patients remicade, he got hot and flushed  She stopped the medication and followed hypersensitivity protocol  I advised her to restart the medication at a slower titration (per Brenda Huntley) and she just called me back and stated the patient did not want to restart and wanted the IV out and to go home  She advised that he will be calling us later to discuss   Thanks

## 2019-10-11 NOTE — TELEPHONE ENCOUNTER
Patient is calling back wanting to talk to the doctor about the issues he had with remicade  He wants to stick with the enbrel

## 2019-10-14 NOTE — TELEPHONE ENCOUNTER
As we had discussed int he office, my recommendation would be to go with humira since he has been on enbrel so long and it likely has run its course and become less effective for him  Earnest Chela is very similar to enbrel and remicade, same class of drugs, but is also a self-injection done every other week  If he really wants to stick with enbrel that;s his decision, but I recommend trying the humira

## 2019-10-16 ENCOUNTER — TELEPHONE (OUTPATIENT)
Dept: RHEUMATOLOGY | Facility: CLINIC | Age: 34
End: 2019-10-16

## 2019-10-16 NOTE — TELEPHONE ENCOUNTER
Patient called back, he has decided to continue with the Enbrel, he said he will discuss with you at his next appointment    Thank  you

## 2019-11-06 DIAGNOSIS — M45.0 ANKYLOSING SPONDYLITIS OF MULTIPLE SITES IN SPINE (HCC): Primary | ICD-10-CM

## 2019-11-07 RX ORDER — ETANERCEPT 50 MG/ML
SOLUTION SUBCUTANEOUS
Qty: 12 ML | Refills: 4 | Status: SHIPPED | OUTPATIENT
Start: 2019-11-07 | End: 2020-11-18

## 2019-12-19 ENCOUNTER — TELEPHONE (OUTPATIENT)
Dept: OBGYN CLINIC | Facility: HOSPITAL | Age: 34
End: 2019-12-19

## 2019-12-19 DIAGNOSIS — M45.0 ANKYLOSING SPONDYLITIS OF MULTIPLE SITES IN SPINE (HCC): ICD-10-CM

## 2019-12-19 NOTE — TELEPHONE ENCOUNTER
Patient of Dr Ajit Canseco    Patient called for refill on     ENBREL 50 MG/ML SOSY [[id number]    INJECT 50 MG (1 ML) UNDER THE SKIN ONCE A WEEK (SATURDAY OR SUNDAY)    Pharmacy:  [facility], [address], [place], [place] Phone:  [phone] Fax:  [phone]    Address:  [facility], [address] [place] [id number] [address] #:  --     Pharmacy Comments:  --

## 2020-01-17 ENCOUNTER — OFFICE VISIT (OUTPATIENT)
Dept: RHEUMATOLOGY | Facility: CLINIC | Age: 35
End: 2020-01-17
Payer: COMMERCIAL

## 2020-01-17 VITALS
SYSTOLIC BLOOD PRESSURE: 130 MMHG | BODY MASS INDEX: 21.37 KG/M2 | HEIGHT: 68 IN | HEART RATE: 68 BPM | WEIGHT: 141 LBS | DIASTOLIC BLOOD PRESSURE: 90 MMHG

## 2020-01-17 DIAGNOSIS — M54.50 CHRONIC BILATERAL LOW BACK PAIN WITHOUT SCIATICA: ICD-10-CM

## 2020-01-17 DIAGNOSIS — M45.0 ANKYLOSING SPONDYLITIS OF MULTIPLE SITES IN SPINE (HCC): Primary | ICD-10-CM

## 2020-01-17 DIAGNOSIS — G89.29 CHRONIC BILATERAL LOW BACK PAIN WITHOUT SCIATICA: ICD-10-CM

## 2020-01-17 DIAGNOSIS — Z79.1 NSAID LONG-TERM USE: ICD-10-CM

## 2020-01-17 DIAGNOSIS — Z79.899 LONG TERM USE OF DRUG: ICD-10-CM

## 2020-01-17 PROCEDURE — 99214 OFFICE O/P EST MOD 30 MIN: CPT | Performed by: INTERNAL MEDICINE

## 2020-01-17 PROCEDURE — 3008F BODY MASS INDEX DOCD: CPT | Performed by: INTERNAL MEDICINE

## 2020-01-17 RX ORDER — DICLOFENAC SODIUM 75 MG/1
75 TABLET, DELAYED RELEASE ORAL 2 TIMES DAILY
Qty: 60 TABLET | Refills: 3 | Status: SHIPPED | OUTPATIENT
Start: 2020-01-17 | End: 2021-10-14 | Stop reason: ALTCHOICE

## 2020-01-17 NOTE — PROGRESS NOTES
Assessment and Plan:   Patient is a 42-year-old male with ankylosing spondylitis who presents for rheumatology follow-up  He remains on Enbrel which she has been on for over 10 years  Last visit given the increased back stiffness we decided to try Remicade, however he had some sort burning sensation each time they tried to run the infusion and so he did not want to proceed further with this  Today he reports he is doing better than last visit on the Enbrel and would like to just stay with Enbrel for now  He is describing back pain that progresses through his day rather than any morning typical inflammatory symptoms  We discussed use possible the is related to prior damage as he does have evidence of some fusion on x-ray in the lumbar spine  I did refer him to physical therapy for this  We also decided to switch is ibuprofen to diclofenac to see if he gets a better response  He will add Pepcid if needed since he was previously taking the Pepcid with ibuprofen  If the diclofenac is not effective he will go back to the ibuprofen-famotidine  We will follow-up in about 4 months  Plan:  Diagnoses and all orders for this visit:    Ankylosing spondylitis of multiple sites in spine [facility])  -     Ambulatory referral to Physical Therapy; Future  -     diclofenac (VOLTAREN) 75 mg EC tablet; Take 1 tablet (75 mg total) by mouth 2 (two) times a day    NSAID long-term use    Long term use of drug    Chronic bilateral low back pain without sciatica  -     Ambulatory referral to Physical Therapy; Future        Follow-up plan: 4 months        Rheumatic Disease Summary  1  Ankylosing spondylitis   Established care- August, 2017- hx of AS dx with LVH in 2006   Did not start treatment at that time although Enbrel was recommended   Then seen by [facility] in 2009 and started on Enbrel with Celebrex, Tramadol, and Flexeril; now taking Duexis  -XR lumbar/SI joints 2017: mild squaring of lumbar vertebra with enthesopathic changes in facet joint and at costotransverse level; erosive changes at B/L SI joints with some surrounding sclerosis suggestive sacroiliitis, moderate left hip narrowing   -MRI pelvis 1/16/19: mild B/L chronic sacroiliitis, no edema, effusions or definite erosions; minimal right mid-SI joint ankylosis; mild new left iliopsoas insertional and right common hamstring tendon origin, presumably active enthesitis vs strain  -Visit 9/16/19: progressive worsening mid and lower back pain, enbrel less effective, also symptoms of costochondritis; prior auth for remicade, cont enbrel until able to start remicade   -XR cervical/thoracic/lumbar 9/2019: facet fusion in lumbar, normal cervical/thoracic   -Started 1st remicade infusion 10/10/19 but got flushed (most likely from steroids given), but patient did not want to proceed and wanted to continue with Enbrel   -Visit 1/17/20: still back pain but sounds non-inflammatory; referred to PT, wants to stay with Enbrel, switched Duexis to Diclofenac   2  Drug Monitoring                                                3  Comorbidities: h/o left labral tear s/p repair     HPI  Chana Iliana is a 30 y o   male who presents for rheumatology follow-up for ankylosing spondylitis, currently on Enbrel  At last visit, given the ongoing back stiffness we planned to switch to Remicade since he has been on Enbrel for over 10 years now  He reports that when they started the Remicade infusion he got a burning sensation in his arm and chest with tightness and so the infusion was held  They then turned it back on and he had the same symptoms and so he did not want to proceed further  We discussed is unclear what this was but he does not want to retry the Remicade  He did have the premedications with the Solu-Medrol and Benadryl before this and did not have any issues with that  Since then he has been back on his Enbrel and he reports he is doing better than last visit    He continues to have lower back pain but reports there is no morning stiffness and he actually feels progressively worse as the day goes  He does feel this is unusual for his AS  He is still on ibuprofen-famotidine and feels this does help but is wondering if he would feel better with another NSAID  He is only using the methocarbamol once per week  He is interested in going back to physical therapy for the back pain  We did review the x-rays he had done last visit which showed normal cervical and thoracic spines, and some progression infusion in his lumbar spine compared to a study done 10 years ago  The following portions of the patient's history were reviewed and updated as appropriate: allergies, current medications, past family history, past medical history, past social history, past surgical history and problem list     Review of Systems:   Review of Systems   Constitutional: Negative for chills, fatigue, fever and unexpected weight change  HENT: Negative for mouth sores and trouble swallowing  Eyes: Negative for pain and visual disturbance  Respiratory: Negative for cough and shortness of breath  Cardiovascular: Negative for chest pain and leg swelling  Gastrointestinal: Negative for abdominal pain, blood in stool, constipation, diarrhea and nausea  Genitourinary: Negative for hematuria  Musculoskeletal: Positive for back pain  Negative for arthralgias, joint swelling and myalgias  Skin: Negative for rash  Neurological: Negative for weakness and numbness  Hematological: Negative for adenopathy  Psychiatric/Behavioral: Negative for sleep disturbance         Home Medications:    Current Outpatient Medications:     ENBREL 50 MG/ML SOSY, INJECT 50 MG (1 ML) UNDER THE SKIN ONCE A WEEK (SATURDAY OR SUNDAY), Disp: 12 mL, Rfl: 4    etanercept (ENBREL SURECLICK) 50 MG/ML injection, Inject 1 mL (50 mg total) under the skin once a week, Disp: 12 Syringe, Rfl: 1    Ibuprofen-Famotidine (DUEXIS) 800-26 6 MG TABS, Take 1 tablet by mouth daily as needed (pain), Disp: 90 tablet, Rfl: 3    methocarbamol (ROBAXIN) 500 mg tablet, Take 1 tablet (500 mg total) by mouth daily, Disp: 30 tablet, Rfl: 2    diclofenac (VOLTAREN) 75 mg EC tablet, Take 1 tablet (75 mg total) by mouth 2 (two) times a day, Disp: 60 tablet, Rfl: 3    Objective:    Vitals:    01/17/20 1028   BP: 130/90   Pulse: 68   Weight: 64 kg (141 lb)   Height: 5' 8" (1 727 m)       Physical Exam   Constitutional: He appears well-developed and well-nourished  He is cooperative  HENT:   Head: Normocephalic and atraumatic  Eyes: Conjunctivae and EOM are normal  No scleral icterus  Neck: No spinous process tenderness and no muscular tenderness present  No thyromegaly present  Musculoskeletal:   Increased thoracic kyphosis and cervical flexion, loss of lumbar lordosis; limited lumbar spine flexion  No reproducible soft tissue or joint tenderness  No joint swelling or synovitis anywhere  Lymphadenopathy:     He has no cervical adenopathy  Neurological: He is alert  No sensory deficit  Skin: Skin is warm and dry  No rash noted  Nails show no clubbing  Psychiatric: He has a normal mood and affect  Imaging:   XR lumbar 9/2019: There appears to be facet fusion, similar to 2017, not present in 2008, consistent with ankylosis spondylitis  There is mild disc space narrowing at L4-5 and L5-S1 which has progressed from the previous study  The pedicles appear intact  Soft tissues are unremarkable  IMPRESSION:  Facet joint fusion compatible with ankylosing spondylitis with progressive loss of disc height in the lower lumbar spine      XR cervical and thoracic:  Unremarkable     Labs:   Component      Latest Ref Rng & Units 9/16/2019   WBC      4 31 - 10 16 Thousand/uL 8 56   Red Blood Cell Count      3 88 - 5 62 Million/uL 5 60   Hemoglobin      12 0 - 17 0 g/dL 15 1   HCT      36 5 - 49 3 % 47 3   MCV      82 - 98 fL 85   MCH      26 8 - 34 3 pg 27 0 MCHC      31 4 - 37 4 g/dL 31 9   RDW      11 6 - 15 1 % 13 6   MPV      8 9 - 12 7 fL 8 5 (L)   Platelet Count      514 - 390 Thousands/uL 333   nRBC      /100 WBCs 0   Neutrophils %      43 - 75 % 64   Immat GRANS %      0 - 2 % 0   Lymphocytes Relative      14 - 44 % 23   Monocytes Relative      4 - 12 % 10   Eosinophils      0 - 6 % 3   Basophils Relative      0 - 1 % 0   Absolute Neutrophils      1 85 - 7 62 Thousands/µL 5 49   Immature Grans Absolute      0 00 - 0 20 Thousand/uL 0 02   Lymphocytes Absolute      0 60 - 4 47 Thousands/µL 1 95   Absolute Monocytes      0 17 - 1 22 Thousand/µL 0 83   Absolute Eosinophils      0 00 - 0 61 Thousand/µL 0 24   Basophils Absolute      0 00 - 0 10 Thousands/µL 0 03   Sodium      136 - 145 mmol/L 140   Potassium      3 5 - 5 3 mmol/L 4 0   Chloride      100 - 108 mmol/L 103   CO2      21 - 32 mmol/L 28   Anion Gap      4 - 13 mmol/L 9   BUN      5 - 25 mg/dL 15   Creatinine      0 60 - 1 30 mg/dL 0 84   Glucose, Random      65 - 140 mg/dL 89   Calcium      8 3 - 10 1 mg/dL 8 9   AST      5 - 45 U/L 17   ALT      12 - 78 U/L 18   Alkaline Phosphatase      46 - 116 U/L 84   Total Protein      6 4 - 8 2 g/dL 8 0   Albumin      3 5 - 5 0 g/dL 3 8   TOTAL BILIRUBIN      0 20 - 1 00 mg/dL 0 60   eGFR      ml/min/1 73sq m 117   C-REACTIVE PROTEIN      <3 0 mg/L 20 2 (H)   Sed Rate      0 - 10 mm/hour 9

## 2020-02-13 ENCOUNTER — TELEPHONE (OUTPATIENT)
Dept: OBGYN CLINIC | Facility: HOSPITAL | Age: 35
End: 2020-02-13

## 2020-02-13 NOTE — TELEPHONE ENCOUNTER
Patient sees Dr Abelino Aquino from [address] is inquiring about the [facility] script, is 1 tablet a day the correct dosage? Previously it was 3 x a day as needed      Mir cb # is [phone]

## 2020-02-13 NOTE — TELEPHONE ENCOUNTER
Spoke with Ken Medina  She stated on her end it was showing Duexis TID and I told her its only 1 tab daily  But I did informa her he was prescribed Diclofenac BID instead of the Duexis       She stated patient requested a refill of the Duexis for prn

## 2020-02-13 NOTE — TELEPHONE ENCOUNTER
It was last filled in august by Aaron Raman for 1 tablet daily as needed, and no recent refill shave been sent in otherwise, so I am not sure what they are referring to  I recently sent in diclofenac to take BID as needed instead of the duexis, but if it didn't help patient was going to go back to duexis  Can you please clarify with them what they issue is?

## 2020-02-13 NOTE — TELEPHONE ENCOUNTER
Frederick Juarez is not at pharmacy at this time, they are sending her a message to return the phone call

## 2020-04-29 ENCOUNTER — TELEPHONE (OUTPATIENT)
Dept: RHEUMATOLOGY | Facility: CLINIC | Age: 35
End: 2020-04-29

## 2020-06-08 ENCOUNTER — TELEPHONE (OUTPATIENT)
Dept: OBGYN CLINIC | Facility: HOSPITAL | Age: 35
End: 2020-06-08

## 2020-06-08 DIAGNOSIS — M45.0 ANKYLOSING SPONDYLITIS OF MULTIPLE SITES IN SPINE (HCC): ICD-10-CM

## 2020-06-08 RX ORDER — IBUPROFEN AND FAMOTIDINE 26.6; 8 MG/1; MG/1
1 TABLET, FILM COATED ORAL DAILY PRN
Qty: 90 TABLET | Refills: 0 | Status: SHIPPED | OUTPATIENT
Start: 2020-06-08 | End: 2020-09-15 | Stop reason: SDUPTHER

## 2020-09-15 ENCOUNTER — TELEPHONE (OUTPATIENT)
Dept: OBGYN CLINIC | Facility: HOSPITAL | Age: 35
End: 2020-09-15

## 2020-09-15 DIAGNOSIS — M45.0 ANKYLOSING SPONDYLITIS OF MULTIPLE SITES IN SPINE (HCC): ICD-10-CM

## 2020-09-15 RX ORDER — IBUPROFEN AND FAMOTIDINE 800; 26.6 MG/1; MG/1
1 TABLET, COATED ORAL DAILY PRN
Qty: 90 TABLET | Refills: 0 | Status: SHIPPED | OUTPATIENT
Start: 2020-09-15 | End: 2020-11-09

## 2020-09-15 NOTE — TELEPHONE ENCOUNTER
Rex Colon    Patient needs refill, 15 left  Ibuprofen-Famotidine (Duexis) 800-26 6 MG TABS [[id number]]     PLEASE USE [facility] - ZACK Chavarria - [facility] [address]  [facility]      Order Details   Dose: 1 tablet  Route: Oral  Frequency: Daily PRN for pain    Dispense Quantity: 90 tablet  Refills: 0  Fills remaining: --             Sig: Take 1 tablet by mouth daily as needed (pain)            Written Date: 06/08/20  Expiration Date: 06/08/21      Start Date: 06/08/20  End Date: --              Ordering Provider: --  Phone:  --  Fax:  --     Address:  --  GERARDO #:  [id number]  NPI:  --             Authorizing Provider: Bryant Estrella MD  Phone:  [phone]  Fax:  [phone]     Address:  [place] [address]  GERARDO #:  [id number]  NPI:  [id number]             Ordering User: Bryant Estrella MD             Diagnosis Association:  Ankylosing spondylitis of multiple sites in spine ([address] ) (M45 0)       Original Order:  Ibuprofen-Famotidine (DUEXIS) 800-26 6 MG TABS [[id number]]       Pharmacy:  [address]    Phone:  [phone]    Fax:  [phone]    Address:  [address] [place] [address]  [id number]    GERARDO #:  --    Pharmacy Comments: --            Fill quantity remaining: --  Fill quantity used: --  Next fill due: --        Warnings History     No Interaction Warnings Shown     E-Prescribing Status     Outpatient Medication Detail     Ibuprofen-Famotidine (Duexis) 800-26 6 MG TABS         Sig: Take 1 tablet by mouth daily as needed (pain)         Sent to pharmacy as: Ibuprofen-Famotidine (DUEXIS) 800-26 6 MG TABS         Class: Normal         Route: Oral         E-Prescribing Status: Receipt confirmed by pharmacy (6/8/2020 11:39 AM EDT)         Event History        Event History    Tracking Links     Cosign Tracking  Order Transmittal Tracking

## 2020-09-22 ENCOUNTER — APPOINTMENT (OUTPATIENT)
Dept: LAB | Facility: CLINIC | Age: 35
End: 2020-09-22
Payer: COMMERCIAL

## 2020-09-22 ENCOUNTER — TRANSCRIBE ORDERS (OUTPATIENT)
Dept: LAB | Facility: CLINIC | Age: 35
End: 2020-09-22

## 2020-09-22 ENCOUNTER — OFFICE VISIT (OUTPATIENT)
Dept: RHEUMATOLOGY | Facility: CLINIC | Age: 35
End: 2020-09-22
Payer: COMMERCIAL

## 2020-09-22 VITALS
BODY MASS INDEX: 21.89 KG/M2 | TEMPERATURE: 98.3 F | SYSTOLIC BLOOD PRESSURE: 122 MMHG | DIASTOLIC BLOOD PRESSURE: 76 MMHG | HEIGHT: 68 IN | HEART RATE: 74 BPM | WEIGHT: 144.4 LBS

## 2020-09-22 DIAGNOSIS — Z79.1 NSAID LONG-TERM USE: ICD-10-CM

## 2020-09-22 DIAGNOSIS — Z11.1 SCREENING-PULMONARY TB: ICD-10-CM

## 2020-09-22 DIAGNOSIS — Z79.899 LONG TERM USE OF DRUG: ICD-10-CM

## 2020-09-22 DIAGNOSIS — M45.0 ANKYLOSING SPONDYLITIS OF MULTIPLE SITES IN SPINE (HCC): Primary | ICD-10-CM

## 2020-09-22 LAB
ALBUMIN SERPL BCP-MCNC: 3.7 G/DL (ref 3.5–5)
ALP SERPL-CCNC: 81 U/L (ref 46–116)
ALT SERPL W P-5'-P-CCNC: 24 U/L (ref 12–78)
ANION GAP SERPL CALCULATED.3IONS-SCNC: 9 MMOL/L (ref 4–13)
AST SERPL W P-5'-P-CCNC: 15 U/L (ref 5–45)
BASOPHILS # BLD AUTO: 0.03 THOUSANDS/ΜL (ref 0–0.1)
BASOPHILS NFR BLD AUTO: 0 % (ref 0–1)
BILIRUB SERPL-MCNC: 0.26 MG/DL (ref 0.2–1)
BUN SERPL-MCNC: 17 MG/DL (ref 5–25)
CALCIUM SERPL-MCNC: 9.4 MG/DL (ref 8.3–10.1)
CHLORIDE SERPL-SCNC: 102 MMOL/L (ref 100–108)
CO2 SERPL-SCNC: 28 MMOL/L (ref 21–32)
CREAT SERPL-MCNC: 1.15 MG/DL (ref 0.6–1.3)
CRP SERPL QL: 31.2 MG/L
EOSINOPHIL # BLD AUTO: 0.43 THOUSAND/ΜL (ref 0–0.61)
EOSINOPHIL NFR BLD AUTO: 5 % (ref 0–6)
ERYTHROCYTE [DISTWIDTH] IN BLOOD BY AUTOMATED COUNT: 13.6 % (ref 11.6–15.1)
ERYTHROCYTE [SEDIMENTATION RATE] IN BLOOD: 28 MM/HOUR (ref 0–14)
GFR SERPL CREATININE-BSD FRML MDRD: 84 ML/MIN/1.73SQ M
GLUCOSE SERPL-MCNC: 104 MG/DL (ref 65–140)
HCT VFR BLD AUTO: 45.9 % (ref 36.5–49.3)
HGB BLD-MCNC: 14.7 G/DL (ref 12–17)
IMM GRANULOCYTES # BLD AUTO: 0.02 THOUSAND/UL (ref 0–0.2)
IMM GRANULOCYTES NFR BLD AUTO: 0 % (ref 0–2)
LYMPHOCYTES # BLD AUTO: 2.77 THOUSANDS/ΜL (ref 0.6–4.47)
LYMPHOCYTES NFR BLD AUTO: 29 % (ref 14–44)
MCH RBC QN AUTO: 26.5 PG (ref 26.8–34.3)
MCHC RBC AUTO-ENTMCNC: 32 G/DL (ref 31.4–37.4)
MCV RBC AUTO: 83 FL (ref 82–98)
MONOCYTES # BLD AUTO: 0.92 THOUSAND/ΜL (ref 0.17–1.22)
MONOCYTES NFR BLD AUTO: 10 % (ref 4–12)
NEUTROPHILS # BLD AUTO: 5.44 THOUSANDS/ΜL (ref 1.85–7.62)
NEUTS SEG NFR BLD AUTO: 56 % (ref 43–75)
NRBC BLD AUTO-RTO: 0 /100 WBCS
PLATELET # BLD AUTO: 386 THOUSANDS/UL (ref 149–390)
PMV BLD AUTO: 8.4 FL (ref 8.9–12.7)
POTASSIUM SERPL-SCNC: 3.9 MMOL/L (ref 3.5–5.3)
PROT SERPL-MCNC: 7.4 G/DL (ref 6.4–8.2)
RBC # BLD AUTO: 5.54 MILLION/UL (ref 3.88–5.62)
SODIUM SERPL-SCNC: 139 MMOL/L (ref 136–145)
WBC # BLD AUTO: 9.61 THOUSAND/UL (ref 4.31–10.16)

## 2020-09-22 PROCEDURE — 4004F PT TOBACCO SCREEN RCVD TLK: CPT | Performed by: INTERNAL MEDICINE

## 2020-09-22 PROCEDURE — 36415 COLL VENOUS BLD VENIPUNCTURE: CPT | Performed by: INTERNAL MEDICINE

## 2020-09-22 PROCEDURE — 86480 TB TEST CELL IMMUN MEASURE: CPT | Performed by: INTERNAL MEDICINE

## 2020-09-22 PROCEDURE — 86140 C-REACTIVE PROTEIN: CPT | Performed by: INTERNAL MEDICINE

## 2020-09-22 PROCEDURE — 85025 COMPLETE CBC W/AUTO DIFF WBC: CPT | Performed by: INTERNAL MEDICINE

## 2020-09-22 PROCEDURE — 99214 OFFICE O/P EST MOD 30 MIN: CPT | Performed by: INTERNAL MEDICINE

## 2020-09-22 PROCEDURE — 80053 COMPREHEN METABOLIC PANEL: CPT | Performed by: INTERNAL MEDICINE

## 2020-09-22 PROCEDURE — 85652 RBC SED RATE AUTOMATED: CPT | Performed by: INTERNAL MEDICINE

## 2020-09-22 NOTE — PROGRESS NOTES
Assessment and Plan:   Patient is a 79-year-old male who presents for rheumatology follow-up for ankylosing spondylitis  Has been on Enbrel now for over 10 years and also has use NSAIDs chronically  Remains on Duexis which has always worked well for him  He try diclofenac once and then not again and we discussed that he should retry this to see if it works better for his current complaints than the Duexis is working  He was agreeable with that plan  We also discussed that he has been using NSAIDs chronically and it would be better if we were able to get his symptoms under control without chronic NSAID use  I did suggest trying Humira rather than staying with Enbrel but he was hesitant to change therapies  Previously we tried Remicade which he only had 1 dose and had some sort of burning sensation when the medication was infusing and so did not want to retry this  He is open to thinking about the Humira but for now would like to continue with the Enbrel and try the diclofenac again  He will also get some updated blood work at this time  We will follow-up in about 3 months to see how he is doing and determine change in therapy  Plan:  Diagnoses and all orders for this visit:    Ankylosing spondylitis of multiple sites in spine ([facility] [address] )    Long term use of drug  -     CBC and differential  -     Comprehensive metabolic panel  -     C-reactive protein  -     Sedimentation rate, automated  -     Quantiferon TB Gold Plus    Screening-pulmonary TB  -     Quantiferon TB Gold Plus    NSAID long-term use        Follow-up plan: 3 months        Rheumatic Disease Summary  1  Ankylosing spondylitis   Established care- August, 2017- hx of AS dx with LVH in 2006   Did not start treatment at that time although Enbrel was recommended   Then seen by [facility] in 2009 and started on Enbrel with Celebrex, Tramadol, and Flexeril; now taking Duexis  -XR lumbar/SI joints 2017: mild squaring of lumbar vertebra with enthesopathic changes in facet joint and at costotransverse level; erosive changes at B/L SI joints with some surrounding sclerosis suggestive sacroiliitis, moderate left hip narrowing   -MRI pelvis 1/16/19: mild B/L chronic sacroiliitis, no edema, effusions or definite erosions; minimal right mid-SI joint ankylosis; mild new left iliopsoas insertional and right common hamstring tendon origin, presumably active enthesitis vs strain  -Visit 9/16/19: progressive worsening mid and lower back pain, enbrel less effective, also symptoms of costochondritis; prior auth for remicade, cont enbrel until able to start remicade   -XR cervical/thoracic/lumbar 9/2019: facet fusion in lumbar, normal cervical/thoracic   -Started 1st remicade infusion 10/10/19 but got flushed (most likely from steroids given), but patient did not want to proceed and wanted to continue with Enbrel   -Visit 1/17/20: still back pain but sounds non-inflammatory; referred to PT, wants to stay with Enbrel, switched Duexis to Diclofenac   2  Drug Monitoring                                                3  Comorbidities: h/o left labral tear s/p repair     HPI  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 who presents for rheumatology follow-up for ankylosing spondylitis  Last visit we tried diclofenac rather Duexis as he had been on that for quite some time and was wondering if another NSAID would be better  Reports he tried 1 tab of diclofenac but felt "funny" and so did not take it again and went back to the Duexis  He is interested in read trying the diclofenac because he is unsure if it was really related to the diclofenac  Still feeling worse as the day progresses in terms of his back pain  Feels like he gets muscle tension and stiffness as the day progresses work  He still is feeling well mostly in the morning without significant stiffness or pain and the pain worsens day  Denies any peripheral joint pain or swelling  No rashes    He is still taking Enbrel every week  The following portions of the patient's history were reviewed and updated as appropriate: allergies, current medications, past family history, past medical history, past social history, past surgical history and problem list     Review of Systems:   Review of Systems   Constitutional: Negative for fatigue and unexpected weight change  HENT: Negative for mouth sores  Respiratory: Negative for cough and shortness of breath  Gastrointestinal: Negative for constipation and diarrhea  Musculoskeletal: Positive for back pain and myalgias  Negative for arthralgias and joint swelling  Skin: Negative for color change and rash  Neurological: Negative for weakness  Psychiatric/Behavioral: Negative for sleep disturbance  Home Medications:    Current Outpatient Medications:     diclofenac (VOLTAREN) 75 mg EC tablet, Take 1 tablet (75 mg total) by mouth 2 (two) times a day, Disp: 60 tablet, Rfl: 3    ENBREL 50 MG/ML SOSY, INJECT 50 MG (1 ML) UNDER THE SKIN ONCE A WEEK (SATURDAY OR SUNDAY), Disp: 12 mL, Rfl: 4    etanercept (ENBREL SURECLICK) 50 MG/ML injection, Inject 1 mL (50 mg total) under the skin once a week, Disp: 12 Syringe, Rfl: 1    Ibuprofen-Famotidine (Duexis) 800-26 6 MG TABS, Take 1 tablet by mouth daily as needed (pain), Disp: 90 tablet, Rfl: 0    methocarbamol (ROBAXIN) 500 mg tablet, Take 1 tablet (500 mg total) by mouth daily, Disp: 30 tablet, Rfl: 2    Objective:    Vitals:    09/22/20 1546   BP: 122/76   BP Location: Left arm   Patient Position: Sitting   Cuff Size: Standard   Pulse: 74   Temp: 98 3 °F (36 8 °C)   TempSrc: Tympanic   Weight: 65 5 kg (144 lb 6 4 oz)   Height: 5' 8" (1 727 m)       Physical Exam  Constitutional:       General: He is not in acute distress  Appearance: He is well-developed  HENT:      Head: Normocephalic and atraumatic  Eyes:      General: Lids are normal  No scleral icterus       Conjunctiva/sclera: Conjunctivae normal  Neck:      Musculoskeletal: Neck supple  Pulmonary:      Effort: Pulmonary effort is normal  No tachypnea, accessory muscle usage or respiratory distress  Musculoskeletal:      Comments: No joint swelling or synovitis anywhere  Skin:     General: Skin is dry  Findings: No rash  Neurological:      Mental Status: He is alert  Psychiatric:         Behavior: Behavior normal  Behavior is cooperative

## 2020-09-24 LAB
GAMMA INTERFERON BACKGROUND BLD IA-ACNC: 0.02 IU/ML
M TB IFN-G BLD-IMP: NEGATIVE
M TB IFN-G CD4+ BCKGRND COR BLD-ACNC: 0 IU/ML
M TB IFN-G CD4+ BCKGRND COR BLD-ACNC: 0 IU/ML
MITOGEN IGNF BCKGRD COR BLD-ACNC: >10 IU/ML

## 2020-11-09 DIAGNOSIS — M45.0 ANKYLOSING SPONDYLITIS OF MULTIPLE SITES IN SPINE (HCC): ICD-10-CM

## 2020-11-09 RX ORDER — IBUPROFEN AND FAMOTIDINE 800; 26.6 MG/1; MG/1
TABLET, COATED ORAL
Qty: 90 TABLET | Refills: 1 | Status: SHIPPED | OUTPATIENT
Start: 2020-11-09 | End: 2021-10-14 | Stop reason: ALTCHOICE

## 2020-11-18 DIAGNOSIS — M45.0 ANKYLOSING SPONDYLITIS OF MULTIPLE SITES IN SPINE (HCC): ICD-10-CM

## 2020-11-18 RX ORDER — ETANERCEPT 50 MG/ML
SOLUTION SUBCUTANEOUS
Qty: 12 ML | Refills: 3 | Status: SHIPPED | OUTPATIENT
Start: 2020-11-18 | End: 2021-11-11 | Stop reason: SDUPTHER

## 2021-01-13 NOTE — PROGRESS NOTES
Assessment and Plan:   Patient is a 63-year-old male with longstanding ankylosing spondylitis who presents for rheumatology follow-up  He remains on treatment with Enbrel which he has been on for over 10 years and also Duexis which he continues to take on a daily basis  He does have history of long-term chronic NSAID use for also over 10 years and he continues to take it on a daily basis  We reviewed that this can have long-term adverse effects including gastritis and renal dysfunction which he is aware of  He does not feel he is able to reduce his Duexis use at this time due to his chronic lower back pain while he is working  We discussed that his pain is atypical for active AS and sounds more suggestive of noninflammatory back pain which could be from damage due to longstanding AS  We reviewed that his renal function was impaired in September compared to his previous labs which is possibly a result from his NSAID use  He was understanding of this but again does not feel he can reduce the Duexis use at this time  We also discussed again that it is worthwhile trying Humira to see if he has better control of his pain if some of it is related to active AS  He would like to continue thinking about Humira in the meantime will continue with Enbrel  We will recheck his labs in 3 months and follow-up at that time  We discussed if his renal function is still not back to where was previously we need to switch to Humira to see if he can reduce his Duexis use and he was agreeable with that  In the meantime he was agreeable to see spine and Pain Management to see if they can offer any treatment options for his chronic back pain that could be related to secondary damage from AS and degenerative disease      Plan:  Diagnoses and all orders for this visit:    Ankylosing spondylitis of multiple sites in spine ([facility] [address] )  -     C-reactive protein  -     Comprehensive metabolic panel  -     Sedimentation rate, automated  - CBC and differential  -     Ambulatory referral to Pain Management; Future    NSAID long-term use  -     C-reactive protein  -     Comprehensive metabolic panel  -     Sedimentation rate, automated  -     CBC and differential    Long term use of drug    Chronic bilateral low back pain without sciatica  -     Ambulatory referral to Pain Management; Future        Follow-up plan: 3 months        Rheumatic Disease Summary  1  Ankylosing spondylitis   Established care- August, 2017- hx of AS dx with LVH in 2006   Did not start treatment at that time although Enbrel was recommended   Then seen by [facility] in 2009 and started on Enbrel with Celebrex, Tramadol, and Flexeril; now taking Duexis  -XR lumbar/SI joints 2017: mild squaring of lumbar vertebra with enthesopathic changes in facet joint and at costotransverse level; erosive changes at B/L SI joints with some surrounding sclerosis suggestive sacroiliitis, moderate left hip narrowing   -MRI pelvis 1/16/19: mild B/L chronic sacroiliitis, no edema, effusions or definite erosions; minimal right mid-SI joint ankylosis; mild new left iliopsoas insertional and right common hamstring tendon origin, presumably active enthesitis vs strain  -Visit 9/16/19: progressive worsening mid and lower back pain, enbrel less effective, also symptoms of costochondritis; prior auth for remicade, cont enbrel until able to start remicade   -XR cervical/thoracic/lumbar 9/2019: facet fusion in lumbar, normal cervical/thoracic   -Started 1st remicade infusion 10/10/19 but got flushed (most likely from steroids given), but patient did not want to proceed and wanted to continue with Enbrel   -Visit 1/17/20: still back pain but sounds non-inflammatory; referred to PT, wants to stay with Enbrel, switched Duexis to Diclofenac  -Visit 9/22/20: retry diclofenac, consider changing to Humira   -Visit 1/15/21: still requiring Duexis everyday, still hesitant to change to humira, reviewed REYES on labs from 9/2020; will repeat labs and f/u in 3 months and consider humira; referred to pain management for secondary OA   2  Drug Monitoring                                                3  Comorbidities: h/o left labral tear s/p repair     HPI  Amelia Grande is a 37 y o   male who presents for rheumatology follow-up for ankylosing spondylitis  Patient is still taking enbrel and duexis everyday  He takes the duexis in the morning, and 2 tylenol at night  The Duexis still works very well for him but he is aware that long-term NSAID use does have adverse effects  He does sometimes notice heartburn and uses Tums more frequently  We also reviewed that his labs from September last year indicate impaired renal function compared to his previous lab work which is concerning given the long-term NSAID use  At the time he also had increased systemic inflammation and is unclear exactly what was going on at that point for him as he cannot recall  He still struggles with the lower back pain on a daily basis which does get worse with activity and better with rest, which is atypical for active AS which we again reviewed  He still has no significant morning stiffness that would be more typical for active AS  He has not seen spine and Pain Management  We discussed also that some of his pain is noninflammatory from previous damage  He is still hesitant to switch Enbrel to Humira since he has done well with Enbrel over the years  We discussed again that the role would be to see if we can have him utilize the Duexis less than he is doing and it is worth trying the Humira to see if he is able to reduce the Duexis use       The following portions of the patient's history were reviewed and updated as appropriate: allergies, current medications, past family history, past medical history, past social history, past surgical history and problem list     Review of Systems:   Review of Systems   Constitutional: Negative for fatigue and unexpected weight change  HENT: Negative for mouth sores  Respiratory: Negative for cough and shortness of breath  Gastrointestinal: Negative for constipation and diarrhea  Musculoskeletal: Positive for back pain  Negative for arthralgias, joint swelling and myalgias  Skin: Negative for color change and rash  Neurological: Negative for weakness  Psychiatric/Behavioral: Negative for sleep disturbance  Home Medications:    Current Outpatient Medications:     diclofenac (VOLTAREN) 75 mg EC tablet, Take 1 tablet (75 mg total) by mouth 2 (two) times a day, Disp: 60 tablet, Rfl: 3    Duexis 800-26 6 MG TABS, TAKE ONE TABLET BY MOUTH DAILY AS NEEDED FOR PAIN, Disp: 90 tablet, Rfl: 1    Enbrel 50 MG/ML SOSY, INJECT 50 MG (1 ML) UNDER THE SKIN ONCE A WEEK (SATURDAY OR SUNDAY), Disp: 12 mL, Rfl: 3    etanercept (ENBREL SURECLICK) 50 MG/ML injection, Inject 1 mL (50 mg total) under the skin once a week, Disp: 12 Syringe, Rfl: 1    methocarbamol (ROBAXIN) 500 mg tablet, Take 1 tablet (500 mg total) by mouth daily, Disp: 30 tablet, Rfl: 2    Objective:    Vitals:    01/15/21 1501   BP: 138/80   BP Location: Left arm   Patient Position: Sitting   Cuff Size: Standard   Pulse: 74   Weight: 65 5 kg (144 lb 6 4 oz)   Height: 5' 8" (1 727 m)       Physical Exam  Constitutional:       General: He is not in acute distress  Appearance: He is well-developed  HENT:      Head: Normocephalic and atraumatic  Eyes:      General: Lids are normal  No scleral icterus  Conjunctiva/sclera: Conjunctivae normal    Neck:      Musculoskeletal: Neck supple  Pulmonary:      Effort: Pulmonary effort is normal  No tachypnea, accessory muscle usage or respiratory distress  Musculoskeletal:      Comments: No joint swelling noted   Skin:     General: Skin is dry  Findings: No rash  Neurological:      Mental Status: He is alert  Psychiatric:         Behavior: Behavior normal  Behavior is cooperative  Labs:   Component      Latest Ref Rng & Units 9/22/2020   WBC      4 31 - 10 16 Thousand/uL 9 61   Red Blood Cell Count      3 88 - 5 62 Million/uL 5 54   Hemoglobin      12 0 - 17 0 g/dL 14 7   HCT      36 5 - 49 3 % 45 9   MCV      82 - 98 fL 83   MCH      26 8 - 34 3 pg 26 5 (L)   MCHC      31 4 - 37 4 g/dL 32 0   RDW      11 6 - 15 1 % 13 6   MPV      8 9 - 12 7 fL 8 4 (L)   Platelet Count      575 - 390 Thousands/uL 386   nRBC      /100 WBCs 0   Neutrophils %      43 - 75 % 56   Immat GRANS %      0 - 2 % 0   Lymphocytes Relative      14 - 44 % 29   Monocytes Relative      4 - 12 % 10   Eosinophils      0 - 6 % 5   Basophils Relative      0 - 1 % 0   Absolute Neutrophils      1 85 - 7 62 Thousands/µL 5 44   Immature Grans Absolute      0 00 - 0 20 Thousand/uL 0 02   Lymphocytes Absolute      0 60 - 4 47 Thousands/µL 2 77   Absolute Monocytes      0 17 - 1 22 Thousand/µL 0 92   Absolute Eosinophils      0 00 - 0 61 Thousand/µL 0 43   Basophils Absolute      0 00 - 0 10 Thousands/µL 0 03   Sodium      136 - 145 mmol/L 139   Potassium      3 5 - 5 3 mmol/L 3 9   Chloride      100 - 108 mmol/L 102   CO2      21 - 32 mmol/L 28   Anion Gap      4 - 13 mmol/L 9   BUN      5 - 25 mg/dL 17   Creatinine      0 60 - 1 30 mg/dL 1 15   Glucose, Random      65 - 140 mg/dL 104   Calcium      8 3 - 10 1 mg/dL 9 4   AST      5 - 45 U/L 15   ALT      12 - 78 U/L 24   Alkaline Phosphatase      46 - 116 U/L 81   Total Protein      6 4 - 8 2 g/dL 7 4   Albumin      3 5 - 5 0 g/dL 3 7   TOTAL BILIRUBIN      0 20 - 1 00 mg/dL 0 26   eGFR      ml/min/1 73sq m 84   QFT Nil      0 - 8 0 IU/ml 0 02   QFT TB1-NIL      IU/ml 0 00   QFT TB2-NIL      IU/ml 0 00   QFT Mitogen-NIL      IU/ml >10 00   QFT Final Interpretation      Negative Negative   C-REACTIVE PROTEIN      <3 0 mg/L 31 2 (H)   Sed Rate      0 - 14 mm/hour 28 (H)

## 2021-01-15 ENCOUNTER — OFFICE VISIT (OUTPATIENT)
Dept: RHEUMATOLOGY | Facility: CLINIC | Age: 36
End: 2021-01-15
Payer: COMMERCIAL

## 2021-01-15 VITALS
WEIGHT: 144.4 LBS | HEART RATE: 74 BPM | SYSTOLIC BLOOD PRESSURE: 138 MMHG | BODY MASS INDEX: 21.89 KG/M2 | DIASTOLIC BLOOD PRESSURE: 80 MMHG | HEIGHT: 68 IN

## 2021-01-15 DIAGNOSIS — Z79.899 LONG TERM USE OF DRUG: ICD-10-CM

## 2021-01-15 DIAGNOSIS — Z79.1 NSAID LONG-TERM USE: ICD-10-CM

## 2021-01-15 DIAGNOSIS — G89.29 CHRONIC BILATERAL LOW BACK PAIN WITHOUT SCIATICA: ICD-10-CM

## 2021-01-15 DIAGNOSIS — M45.0 ANKYLOSING SPONDYLITIS OF MULTIPLE SITES IN SPINE (HCC): Primary | ICD-10-CM

## 2021-01-15 DIAGNOSIS — M54.50 CHRONIC BILATERAL LOW BACK PAIN WITHOUT SCIATICA: ICD-10-CM

## 2021-01-15 PROCEDURE — 3008F BODY MASS INDEX DOCD: CPT | Performed by: ANESTHESIOLOGY

## 2021-01-15 PROCEDURE — 99214 OFFICE O/P EST MOD 30 MIN: CPT | Performed by: INTERNAL MEDICINE

## 2021-01-20 ENCOUNTER — CONSULT (OUTPATIENT)
Dept: PAIN MEDICINE | Facility: CLINIC | Age: 36
End: 2021-01-20
Payer: COMMERCIAL

## 2021-01-20 ENCOUNTER — TRANSCRIBE ORDERS (OUTPATIENT)
Dept: PAIN MEDICINE | Facility: CLINIC | Age: 36
End: 2021-01-20

## 2021-01-20 VITALS
WEIGHT: 144 LBS | HEART RATE: 66 BPM | SYSTOLIC BLOOD PRESSURE: 116 MMHG | DIASTOLIC BLOOD PRESSURE: 72 MMHG | BODY MASS INDEX: 21.9 KG/M2

## 2021-01-20 DIAGNOSIS — M54.50 CHRONIC BILATERAL LOW BACK PAIN WITHOUT SCIATICA: ICD-10-CM

## 2021-01-20 DIAGNOSIS — M51.16 INTERVERTEBRAL DISC DISORDER WITH RADICULOPATHY OF LUMBAR REGION: Primary | ICD-10-CM

## 2021-01-20 DIAGNOSIS — M45.0 ANKYLOSING SPONDYLITIS OF MULTIPLE SITES IN SPINE (HCC): ICD-10-CM

## 2021-01-20 DIAGNOSIS — G89.29 CHRONIC BILATERAL LOW BACK PAIN WITHOUT SCIATICA: ICD-10-CM

## 2021-01-20 PROCEDURE — 99244 OFF/OP CNSLTJ NEW/EST MOD 40: CPT | Performed by: ANESTHESIOLOGY

## 2021-01-20 PROCEDURE — 4004F PT TOBACCO SCREEN RCVD TLK: CPT | Performed by: ANESTHESIOLOGY

## 2021-01-20 NOTE — PATIENT INSTRUCTIONS

## 2021-01-20 NOTE — PROGRESS NOTES
Assessment  1  Intervertebral disc disorder with radiculopathy of lumbar region    2  Ankylosing spondylitis of multiple sites in spine ([address] )    3  Chronic bilateral low back pain without sciatica        Plan  The patient's symptoms, history/physical are consistent with pain that is multifactorial in origin but predominantly the result of his underlying ankylosing spondylitis as well as disc bulging as seen on his recent MRI pelvis  At this time, I will order an MRI of the lumbar spine to fully evaluate the lumbar spine  I advised him I will call with the results and discuss treatment moving forward which will likely include performing an epidural steroid injection  For now, he will continue with his current medication regimen of Duexis with ibuprofen as needed  My impressions and treatment recommendations were discussed in detail with the patient who verbalized understanding and had no further questions  Discharge instructions were provided  I personally saw and examined the patient and I agree with the above discussed plan of care  Orders Placed This Encounter   Procedures    MRI lumbar spine without contrast     Standing Status:   Future     Standing Expiration Date:   1/20/2025     Scheduling Instructions: There is no preparation for this test  Please leave your jewelry and valuables at home, wedding rings are the exception  Magnetic nail polish must be removed prior to arrival for your test  Please bring your insurance cards, a form of photo ID and a list of your medications with you  Arrive 15 minutes prior to your appointment time in order to register  Please bring any prior CT or MRI studies of this area that were not performed at a [facility]  To schedule this appointment, please contact Central Scheduling at [phone]  Prior to your appointment, please make sure you complete the MRI Screening Form when you e-Check in for your appointment   This will be available starting 7 days before your appointment in [place] [facility]  You may receive an e-mail with an activation code if you do not have a [employer] account  If you do not have access to a device, we will complete your screening at your appointment  Order Specific Question:   What is the patient's sedation requirement? Answer:   No Sedation     Order Specific Question:   Release to patient through [employer]     Answer:   Immediate     Order Specific Question:   Is order priority selected as STAT? Answer:   No     Order Specific Question:   Reason for Exam (FREE TEXT)     Answer:   lbp with radiating leg pain     No orders of the defined types were placed in this encounter  History of Present Illness    Sandra Lambert is a 37 y o  male referred by Dr Lilia Beach who presents for consultation in regards to back pain  Symptoms have been present for 15 years  He has an underlying diagnosis of ankylosing spondylitis  Pain is moderate to severe rated 8/10 on numeric rating scale and felt nearly constantly but worst in the afternoon, evening and nighttime  Symptoms are dull, aching, sharp and burning throughout the back  Pain symptoms are aggravated physical activity including lying down, standing, bending, sitting, walking  There is no change with coughing, sneezing or bowel movements  Treatment history has included Enbrel, Duexis and ibuprofen  I have personally reviewed and/or updated the patient's past medical history, past surgical history, family history, social history, current medications, allergies, and vital signs today  Review of Systems   Constitutional: Negative for fever and unexpected weight change  HENT: Negative for trouble swallowing  Eyes: Negative for visual disturbance  Respiratory: Negative for shortness of breath and wheezing  Cardiovascular: Negative for chest pain and palpitations  Gastrointestinal: Negative for constipation, diarrhea, nausea and vomiting     Endocrine: Negative for cold intolerance, heat intolerance and polydipsia  Genitourinary: Negative for difficulty urinating and frequency  Musculoskeletal: Positive for back pain and joint swelling  Negative for arthralgias, gait problem and myalgias  Skin: Negative for rash  Neurological: Negative for dizziness, seizures, syncope, weakness and headaches  Hematological: Does not bruise/bleed easily  Psychiatric/Behavioral: Negative for dysphoric mood  All other systems reviewed and are negative        Patient Active Problem List   Diagnosis    AS (ankylosing spondylitis) (HCC)    Primary localized osteoarthritis of left hip    NSAID long-term use    Encounter for monitoring of etanercept therapy    Ankylosing spondylitis of multiple sites in spine [facility])       Past Medical History:   Diagnosis Date    Ankylosing spondylitis ([facility] [address] )     Arthritis     Kidney stone        Past Surgical History:   Procedure Laterality Date    OK HIP SCOPE/REMV BODY,PLASTY/RESECTN Left 6/1/2017    Procedure: LEFT HIP ARTHROSCOPIC LABRAL DEBRIDEMENT;  Surgeon: Angi Parsons MD;  Location: [facility];  Service: Orthopedics    WISDOM TOOTH EXTRACTION         Family History   Problem Relation Age of Onset    No Known Problems Mother     No Known Problems Father     No Known Problems Maternal Grandmother     No Known Problems Maternal Grandfather     No Known Problems Paternal Grandmother     Skin cancer Paternal Grandfather        Social History     Occupational History    Not on file   Tobacco Use    Smoking status: Current Every Day Smoker     Packs/day: 1 00     Years: 15 00     Pack years: 15 00     Types: Cigarettes    Smokeless tobacco: Never Used   Substance and Sexual Activity    Alcohol use: No    Drug use: No    Sexual activity: Not on file       Current Outpatient Medications on File Prior to Visit   Medication Sig    diclofenac (VOLTAREN) 75 mg EC tablet Take 1 tablet (75 mg total) by mouth 2 (two) times a day    Duexis 800-26 6 MG TABS TAKE ONE TABLET BY MOUTH DAILY AS NEEDED FOR PAIN    Enbrel 50 MG/ML SOSY INJECT 50 MG (1 ML) UNDER THE SKIN ONCE A WEEK (SATURDAY OR SUNDAY)    methocarbamol (ROBAXIN) 500 mg tablet Take 1 tablet (500 mg total) by mouth daily     No current facility-administered medications on file prior to visit  No Known Allergies    Physical Exam    /72   Pulse 66   Wt 65 3 kg (144 lb)   BMI 21 90 kg/m²     Constitutional: normal, well developed, well nourished, alert, in no distress and non-toxic and no overt pain behavior  Eyes: anicteric  HEENT: grossly intact  Neck: supple, symmetric, trachea midline and no masses   Pulmonary:even and unlabored  Cardiovascular:No edema or pitting edema present  Skin:Normal without rashes or lesions and well hydrated  Psychiatric:Mood and affect appropriate  Neurologic:Cranial Nerves II-XII grossly intact  Musculoskeletal:normal     Lumbar Spine Exam  Appearance:  Kyphosis  Palpation/Tenderness:  left lumbar paraspinal tenderness  right lumbar paraspinal tenderness  Range of Motion:  Flexion:   Moderately limited  with pain  Extension:  Moderately limited  with pain  Lateral Flexion - Left:  Moderately limited  with pain  Lateral Flexion - Right:  Moderately limited  with pain  Motor Strength:  Left hip flexion:  4/5  Left hip extension:  5/5  Right hip flexion:  4/5  Right hip extension:  5/5  Left knee flexion:  5/5  Left knee extension:  5/5  Right knee flexion:  5/5  Right knee extension:  5/5  Left foot dorsiflexion:  5/5  Left foot plantar flexion:  5/5  Right foot dorsiflexion:  5/5  Right foot plantar flexion:  5/5  Reflexes:  Left Patellar:  2+   Right Patellar:  2+   Left Achilles:  2+   Right Achilles:  2+   Special Tests:  Left Straight Leg Test:  positive  Right Straight Leg Test:  positive    Imaging    MRI BONY PELVIS WITHOUT CONTRAST (1/16/2019)     INDICATION:   M45 0: Ankylosing spondylitis of multiple sites in spine      COMPARISON: Sacroiliac radiographs 8/21/2017 and left hip MRI 2/16/2017     TECHNIQUE:  MR sequences were obtained of the pelvis including: Localizer, Coronal STIR or coronal T2 fat sat, coronal T1, axial T1, axial T2 fat sat, sagittal T2 fat sat, Sagittal T1  Images were acquired on a 1 5 Tami unit  Gadolinium was not used      FINDINGS:     RIGHT HIP:     -JOINT EFFUSION: None      -BONE MARROW SIGNAL AND ALIGNMENT: No fracture or AVN  Punctate subchondral degenerative cysts in the acetabulum  New degenerative cysts anterior inferior femoral head and neck junction  Mild osteoarthritis  Tiny CAM lesion  Minimal edema in the   ischial tuberosity      -TROCHANTERIC BURSA: Normal      -MUSCLES AND TENDONS:  Trace greater trochanter enthesopathy  Otherwise unremarkable      LEFT HIP:     -JOINT EFFUSION: None      -BONE MARROW SIGNAL AND ALIGNMENT: No fracture or AVN  Minimal new edema in the lesser trochanter  Punctate subchondral degenerative cysts in the acetabulum  Mild osteoarthritis  Tiny CAM lesion      -TROCHANTERIC BURSA: Normal      -MUSCLES AND TENDONS:  Minimal new intermediate T2 signal in the iliopsoas insertion  No full-thickness tear  Minimal asymmetric intermediate T2 signal in the hamstring tendon origin  Trace greater trochanter enthesopathy  Otherwise unremarkable      BONY PELVIS:     -SI JOINTS AND SYMPHYSIS PUBIS:   Mild chronic bilateral sacroiliitis  No edema or significant effusion  Minimal anterior mid to lower SI joint ankylosis       -VISUALIZED LUMBAR SPINE:  Unremarkable      -OVERALL MARROW SIGNAL:  Normal, without suspicious focal lesions      -MUSCULATURE:  Unremarkable      -PELVIC SOFT TISSUES:   Normal      SUBCUTANEOUS TISSUES: Normal

## 2021-02-07 ENCOUNTER — HOSPITAL ENCOUNTER (OUTPATIENT)
Dept: MRI IMAGING | Facility: HOSPITAL | Age: 36
Discharge: HOME | End: 2021-02-07
Attending: ANESTHESIOLOGY
Payer: COMMERCIAL

## 2021-02-07 DIAGNOSIS — M51.16 INTERVERTEBRAL DISC DISORDER WITH RADICULOPATHY OF LUMBAR REGION: ICD-10-CM

## 2021-02-07 PROCEDURE — G1004 CDSM NDSC: HCPCS

## 2021-02-07 PROCEDURE — 72148 MRI LUMBAR SPINE W/O DYE: CPT

## 2021-02-08 ENCOUNTER — TELEPHONE (OUTPATIENT)
Dept: PAIN MEDICINE | Facility: CLINIC | Age: 36
End: 2021-02-08

## 2021-02-08 NOTE — TELEPHONE ENCOUNTER
Nilsa Cueto (Radiology ) called in to say that there are significant findings on the MRI   Please be advise thank you        Please call  back @ [phone]

## 2021-02-11 ENCOUNTER — TELEPHONE (OUTPATIENT)
Dept: PAIN MEDICINE | Facility: CLINIC | Age: 36
End: 2021-02-11

## 2021-02-11 DIAGNOSIS — M47.816 LUMBAR SPONDYLOSIS: Primary | ICD-10-CM

## 2021-02-11 NOTE — TELEPHONE ENCOUNTER
Discussed MRI lumbar spine results with patient which shows multilevel facet arthrosis secondary to ankylosing spondylitis  In addition he has swelling at the L1 likely related to the inflammatory changes  At this time, recommended proceeding with lumbar epidural steroid injection which he would like to try  Order placed for LULU

## 2021-02-11 NOTE — TELEPHONE ENCOUNTER
Scheduled pt for LESI for 2/19/21  Pt denies rx blood thinners  Went over pre-procedure instructions below:  Nothing to eat or drink 1 hr prior to procedure  Need to arrange transportation  Proper clothing for procedure  If ill or placed on antibiotics please call to reschedule  Covid/travel/ and vaccine instructions

## 2021-02-15 NOTE — TELEPHONE ENCOUNTER
Pt called to cancel procedure   Would like to hold off at this time and will call once he is ready to schedule

## 2021-04-12 NOTE — PROGRESS NOTES
Assessment and Plan:   Patient is a 46-year-old male with longstanding ankylosing spondylitis on Enbrel and chronic NSAID use with Duexis  He has been on this regimen for quite some time and overall has been stable  He continues to struggle with chronic mid to lower back pain and again describes that this progressively worsens through his day and that he has no morning stiffness  This again is atypical for AS pain and we discussed that possibly this is related to the findings on his MRI of the lumbar spine  Pain management felt that an injection would help him here but he was unable to arrange this with work  He plans to follow-up with them in the future when he is able but for now is holding off  I discussed with him that seeing them for the injection may ultimately help him cut down on the NSAID use which is the goal since he has been on chronic NSAIDs for a very long time  He was understanding  We will continue with Enbrel in follow-up again in 6 months  He will get blood work today before he leaves  Plan:  Diagnoses and all orders for this visit:    Ankylosing spondylitis of multiple sites in spine ([facility] [address] )    NSAID long-term use    High risk medication use    Facet arthropathy, multilevel        Follow-up plan: 6 months         Rheumatic Disease Summary  1  Ankylosing spondylitis   Established care- August, 2017- hx of AS dx with LVH in 2006   Did not start treatment at that time although Enbrel was recommended   Then seen by [facility] in 2009 and started on Enbrel with Celebrex, Tramadol, and Flexeril; now taking Duexis  -XR lumbar/SI joints 2017: mild squaring of lumbar vertebra with enthesopathic changes in facet joint and at costotransverse level; erosive changes at B/L SI joints with some surrounding sclerosis suggestive sacroiliitis, moderate left hip narrowing   -MRI pelvis 1/16/19: mild B/L chronic sacroiliitis, no edema, effusions or definite erosions; minimal right mid-SI joint ankylosis; mild new left iliopsoas insertional and right common hamstring tendon origin, presumably active enthesitis vs strain  -Visit 9/16/19: progressive worsening mid and lower back pain, enbrel less effective, also symptoms of costochondritis; prior auth for remicade, cont enbrel until able to start remicade   -XR cervical/thoracic/lumbar 9/2019: facet fusion in lumbar, normal cervical/thoracic   -Started 1st remicade infusion 10/10/19 but got flushed (most likely from steroids given), but patient did not want to proceed and wanted to continue with Enbrel   -Visit 1/17/20: still back pain but sounds non-inflammatory; referred to PT, wants to stay with Enbrel, switched Duexis to Diclofenac  -Visit 9/22/20: retry diclofenac, consider changing to Humira   -Visit 1/15/21: still requiring Duexis everyday, still hesitant to change to humira, reviewed REYES on labs from 9/2020; will repeat labs and f/u in 3 months and consider humira; referred to pain management for secondary OA  -Visit 4/15/21: holding off on injection with PM until able with work; cont enbrel and duexis, again discussed trying to cut down NSAID use    2  Drug Monitoring                                                3  Comorbidities: h/o left labral tear s/p repair     ANDRES Cheung Lissa is a 37 y o   male who presents for rheumatology follow-up for ankylosing spondylitis  At last visit, we discussed that on his previous blood work he had an increase in his creatinine and we discussed this could be a result long-term NSAID use as he continues on Duexis chronically  We again discussed switching biologics to try to reduce his NSAID use but he remained hesitant and wanted to continue with Enbrel  I also referred him to pain management for other treatment options and evaluation for other sources of his back pain besides his AS    He did see them and he had a lumbar spine MRI which showed facet arthropathy with inflammatory changes particularly around L1 with edema  They suggested epidural but patient wanted to hold off  Patient reports that he is interested in having the injection in the spine but was unable to arrange this with work  He does plan to do this once he is able with his work schedule  He feels the injection will help it seems like the area they identified on MRI is the area he has pain  He reports back pain is the same in this regard  Nothing new  No joint swelling peripherally  He is still having muscle pain in the back and was using the methocarbamol which helped but it made him too drowsy the following morning  States that the other muscle relaxers like Flexeril also did the same  Never tried baclofen or tizanidine, but also does not think the muscle relaxer is a significant help for him  Still taking the Duexis every day and admits sometimes he takes extra ibuprofen on days where he feels worse  The following portions of the patient's history were reviewed and updated as appropriate: allergies, current medications, past family history, past medical history, past social history, past surgical history and problem list     Review of Systems:   Review of Systems   Constitutional: Negative for fatigue and unexpected weight change  HENT: Negative for mouth sores  Respiratory: Negative for cough and shortness of breath  Gastrointestinal: Negative for constipation and diarrhea  Musculoskeletal: Positive for back pain  Negative for arthralgias, joint swelling and myalgias  Skin: Negative for color change and rash  Neurological: Negative for weakness  Psychiatric/Behavioral: Negative for sleep disturbance         Home Medications:    Current Outpatient Medications:     diclofenac (VOLTAREN) 75 mg EC tablet, Take 1 tablet (75 mg total) by mouth 2 (two) times a day, Disp: 60 tablet, Rfl: 3    Duexis 800-26 6 MG TABS, TAKE ONE TABLET BY MOUTH DAILY AS NEEDED FOR PAIN, Disp: 90 tablet, Rfl: 1    Enbrel 50 MG/ML SOSY, INJECT 50 MG (1 ML) UNDER THE SKIN ONCE A WEEK (SATURDAY OR SUNDAY), Disp: 12 mL, Rfl: 3    Objective:    Vitals:    04/15/21 1459   Pulse: 74   Weight: 65 3 kg (144 lb)   Height: 5' 8" (1 727 m)       Physical Exam  Constitutional:       General: He is not in acute distress  Appearance: He is well-developed  HENT:      Head: Normocephalic and atraumatic  Eyes:      General: Lids are normal  No scleral icterus  Conjunctiva/sclera: Conjunctivae normal    Neck:      Musculoskeletal: Neck supple  Pulmonary:      Effort: Pulmonary effort is normal  No tachypnea, accessory muscle usage or respiratory distress  Musculoskeletal:      Comments: Increased thoracic kyphosis   Skin:     General: Skin is dry  Findings: No rash  Neurological:      Mental Status: He is alert  Psychiatric:         Behavior: Behavior normal  Behavior is cooperative  Imaging:   MRI lumbar 2/7/21:  IMPRESSION:  Straightening of the lumbar spine with endplate degenerative edema and multilevel facet ankylosis in keeping with the patient's reported history of ankylosing spondylitis  Notable edema within the right more than left articular masses at L1-L2  Edema also extending into the right L1 transverse process  While this may simply represent inflammatory changes, Consider dedicated CT imaging if there is concern for underlying osseous injury  No compressive degenerative discogenic disease

## 2021-04-15 ENCOUNTER — OFFICE VISIT (OUTPATIENT)
Dept: RHEUMATOLOGY | Facility: CLINIC | Age: 36
End: 2021-04-15
Payer: COMMERCIAL

## 2021-04-15 ENCOUNTER — APPOINTMENT (OUTPATIENT)
Dept: LAB | Facility: CLINIC | Age: 36
End: 2021-04-15
Payer: COMMERCIAL

## 2021-04-15 VITALS — WEIGHT: 144 LBS | HEIGHT: 68 IN | BODY MASS INDEX: 21.82 KG/M2 | HEART RATE: 74 BPM

## 2021-04-15 DIAGNOSIS — M47.819 FACET ARTHROPATHY, MULTILEVEL: ICD-10-CM

## 2021-04-15 DIAGNOSIS — M45.0 ANKYLOSING SPONDYLITIS OF MULTIPLE SITES IN SPINE (HCC): Primary | ICD-10-CM

## 2021-04-15 DIAGNOSIS — Z79.899 HIGH RISK MEDICATION USE: ICD-10-CM

## 2021-04-15 DIAGNOSIS — Z79.1 NSAID LONG-TERM USE: ICD-10-CM

## 2021-04-15 LAB
ALBUMIN SERPL BCP-MCNC: 3.8 G/DL (ref 3.5–5)
ALP SERPL-CCNC: 87 U/L (ref 46–116)
ALT SERPL W P-5'-P-CCNC: 22 U/L (ref 12–78)
ANION GAP SERPL CALCULATED.3IONS-SCNC: 9 MMOL/L (ref 4–13)
AST SERPL W P-5'-P-CCNC: 13 U/L (ref 5–45)
BASOPHILS # BLD AUTO: 0.04 THOUSANDS/ΜL (ref 0–0.1)
BASOPHILS NFR BLD AUTO: 0 % (ref 0–1)
BILIRUB SERPL-MCNC: 0.41 MG/DL (ref 0.2–1)
BUN SERPL-MCNC: 22 MG/DL (ref 5–25)
CALCIUM SERPL-MCNC: 9.1 MG/DL (ref 8.3–10.1)
CHLORIDE SERPL-SCNC: 103 MMOL/L (ref 100–108)
CO2 SERPL-SCNC: 29 MMOL/L (ref 21–32)
CREAT SERPL-MCNC: 1.11 MG/DL (ref 0.6–1.3)
CRP SERPL QL: 32.2 MG/L
EOSINOPHIL # BLD AUTO: 0.48 THOUSAND/ΜL (ref 0–0.61)
EOSINOPHIL NFR BLD AUTO: 4 % (ref 0–6)
ERYTHROCYTE [DISTWIDTH] IN BLOOD BY AUTOMATED COUNT: 14.6 % (ref 11.6–15.1)
ERYTHROCYTE [SEDIMENTATION RATE] IN BLOOD: 18 MM/HOUR (ref 0–14)
GFR SERPL CREATININE-BSD FRML MDRD: 87 ML/MIN/1.73SQ M
GLUCOSE SERPL-MCNC: 78 MG/DL (ref 65–140)
HCT VFR BLD AUTO: 47.4 % (ref 36.5–49.3)
HGB BLD-MCNC: 14.8 G/DL (ref 12–17)
IMM GRANULOCYTES # BLD AUTO: 0.03 THOUSAND/UL (ref 0–0.2)
IMM GRANULOCYTES NFR BLD AUTO: 0 % (ref 0–2)
LYMPHOCYTES # BLD AUTO: 2.13 THOUSANDS/ΜL (ref 0.6–4.47)
LYMPHOCYTES NFR BLD AUTO: 20 % (ref 14–44)
MCH RBC QN AUTO: 25.9 PG (ref 26.8–34.3)
MCHC RBC AUTO-ENTMCNC: 31.2 G/DL (ref 31.4–37.4)
MCV RBC AUTO: 83 FL (ref 82–98)
MONOCYTES # BLD AUTO: 0.98 THOUSAND/ΜL (ref 0.17–1.22)
MONOCYTES NFR BLD AUTO: 9 % (ref 4–12)
NEUTROPHILS # BLD AUTO: 7.16 THOUSANDS/ΜL (ref 1.85–7.62)
NEUTS SEG NFR BLD AUTO: 67 % (ref 43–75)
NRBC BLD AUTO-RTO: 0 /100 WBCS
PLATELET # BLD AUTO: 409 THOUSANDS/UL (ref 149–390)
PMV BLD AUTO: 9.1 FL (ref 8.9–12.7)
POTASSIUM SERPL-SCNC: 4.1 MMOL/L (ref 3.5–5.3)
PROT SERPL-MCNC: 8.1 G/DL (ref 6.4–8.2)
RBC # BLD AUTO: 5.71 MILLION/UL (ref 3.88–5.62)
SODIUM SERPL-SCNC: 141 MMOL/L (ref 136–145)
WBC # BLD AUTO: 10.82 THOUSAND/UL (ref 4.31–10.16)

## 2021-04-15 PROCEDURE — 3008F BODY MASS INDEX DOCD: CPT | Performed by: INTERNAL MEDICINE

## 2021-04-15 PROCEDURE — 86140 C-REACTIVE PROTEIN: CPT | Performed by: INTERNAL MEDICINE

## 2021-04-15 PROCEDURE — 99214 OFFICE O/P EST MOD 30 MIN: CPT | Performed by: INTERNAL MEDICINE

## 2021-04-15 PROCEDURE — 4004F PT TOBACCO SCREEN RCVD TLK: CPT | Performed by: INTERNAL MEDICINE

## 2021-04-15 PROCEDURE — 80053 COMPREHEN METABOLIC PANEL: CPT | Performed by: INTERNAL MEDICINE

## 2021-04-15 PROCEDURE — 85652 RBC SED RATE AUTOMATED: CPT | Performed by: INTERNAL MEDICINE

## 2021-04-15 PROCEDURE — 85025 COMPLETE CBC W/AUTO DIFF WBC: CPT | Performed by: INTERNAL MEDICINE

## 2021-04-15 PROCEDURE — 36415 COLL VENOUS BLD VENIPUNCTURE: CPT | Performed by: INTERNAL MEDICINE

## 2021-10-14 ENCOUNTER — APPOINTMENT (OUTPATIENT)
Dept: LAB | Facility: CLINIC | Age: 36
End: 2021-10-14
Payer: COMMERCIAL

## 2021-10-14 ENCOUNTER — OFFICE VISIT (OUTPATIENT)
Dept: RHEUMATOLOGY | Facility: CLINIC | Age: 36
End: 2021-10-14
Payer: COMMERCIAL

## 2021-10-14 VITALS — BODY MASS INDEX: 21.82 KG/M2 | HEIGHT: 68 IN | WEIGHT: 144 LBS

## 2021-10-14 DIAGNOSIS — M45.0 ANKYLOSING SPONDYLITIS OF MULTIPLE SITES IN SPINE (HCC): Primary | ICD-10-CM

## 2021-10-14 DIAGNOSIS — M54.50 CHRONIC BILATERAL LOW BACK PAIN WITHOUT SCIATICA: ICD-10-CM

## 2021-10-14 DIAGNOSIS — G89.29 CHRONIC BILATERAL LOW BACK PAIN WITHOUT SCIATICA: ICD-10-CM

## 2021-10-14 DIAGNOSIS — Z79.1 NSAID LONG-TERM USE: ICD-10-CM

## 2021-10-14 DIAGNOSIS — Z79.899 HIGH RISK MEDICATION USE: ICD-10-CM

## 2021-10-14 LAB
ALBUMIN SERPL BCP-MCNC: 3.7 G/DL (ref 3.5–5)
ALP SERPL-CCNC: 89 U/L (ref 46–116)
ALT SERPL W P-5'-P-CCNC: 23 U/L (ref 12–78)
ANION GAP SERPL CALCULATED.3IONS-SCNC: 8 MMOL/L (ref 4–13)
AST SERPL W P-5'-P-CCNC: 17 U/L (ref 5–45)
BILIRUB SERPL-MCNC: 0.34 MG/DL (ref 0.2–1)
BUN SERPL-MCNC: 17 MG/DL (ref 5–25)
CALCIUM SERPL-MCNC: 9 MG/DL (ref 8.3–10.1)
CHLORIDE SERPL-SCNC: 102 MMOL/L (ref 100–108)
CO2 SERPL-SCNC: 30 MMOL/L (ref 21–32)
CREAT SERPL-MCNC: 1.05 MG/DL (ref 0.6–1.3)
CRP SERPL QL: 10.5 MG/L
ERYTHROCYTE [DISTWIDTH] IN BLOOD BY AUTOMATED COUNT: 13.5 % (ref 11.6–15.1)
ERYTHROCYTE [SEDIMENTATION RATE] IN BLOOD: 24 MM/HOUR (ref 0–14)
GFR SERPL CREATININE-BSD FRML MDRD: 93 ML/MIN/1.73SQ M
GLUCOSE SERPL-MCNC: 122 MG/DL (ref 65–140)
HCT VFR BLD AUTO: 44.9 % (ref 36.5–49.3)
HGB BLD-MCNC: 14.2 G/DL (ref 12–17)
MCH RBC QN AUTO: 27 PG (ref 26.8–34.3)
MCHC RBC AUTO-ENTMCNC: 31.6 G/DL (ref 31.4–37.4)
MCV RBC AUTO: 85 FL (ref 82–98)
PLATELET # BLD AUTO: 326 THOUSANDS/UL (ref 149–390)
PMV BLD AUTO: 8.9 FL (ref 8.9–12.7)
POTASSIUM SERPL-SCNC: 3.7 MMOL/L (ref 3.5–5.3)
PROT SERPL-MCNC: 7.7 G/DL (ref 6.4–8.2)
RBC # BLD AUTO: 5.26 MILLION/UL (ref 3.88–5.62)
SODIUM SERPL-SCNC: 140 MMOL/L (ref 136–145)
WBC # BLD AUTO: 10.74 THOUSAND/UL (ref 4.31–10.16)

## 2021-10-14 PROCEDURE — 99214 OFFICE O/P EST MOD 30 MIN: CPT | Performed by: INTERNAL MEDICINE

## 2021-10-14 PROCEDURE — 85027 COMPLETE CBC AUTOMATED: CPT | Performed by: INTERNAL MEDICINE

## 2021-10-14 PROCEDURE — 80053 COMPREHEN METABOLIC PANEL: CPT | Performed by: INTERNAL MEDICINE

## 2021-10-14 PROCEDURE — 86140 C-REACTIVE PROTEIN: CPT | Performed by: INTERNAL MEDICINE

## 2021-10-14 PROCEDURE — 85652 RBC SED RATE AUTOMATED: CPT | Performed by: INTERNAL MEDICINE

## 2021-10-14 PROCEDURE — 36415 COLL VENOUS BLD VENIPUNCTURE: CPT | Performed by: INTERNAL MEDICINE

## 2021-10-14 RX ORDER — MELOXICAM 15 MG/1
15 TABLET ORAL DAILY PRN
Qty: 90 TABLET | Refills: 1 | Status: SHIPPED | OUTPATIENT
Start: 2021-10-14

## 2021-10-14 RX ORDER — TRAMADOL HYDROCHLORIDE 50 MG/1
50 TABLET ORAL EVERY 6 HOURS PRN
Status: CANCELLED | OUTPATIENT
Start: 2021-10-14

## 2021-10-14 RX ORDER — CALCIUM CARBONATE 200(500)MG
1 TABLET,CHEWABLE ORAL DAILY
COMMUNITY

## 2021-10-14 RX ORDER — METHOCARBAMOL 500 MG/1
500 TABLET, FILM COATED ORAL
Qty: 30 TABLET | Refills: 3 | Status: SHIPPED | OUTPATIENT
Start: 2021-10-14

## 2021-10-14 RX ORDER — TRAMADOL HYDROCHLORIDE 50 MG/1
50 TABLET ORAL EVERY 12 HOURS PRN
Qty: 60 TABLET | Refills: 2 | Status: SHIPPED | OUTPATIENT
Start: 2021-10-14

## 2021-11-11 ENCOUNTER — TELEPHONE (OUTPATIENT)
Dept: OBGYN CLINIC | Facility: HOSPITAL | Age: 36
End: 2021-11-11

## 2021-11-11 DIAGNOSIS — M45.0 ANKYLOSING SPONDYLITIS OF MULTIPLE SITES IN SPINE (HCC): ICD-10-CM

## 2021-11-11 RX ORDER — ETANERCEPT 50 MG/ML
50 SOLUTION SUBCUTANEOUS
Qty: 12 ML | Refills: 3 | Status: SHIPPED | OUTPATIENT
Start: 2021-11-11

## 2022-02-10 NOTE — PROGRESS NOTES
Assessment and Plan:   Patient is a 81-year-old male who presents for rheumatology follow-up for ankylosing spondylitis  He remains on treatment with Enbrel and reports since last visit he has been more consistent with every week dosing of the Enbrel and has noticed a definite improvement in terms of his pain and stiffness  He still requires NSAIDs on a regular basis and is currently using ibuprofen about 400 mg b i d , which is less than he was previously requiring  He has not yet tried the meloxicam but does have this at home in case he would like to see if this works better for him but he is aware he cannot take both  He also has Robaxin and tramadol to use sparingly as needed and is using these very infrequently  His labs were stable after last visit and we will plan to do labs again before next visit, no labs needed at this time  Plan:  Diagnoses and all orders for this visit:    Ankylosing spondylitis of multiple sites in spine ([facility] [address] )  -     Comprehensive metabolic panel  -     CBC  -     C-reactive protein  -     Sedimentation rate, automated    NSAID long-term use  -     Comprehensive metabolic panel  -     CBC  -     C-reactive protein  -     Sedimentation rate, automated    High risk medication use  -     Comprehensive metabolic panel  -     CBC  -     C-reactive protein  -     Sedimentation rate, automated    Facet arthropathy, multilevel        Follow-up plan: 6 months        Rheumatic Disease Summary  1  Ankylosing spondylitis   Established care- August, 2017- hx of AS dx with LVH in 2006   Did not start treatment at that time although Enbrel was recommended   Then seen by [facility] in 2009 and started on Enbrel with Celebrex, Tramadol, and Flexeril; now taking Duexis  -XR lumbar/SI joints 2017: mild squaring of lumbar vertebra with enthesopathic changes in facet joint and at costotransverse level; erosive changes at B/L SI joints with some surrounding sclerosis suggestive sacroiliitis, moderate left hip narrowing   -MRI pelvis 1/16/19: mild B/L chronic sacroiliitis, no edema, effusions or definite erosions; minimal right mid-SI joint ankylosis; mild new left iliopsoas insertional and right common hamstring tendon origin, presumably active enthesitis vs strain  -Visit 9/16/19: progressive worsening mid and lower back pain, enbrel less effective, also symptoms of costochondritis; prior auth for remicade, cont enbrel until able to start remicade   -XR cervical/thoracic/lumbar 9/2019: facet fusion in lumbar, normal cervical/thoracic   -Started 1st remicade infusion 10/10/19 but got flushed (most likely from steroids given), but patient did not want to proceed and wanted to continue with Enbrel   -Visit 1/17/20: still back pain but sounds non-inflammatory; referred to PT, wants to stay with Enbrel, switched Duexis to Diclofenac  -Visit 9/22/20: retry diclofenac, consider changing to Humira   -Visit 1/15/21: still requiring Duexis everyday, still hesitant to change to humira, reviewed REYES on labs from 9/2020; will repeat labs and f/u in 3 months and consider humira; referred to pain management for secondary OA  -Visit 4/15/21: holding off on injection with PM until able with work; cont enbrel and duexis, again discussed trying to cut down NSAID use   -Visit 10/14/21: prefers to cont with enbrel, try meloxicam, gave supply of tramadol to use sparingly; recheck labs, again discussed switching therapies given inflammation in blood but pt hesitant   -Visit 2/15/22: doing better with more consistent weekly dosing of enbrel, continue   2  Drug Monitoring                                                3  Comorbidities: h/o left labral tear s/p repair     HPI  Aurelio Paniagua is a 31 y o   male who presents for rheumatology follow-up for ankylosing spondylitis  At last visit, patient again prefer to continue with Enbrel    We again discussed the option of switching biologic therapies given persistent systemic inflammation but patient continues to decline  I sent in a prescription for him to try meloxicam and also tramadol to use very sparingly  Patient reports that since last visit, he has been more consistent with his weekly Enbrel dose and has definitely noticed an improvement in his stiffness  He is using ibuprofen 400mg about twice per day on a regular basis  Not really using the muscle relaxer, has not used in several weeks  He never tried the meloxicam as he just stuck with the ibuprofen  He also is rarely needing the tramadol, states he only takes it on occasion and will take half a tablet  No health changes otherwise  We reviewed his blood work from last visit which again show systemic inflammation but is otherwise stable  The following portions of the patient's history were reviewed and updated as appropriate: allergies, current medications, past family history, past medical history, past social history, past surgical history and problem list     Review of Systems:   Review of Systems   Musculoskeletal: Positive for arthralgias  Negative for joint swelling  Skin: Negative for rash         Home Medications:    Current Outpatient Medications:     calcium carbonate (TUMS) 500 mg chewable tablet, Chew 1 tablet daily, Disp: , Rfl:     etanercept (Enbrel) 50 mg/mL SOSY, Inject 1 mL (50 mg total) under the skin every 7 days, Disp: 12 mL, Rfl: 3    ibuprofen (ADVIL,MOTRIN) 100 MG tablet, Take 100 mg by mouth every 6 (six) hours as needed for mild pain, Disp: , Rfl:     meloxicam (Mobic) 15 mg tablet, Take 1 tablet (15 mg total) by mouth daily as needed for mild pain, Disp: 90 tablet, Rfl: 1    methocarbamol (ROBAXIN) 500 mg tablet, Take 1 tablet (500 mg total) by mouth daily at bedtime as needed for muscle spasms, Disp: 30 tablet, Rfl: 3    traMADol (ULTRAM) 50 mg tablet, Take 1 tablet (50 mg total) by mouth every 12 (twelve) hours as needed for moderate pain, Disp: 60 tablet, Rfl: 2    Objective:    Vitals:    02/15/22 1551   Height: 5' 8" (1 727 m)       Physical Exam  Constitutional:       General: He is not in acute distress  HENT:      Head: Normocephalic and atraumatic  Eyes:      Conjunctiva/sclera: Conjunctivae normal    Pulmonary:      Effort: Pulmonary effort is normal  No respiratory distress  Musculoskeletal:      Cervical back: Neck supple  Comments: Increased kyphotic thoracic curve    Skin:     Coloration: Skin is not pale  Neurological:      Mental Status: He is alert  Mental status is at baseline     Psychiatric:         Mood and Affect: Mood normal          Behavior: Behavior normal          Labs:   Component      Latest Ref Rng & Units 10/14/2021   Sodium      136 - 145 mmol/L 140   Potassium      3 5 - 5 3 mmol/L 3 7   Chloride      100 - 108 mmol/L 102   CO2      21 - 32 mmol/L 30   Anion Gap      4 - 13 mmol/L 8   BUN      5 - 25 mg/dL 17   Creatinine      0 60 - 1 30 mg/dL 1 05   Glucose, Random      65 - 140 mg/dL 122   Calcium      8 3 - 10 1 mg/dL 9 0   AST      5 - 45 U/L 17   ALT      12 - 78 U/L 23   Alkaline Phosphatase      46 - 116 U/L 89   Total Protein      6 4 - 8 2 g/dL 7 7   Albumin      3 5 - 5 0 g/dL 3 7   TOTAL BILIRUBIN      0 20 - 1 00 mg/dL 0 34   eGFR      ml/min/1 73sq m 93   WBC      4 31 - 10 16 Thousand/uL 10 74 (H)   Red Blood Cell Count      3 88 - 5 62 Million/uL 5 26   Hemoglobin      12 0 - 17 0 g/dL 14 2   HCT      36 5 - 49 3 % 44 9   MCV      82 - 98 fL 85   MCH      26 8 - 34 3 pg 27 0   MCHC      31 4 - 37 4 g/dL 31 6   RDW      11 6 - 15 1 % 13 5   Platelet Count      346 - 390 Thousands/uL 326   MPV      8 9 - 12 7 fL 8 9   C-REACTIVE PROTEIN      <3 0 mg/L 10 5 (H)   Sed Rate      0 - 14 mm/hour 24 (H)

## 2022-02-15 ENCOUNTER — TELEPHONE (OUTPATIENT)
Dept: RHEUMATOLOGY | Facility: CLINIC | Age: 37
End: 2022-02-15

## 2022-02-15 ENCOUNTER — OFFICE VISIT (OUTPATIENT)
Dept: RHEUMATOLOGY | Facility: CLINIC | Age: 37
End: 2022-02-15
Payer: COMMERCIAL

## 2022-02-15 VITALS — HEIGHT: 68 IN | BODY MASS INDEX: 21.9 KG/M2

## 2022-02-15 DIAGNOSIS — M47.819 FACET ARTHROPATHY, MULTILEVEL: ICD-10-CM

## 2022-02-15 DIAGNOSIS — Z79.899 HIGH RISK MEDICATION USE: ICD-10-CM

## 2022-02-15 DIAGNOSIS — M45.0 ANKYLOSING SPONDYLITIS OF MULTIPLE SITES IN SPINE (HCC): Primary | ICD-10-CM

## 2022-02-15 DIAGNOSIS — Z79.1 NSAID LONG-TERM USE: ICD-10-CM

## 2022-02-15 PROCEDURE — 99214 OFFICE O/P EST MOD 30 MIN: CPT | Performed by: INTERNAL MEDICINE

## 2022-02-15 NOTE — TELEPHONE ENCOUNTER
Patient had letter from [email or web address] at his appt today indicating he will need new enbrel auth after 3/7/22   Thanks

## 2022-02-25 ENCOUNTER — TRANSCRIBE ORDERS (OUTPATIENT)
Dept: PAIN MEDICINE | Facility: CLINIC | Age: 37
End: 2022-02-25

## 2022-07-02 RX ORDER — TADALAFIL 20 MG/1
TABLET ORAL
COMMUNITY
Start: 2021-10-21

## 2022-09-01 DIAGNOSIS — G89.29 CHRONIC BILATERAL LOW BACK PAIN WITHOUT SCIATICA: ICD-10-CM

## 2022-09-01 DIAGNOSIS — M54.50 CHRONIC BILATERAL LOW BACK PAIN WITHOUT SCIATICA: ICD-10-CM

## 2022-09-01 DIAGNOSIS — M45.0 ANKYLOSING SPONDYLITIS OF MULTIPLE SITES IN SPINE (HCC): ICD-10-CM

## 2022-09-01 RX ORDER — TRAMADOL HYDROCHLORIDE 50 MG/1
50 TABLET ORAL EVERY 12 HOURS PRN
Qty: 60 TABLET | Refills: 2 | Status: SHIPPED | OUTPATIENT
Start: 2022-09-01 | End: 2022-10-27 | Stop reason: SDUPTHER

## 2022-09-08 PROBLEM — F31.81 BIPOLAR 2 DISORDER (HCC): Status: ACTIVE | Noted: 2022-09-08

## 2022-09-08 PROBLEM — F98.8 ADD (ATTENTION DEFICIT DISORDER): Status: ACTIVE | Noted: 2022-09-08

## 2022-10-02 DIAGNOSIS — M45.0 ANKYLOSING SPONDYLITIS OF MULTIPLE SITES IN SPINE (HCC): ICD-10-CM

## 2022-10-03 RX ORDER — ETANERCEPT 50 MG/ML
50 SOLUTION SUBCUTANEOUS
Qty: 12 ML | Refills: 3 | Status: SHIPPED | OUTPATIENT
Start: 2022-10-03

## 2022-10-26 NOTE — PROGRESS NOTES
Assessment and Plan:   Patient is a 29-year-old male who presents for rheumatology follow-up for ankylosing spondylitis  He has remained on Enbrel with fairly good control of his disease  He does have secondary damage in his spine that is irreversible but we have continued with treating with the Enbrel to hopefully prevent further spine damage  Also feels better on the Enbrel which can indicate some ongoing disease activity if we were to stop it  We will continue with Enbrel  We discussed there are other treatment options which we do discuss at each visit however he remains hesitant to change his Enbrel  He is still using ibuprofen 400 mg b i d  and I asked him to try to cut down the dosing as he has already been on NSAIDs for many years  He also will try the meloxicam I previously prescribed in place of the ibuprofen  I refilled his tramadol today again for him which he uses sparingly for his back pain  He will get updated blood work today before he leaves  Also requested a referral to colorectal for issues with ongoing hemorrhoids  Plan:  Diagnoses and all orders for this visit:    Gastroesophageal reflux disease without esophagitis  -     famotidine (PEPCID) 20 mg tablet; Take 1 tablet (20 mg total) by mouth daily    Ankylosing spondylitis of multiple sites in spine (HCC)  -     traMADol (ULTRAM) 50 mg tablet; Take 1 tablet (50 mg total) by mouth every 12 (twelve) hours as needed for moderate pain    Chronic bilateral low back pain without sciatica  -     traMADol (ULTRAM) 50 mg tablet; Take 1 tablet (50 mg total) by mouth every 12 (twelve) hours as needed for moderate pain    Other hemorrhoids  -     Ambulatory Referral to Colorectal Surgery; Future        Follow-up plan: 6 months        Rheumatic Disease Summary  1  Ankylosing spondylitis   Established care- August, 2017- hx of AS dx with LVH in 2006   Did not start treatment at that time although Enbrel was recommended   Then seen by [facility] in 2009 and started on Enbrel with Celebrex, Tramadol, and Flexeril; now taking Duexis  -XR lumbar/SI joints 2017: mild squaring of lumbar vertebra with enthesopathic changes in facet joint and at costotransverse level; erosive changes at B/L SI joints with some surrounding sclerosis suggestive sacroiliitis, moderate left hip narrowing   -MRI pelvis 1/16/19: mild B/L chronic sacroiliitis, no edema, effusions or definite erosions; minimal right mid-SI joint ankylosis; mild new left iliopsoas insertional and right common hamstring tendon origin, presumably active enthesitis vs strain  -Visit 9/16/19: progressive worsening mid and lower back pain, enbrel less effective, also symptoms of costochondritis; prior auth for remicade, cont enbrel until able to start remicade   -XR cervical/thoracic/lumbar 9/2019: facet fusion in lumbar, normal cervical/thoracic   -Started 1st remicade infusion 10/10/19 but got flushed (most likely from steroids given), but patient did not want to proceed and wanted to continue with Enbrel   -Visit 1/17/20: still back pain but sounds non-inflammatory; referred to PT, wants to stay with Enbrel, switched Duexis to Diclofenac  -Visit 9/22/20: retry diclofenac, consider changing to Humira   -Visit 1/15/21: still requiring Duexis everyday, still hesitant to change to humira, reviewed REYES on labs from 9/2020; will repeat labs and f/u in 3 months and consider humira; referred to pain management for secondary OA  -Visit 4/15/21: holding off on injection with PM until able with work; cont enbrel and duexis, again discussed trying to cut down NSAID use   -Visit 10/14/21: prefers to cont with enbrel, try meloxicam, gave supply of tramadol to use sparingly; recheck labs, again discussed switching therapies given inflammation in blood but pt hesitant   -Visit 2/15/22: doing better with more consistent weekly dosing of enbrel, continue  -Visit 10/27/22: stable on enbrel, advised to cut down ibuprofen use if possible, using 1/2 tramadol tab sparingly   2  Drug Monitoring                                                3  Comorbidities: h/o left labral tear s/p repair     HPI  Haile Matias is a 31 y o   male who presents for rheumatology follow-up for ankylosing spondylitis  At last visit, patient was doing a bit better after being more consistent with his weekly Enbrel  He was using tramadol very sparingly  Also continues to use ibuprofen  Patient reports he is doing overall the same  No new major changes or concerns  He continues to have his normal amount of back pain and stiffness  He currently is taking over-the-counter ibuprofen 400 mg b i d  and feels this works really well for him  He has been on long-term NSAIDs which we again discussed and it would be best if he could cut these down  He definitely notices a difference if he does not take a dose of the NSAIDs  He remains hesitant to switch his Enbrel since he has done well on this for a long time  Also has not tried the meloxicam I previously prescribed but is open to trying this  He is aware he can not combine this with ibuprofen  He is using the tramadol by taking a half a tablet as needed sparingly  His pharmacy only dispensed 14 tablets as this is what his insurance would cover for him and this prescription lasted him about 2 months  The following portions of the patient's history were reviewed and updated as appropriate: allergies, current medications, past family history, past medical history, past social history, past surgical history and problem list     Review of Systems:   Review of Systems   Constitutional: Positive for fatigue  Musculoskeletal: Positive for back pain  Negative for arthralgias and joint swelling         Home Medications:    Current Outpatient Medications:   •  famotidine (PEPCID) 20 mg tablet, Take 1 tablet (20 mg total) by mouth daily, Disp: 90 tablet, Rfl: 1  •  traMADol (ULTRAM) 50 mg tablet, Take 1 tablet (50 mg total) by mouth every 12 (twelve) hours as needed for moderate pain, Disp: 60 tablet, Rfl: 0  •  calcium carbonate (TUMS) 500 mg chewable tablet, Chew 1 tablet daily, Disp: , Rfl:   •  Enbrel 50 MG/ML SOSY, INJECT 1 ML (50 MG TOTAL) UNDER THE SKIN EVERY 7 DAYS, Disp: 12 mL, Rfl: 3  •  ibuprofen (ADVIL,MOTRIN) 100 MG tablet, Take 100 mg by mouth every 6 (six) hours as needed for mild pain, Disp: , Rfl:   •  meloxicam (Mobic) 15 mg tablet, Take 1 tablet (15 mg total) by mouth daily as needed for mild pain, Disp: 90 tablet, Rfl: 1  •  methocarbamol (ROBAXIN) 500 mg tablet, Take 1 tablet (500 mg total) by mouth daily at bedtime as needed for muscle spasms, Disp: 30 tablet, Rfl: 3    Objective:    Vitals:    10/27/22 1441   BP: 134/78   Pulse: 81   SpO2: 100%   Weight: 68 3 kg (150 lb 9 6 oz)   Height: 5' 8" (1 727 m)       Physical Exam  Constitutional:       General: He is not in acute distress  HENT:      Head: Normocephalic and atraumatic  Eyes:      Conjunctiva/sclera: Conjunctivae normal    Pulmonary:      Effort: Pulmonary effort is normal  No respiratory distress  Musculoskeletal:      Cervical back: Neck supple  Comments: Increased kyphotic thoracic curve    Skin:     Coloration: Skin is not pale  Neurological:      Mental Status: He is alert  Mental status is at baseline     Psychiatric:         Mood and Affect: Mood normal          Behavior: Behavior normal

## 2022-10-27 ENCOUNTER — APPOINTMENT (OUTPATIENT)
Dept: LAB | Facility: CLINIC | Age: 37
End: 2022-10-27
Payer: COMMERCIAL

## 2022-10-27 ENCOUNTER — OFFICE VISIT (OUTPATIENT)
Dept: RHEUMATOLOGY | Facility: CLINIC | Age: 37
End: 2022-10-27
Payer: COMMERCIAL

## 2022-10-27 VITALS
HEART RATE: 81 BPM | BODY MASS INDEX: 22.82 KG/M2 | DIASTOLIC BLOOD PRESSURE: 78 MMHG | HEIGHT: 68 IN | OXYGEN SATURATION: 100 % | WEIGHT: 150.6 LBS | SYSTOLIC BLOOD PRESSURE: 134 MMHG

## 2022-10-27 DIAGNOSIS — K64.8 OTHER HEMORRHOIDS: ICD-10-CM

## 2022-10-27 DIAGNOSIS — G89.29 CHRONIC BILATERAL LOW BACK PAIN WITHOUT SCIATICA: ICD-10-CM

## 2022-10-27 DIAGNOSIS — M45.0 ANKYLOSING SPONDYLITIS OF MULTIPLE SITES IN SPINE (HCC): ICD-10-CM

## 2022-10-27 DIAGNOSIS — K21.9 GASTROESOPHAGEAL REFLUX DISEASE WITHOUT ESOPHAGITIS: Primary | ICD-10-CM

## 2022-10-27 DIAGNOSIS — M54.50 CHRONIC BILATERAL LOW BACK PAIN WITHOUT SCIATICA: ICD-10-CM

## 2022-10-27 LAB
ALBUMIN SERPL BCP-MCNC: 4.2 G/DL (ref 3.5–5)
ALP SERPL-CCNC: 77 U/L (ref 34–104)
ALT SERPL W P-5'-P-CCNC: 13 U/L (ref 7–52)
ANION GAP SERPL CALCULATED.3IONS-SCNC: 7 MMOL/L (ref 4–13)
AST SERPL W P-5'-P-CCNC: 14 U/L (ref 13–39)
BILIRUB SERPL-MCNC: 0.3 MG/DL (ref 0.2–1)
BUN SERPL-MCNC: 20 MG/DL (ref 5–25)
CALCIUM SERPL-MCNC: 9.3 MG/DL (ref 8.4–10.2)
CHLORIDE SERPL-SCNC: 103 MMOL/L (ref 96–108)
CO2 SERPL-SCNC: 29 MMOL/L (ref 21–32)
CREAT SERPL-MCNC: 0.92 MG/DL (ref 0.6–1.3)
CRP SERPL QL: 12.7 MG/L
ERYTHROCYTE [DISTWIDTH] IN BLOOD BY AUTOMATED COUNT: 12.8 % (ref 11.6–15.1)
ERYTHROCYTE [SEDIMENTATION RATE] IN BLOOD: 16 MM/HOUR (ref 0–14)
GFR SERPL CREATININE-BSD FRML MDRD: 108 ML/MIN/1.73SQ M
GLUCOSE SERPL-MCNC: 76 MG/DL (ref 65–140)
HCT VFR BLD AUTO: 46.6 % (ref 36.5–49.3)
HGB BLD-MCNC: 14.8 G/DL (ref 12–17)
MCH RBC QN AUTO: 27 PG (ref 26.8–34.3)
MCHC RBC AUTO-ENTMCNC: 31.8 G/DL (ref 31.4–37.4)
MCV RBC AUTO: 85 FL (ref 82–98)
PLATELET # BLD AUTO: 388 THOUSANDS/UL (ref 149–390)
PMV BLD AUTO: 8.6 FL (ref 8.9–12.7)
POTASSIUM SERPL-SCNC: 3.8 MMOL/L (ref 3.5–5.3)
PROT SERPL-MCNC: 7.4 G/DL (ref 6.4–8.4)
RBC # BLD AUTO: 5.48 MILLION/UL (ref 3.88–5.62)
SODIUM SERPL-SCNC: 139 MMOL/L (ref 135–147)
WBC # BLD AUTO: 8.54 THOUSAND/UL (ref 4.31–10.16)

## 2022-10-27 PROCEDURE — 99214 OFFICE O/P EST MOD 30 MIN: CPT | Performed by: INTERNAL MEDICINE

## 2022-10-27 RX ORDER — TRAMADOL HYDROCHLORIDE 50 MG/1
50 TABLET ORAL EVERY 12 HOURS PRN
Qty: 60 TABLET | Refills: 0 | Status: SHIPPED | OUTPATIENT
Start: 2022-10-27

## 2022-10-27 RX ORDER — FAMOTIDINE 20 MG/1
20 TABLET, FILM COATED ORAL DAILY
Qty: 90 TABLET | Refills: 1 | Status: SHIPPED | OUTPATIENT
Start: 2022-10-27

## 2022-12-01 ENCOUNTER — TELEPHONE (OUTPATIENT)
Dept: OBGYN CLINIC | Facility: MEDICAL CENTER | Age: 37
End: 2022-12-01

## 2022-12-01 DIAGNOSIS — G89.29 CHRONIC BILATERAL LOW BACK PAIN WITHOUT SCIATICA: ICD-10-CM

## 2022-12-01 DIAGNOSIS — M45.0 ANKYLOSING SPONDYLITIS OF MULTIPLE SITES IN SPINE (HCC): ICD-10-CM

## 2022-12-01 DIAGNOSIS — M54.50 CHRONIC BILATERAL LOW BACK PAIN WITHOUT SCIATICA: ICD-10-CM

## 2022-12-01 RX ORDER — MELOXICAM 15 MG/1
15 TABLET ORAL DAILY PRN
Qty: 90 TABLET | Refills: 1 | Status: SHIPPED | OUTPATIENT
Start: 2022-12-01

## 2022-12-01 NOTE — TELEPHONE ENCOUNTER
Pt contacted Call Center requested refill of their medication  Medication Name  Meloxicam      Dosage of Med: 15 mg        Frequency of Med: 1 daily       Remaining Medication: 4 pills left     Pharmacy and Location:  [address] [place] [address]         Pt  Preferred Callback Phone Number: [phone]      Thank you  PLEASE ADVISE PATIENTS:    REFILL REQUESTS WILL BE PROCESSED WITHIN 24-48 HOURS

## 2023-01-20 ENCOUNTER — TELEPHONE (OUTPATIENT)
Dept: RHEUMATOLOGY | Facility: CLINIC | Age: 38
End: 2023-01-20

## 2023-01-23 DIAGNOSIS — M45.0 ANKYLOSING SPONDYLITIS OF MULTIPLE SITES IN SPINE (HCC): ICD-10-CM

## 2023-01-23 RX ORDER — ETANERCEPT 50 MG/ML
50 SOLUTION SUBCUTANEOUS
Qty: 12 ML | Refills: 3 | Status: SHIPPED | OUTPATIENT
Start: 2023-01-23

## 2023-05-16 DIAGNOSIS — K21.9 GASTROESOPHAGEAL REFLUX DISEASE WITHOUT ESOPHAGITIS: ICD-10-CM

## 2023-05-16 NOTE — TELEPHONE ENCOUNTER
Pt contacted Call Center requested refill of their medication  Medication Name: famotidine       Dosage of Med: 20 mg      Frequency of Med: Take 1 tablet (20 mg total) by mouth daily        Pharmacy and Location: [address], [address] [place] [id number]         Pt   Preferred Callback Phone Number: [phone]

## 2023-05-17 RX ORDER — FAMOTIDINE 20 MG/1
20 TABLET, FILM COATED ORAL DAILY
Qty: 90 TABLET | Refills: 1 | Status: SHIPPED | OUTPATIENT
Start: 2023-05-17

## 2023-05-23 DIAGNOSIS — M45.0 ANKYLOSING SPONDYLITIS OF MULTIPLE SITES IN SPINE (HCC): ICD-10-CM

## 2023-05-23 DIAGNOSIS — G89.29 CHRONIC BILATERAL LOW BACK PAIN WITHOUT SCIATICA: ICD-10-CM

## 2023-05-23 DIAGNOSIS — M54.50 CHRONIC BILATERAL LOW BACK PAIN WITHOUT SCIATICA: ICD-10-CM

## 2023-05-24 RX ORDER — MELOXICAM 15 MG/1
TABLET ORAL
Qty: 90 TABLET | Refills: 1 | Status: SHIPPED | OUTPATIENT
Start: 2023-05-24 | End: 2023-05-31 | Stop reason: SDUPTHER

## 2023-05-25 ENCOUNTER — TELEPHONE (OUTPATIENT)
Dept: FAMILY MEDICINE CLINIC | Facility: CLINIC | Age: 38
End: 2023-05-25

## 2023-05-25 NOTE — TELEPHONE ENCOUNTER
Called patient from patient attribution list  Per voicemail message, patient is unavailable  Printed letter to be mailed to patient

## 2023-05-25 NOTE — LETTER
Janet Delaney    Dear Janet Delaney  Records from Insurance indicate that you are a patient of Anirudh Silva, with [facility], Family Medicine [facility] Please call the office to establish care and schedule your annual physical today at [phone]  If you are seeing a primary care provider other than the one assigned to you by your insurance, you can simply call the number on the back of your insurance card to change your primary care physician with your insurance company  We thank you for choosing [address] [place] [address] for your healthcare needs      Sincerely,    [facility]

## 2023-05-26 ENCOUNTER — TELEPHONE (OUTPATIENT)
Dept: OBGYN CLINIC | Facility: HOSPITAL | Age: 38
End: 2023-05-26

## 2023-05-26 NOTE — TELEPHONE ENCOUNTER
Medication Name: Tramadol      Dosage of Med: 50 mg      Frequency of Med: 1 tablet PO q 12 hrs prn for moderate pain             Medication Name: Meloxicam        Dosage of Med: 15 mg      Frequency of Med: 1 tablet PO qd for mild pain      Pharmacy and Location: [facility]        Pt   Preferred Callback Phone Number: [phone]

## 2023-05-31 ENCOUNTER — TELEPHONE (OUTPATIENT)
Dept: RHEUMATOLOGY | Facility: CLINIC | Age: 38
End: 2023-05-31

## 2023-05-31 DIAGNOSIS — M54.50 CHRONIC BILATERAL LOW BACK PAIN WITHOUT SCIATICA: ICD-10-CM

## 2023-05-31 DIAGNOSIS — M45.0 ANKYLOSING SPONDYLITIS OF MULTIPLE SITES IN SPINE (HCC): ICD-10-CM

## 2023-05-31 DIAGNOSIS — G89.29 CHRONIC BILATERAL LOW BACK PAIN WITHOUT SCIATICA: ICD-10-CM

## 2023-05-31 RX ORDER — MELOXICAM 15 MG/1
15 TABLET ORAL DAILY PRN
Qty: 90 TABLET | Refills: 1 | Status: SHIPPED | OUTPATIENT
Start: 2023-05-31

## 2023-05-31 RX ORDER — METHOCARBAMOL 500 MG/1
500 TABLET, FILM COATED ORAL
Qty: 30 TABLET | Refills: 3 | Status: SHIPPED | OUTPATIENT
Start: 2023-05-31

## 2023-05-31 NOTE — TELEPHONE ENCOUNTER
Patient was called, he would also like to have a refill on his methocarbamol  He also would like to know if there are any other medications you could prescribe in place of the tramadol

## 2023-05-31 NOTE — TELEPHONE ENCOUNTER
Caller: Pt    Doctor: Yovany Juarez    Reason for call: Would like to speak with office [facility]   Email was sent     Call back#: [phone]

## 2023-06-06 ENCOUNTER — APPOINTMENT (OUTPATIENT)
Dept: LAB | Facility: CLINIC | Age: 38
End: 2023-06-06
Payer: COMMERCIAL

## 2023-06-06 ENCOUNTER — OFFICE VISIT (OUTPATIENT)
Dept: RHEUMATOLOGY | Facility: CLINIC | Age: 38
End: 2023-06-06
Payer: COMMERCIAL

## 2023-06-06 VITALS
WEIGHT: 150.4 LBS | SYSTOLIC BLOOD PRESSURE: 130 MMHG | DIASTOLIC BLOOD PRESSURE: 80 MMHG | BODY MASS INDEX: 22.79 KG/M2 | HEIGHT: 68 IN

## 2023-06-06 DIAGNOSIS — M45.0 ANKYLOSING SPONDYLITIS OF MULTIPLE SITES IN SPINE (HCC): ICD-10-CM

## 2023-06-06 DIAGNOSIS — M45.0 ANKYLOSING SPONDYLITIS OF MULTIPLE SITES IN SPINE (HCC): Primary | ICD-10-CM

## 2023-06-06 DIAGNOSIS — M54.50 CHRONIC BILATERAL LOW BACK PAIN WITHOUT SCIATICA: ICD-10-CM

## 2023-06-06 DIAGNOSIS — G89.29 CHRONIC BILATERAL LOW BACK PAIN WITHOUT SCIATICA: ICD-10-CM

## 2023-06-06 LAB
ALBUMIN SERPL BCP-MCNC: 4 G/DL (ref 3.5–5)
ALP SERPL-CCNC: 74 U/L (ref 34–104)
ALT SERPL W P-5'-P-CCNC: 14 U/L (ref 7–52)
ANION GAP SERPL CALCULATED.3IONS-SCNC: 4 MMOL/L (ref 4–13)
AST SERPL W P-5'-P-CCNC: 12 U/L (ref 13–39)
BASOPHILS # BLD AUTO: 0.05 THOUSANDS/ÂΜL (ref 0–0.1)
BASOPHILS NFR BLD AUTO: 1 % (ref 0–1)
BILIRUB SERPL-MCNC: 0.28 MG/DL (ref 0.2–1)
BUN SERPL-MCNC: 26 MG/DL (ref 5–25)
CALCIUM SERPL-MCNC: 9.2 MG/DL (ref 8.4–10.2)
CHLORIDE SERPL-SCNC: 103 MMOL/L (ref 96–108)
CO2 SERPL-SCNC: 30 MMOL/L (ref 21–32)
CREAT SERPL-MCNC: 0.93 MG/DL (ref 0.6–1.3)
CRP SERPL QL: 9.6 MG/L
EOSINOPHIL # BLD AUTO: 0.52 THOUSAND/ÂΜL (ref 0–0.61)
EOSINOPHIL NFR BLD AUTO: 5 % (ref 0–6)
ERYTHROCYTE [DISTWIDTH] IN BLOOD BY AUTOMATED COUNT: 13.3 % (ref 11.6–15.1)
ERYTHROCYTE [SEDIMENTATION RATE] IN BLOOD: 22 MM/HOUR (ref 0–14)
GFR SERPL CREATININE-BSD FRML MDRD: 105 ML/MIN/1.73SQ M
GLUCOSE SERPL-MCNC: 94 MG/DL (ref 65–140)
HBV SURFACE AG SER QL: NORMAL
HCT VFR BLD AUTO: 45.4 % (ref 36.5–49.3)
HCV AB SER QL: NORMAL
HGB BLD-MCNC: 14.2 G/DL (ref 12–17)
IMM GRANULOCYTES # BLD AUTO: 0.05 THOUSAND/UL (ref 0–0.2)
IMM GRANULOCYTES NFR BLD AUTO: 1 % (ref 0–2)
LYMPHOCYTES # BLD AUTO: 2.18 THOUSANDS/ÂΜL (ref 0.6–4.47)
LYMPHOCYTES NFR BLD AUTO: 21 % (ref 14–44)
MCH RBC QN AUTO: 26.8 PG (ref 26.8–34.3)
MCHC RBC AUTO-ENTMCNC: 31.3 G/DL (ref 31.4–37.4)
MCV RBC AUTO: 86 FL (ref 82–98)
MONOCYTES # BLD AUTO: 0.92 THOUSAND/ÂΜL (ref 0.17–1.22)
MONOCYTES NFR BLD AUTO: 9 % (ref 4–12)
NEUTROPHILS # BLD AUTO: 6.52 THOUSANDS/ÂΜL (ref 1.85–7.62)
NEUTS SEG NFR BLD AUTO: 63 % (ref 43–75)
NRBC BLD AUTO-RTO: 0 /100 WBCS
PLATELET # BLD AUTO: 357 THOUSANDS/UL (ref 149–390)
PMV BLD AUTO: 8.4 FL (ref 8.9–12.7)
POTASSIUM SERPL-SCNC: 4 MMOL/L (ref 3.5–5.3)
PROT SERPL-MCNC: 7 G/DL (ref 6.4–8.4)
RBC # BLD AUTO: 5.29 MILLION/UL (ref 3.88–5.62)
SODIUM SERPL-SCNC: 137 MMOL/L (ref 135–147)
WBC # BLD AUTO: 10.24 THOUSAND/UL (ref 4.31–10.16)

## 2023-06-06 PROCEDURE — 86140 C-REACTIVE PROTEIN: CPT

## 2023-06-06 PROCEDURE — 99214 OFFICE O/P EST MOD 30 MIN: CPT | Performed by: INTERNAL MEDICINE

## 2023-06-06 PROCEDURE — 80053 COMPREHEN METABOLIC PANEL: CPT

## 2023-06-06 PROCEDURE — 86480 TB TEST CELL IMMUN MEASURE: CPT

## 2023-06-06 PROCEDURE — 85652 RBC SED RATE AUTOMATED: CPT | Performed by: INTERNAL MEDICINE

## 2023-06-06 PROCEDURE — 86803 HEPATITIS C AB TEST: CPT

## 2023-06-06 PROCEDURE — 85025 COMPLETE CBC W/AUTO DIFF WBC: CPT

## 2023-06-06 PROCEDURE — 36415 COLL VENOUS BLD VENIPUNCTURE: CPT

## 2023-06-06 PROCEDURE — 87340 HEPATITIS B SURFACE AG IA: CPT

## 2023-06-06 NOTE — PROGRESS NOTES
Assessment and Plan:   AS--continue Enbrel for now  Repeat labs  If low back pain/stiffness persists then may consider changing to anti-IL 17 tx  NSAIDs PRN back pain  PT LS spine  Anti-inflammatory diet/exercise/weight control  Increase CV fitness/aerobic activity  Patient to continue to monitor symptoms  Topical NSAIDs/analgesics PRN joint pain  Continue to monitor labs including CRP, CBC, CMP every 12 weeks  F/u in 3-6 mos  or sooner if necessary      Rheumatic Disease Summary  As above    Here for f/u visit  Still with low back pain and stiffness  Has been on Enbrel for 20 yrs  No recent labs available for review  The following portions of the patient's history were reviewed and updated as appropriate: allergies, current medications, past family history, past medical history, past social history, past surgical history and problem list     Review of Systems:   Review of Systems   Constitutional: Negative for fatigue  HENT: Negative for mouth sores  Eyes: Negative for pain  Respiratory: Negative for shortness of breath  Cardiovascular: Negative for leg swelling  Musculoskeletal: Positive for back pain  Negative for arthralgias and joint swelling  Skin: Negative for rash  Neurological: Negative for weakness  Hematological: Negative for adenopathy  Psychiatric/Behavioral: Negative for sleep disturbance         Home Medications:    Current Outpatient Medications:   •  calcium carbonate (TUMS) 500 mg chewable tablet, Chew 1 tablet daily, Disp: , Rfl:   •  etanercept (ENBREL SURECLICK) 50 MG/ML injection, Inject 1 mL (50 mg total) under the skin every 7 days, Disp: 12 mL, Rfl: 3  •  famotidine (PEPCID) 20 mg tablet, Take 1 tablet (20 mg total) by mouth daily, Disp: 90 tablet, Rfl: 1  •  meloxicam (MOBIC) 15 mg tablet, Take 1 tablet (15 mg total) by mouth daily as needed (for any joint pain), Disp: 90 tablet, Rfl: 1  •  methocarbamol (ROBAXIN) 500 mg tablet, Take 1 tablet (500 mg total) "by mouth daily at bedtime as needed for muscle spasms, Disp: 30 tablet, Rfl: 3  •  traMADol (ULTRAM) 50 mg tablet, Take 1 tablet (50 mg total) by mouth every 12 (twelve) hours as needed for moderate pain, Disp: 60 tablet, Rfl: 0    Objective:    Vitals:    06/06/23 1450   BP: 130/80   Weight: 68 2 kg (150 lb 6 4 oz)   Height: 5' 8\" (1 727 m)       Physical Exam  Constitutional:       General: He is not in acute distress  HENT:      Head: Normocephalic and atraumatic  Eyes:      Conjunctiva/sclera: Conjunctivae normal    Cardiovascular:      Rate and Rhythm: Normal rate and regular rhythm  Heart sounds: S1 normal and S2 normal       No friction rub  Pulmonary:      Effort: Pulmonary effort is normal  No respiratory distress  Breath sounds: Normal breath sounds  No wheezing, rhonchi or rales  Musculoskeletal:      Cervical back: Neck supple  Lumbar back: Decreased range of motion  Skin:     Coloration: Skin is not pale  Neurological:      Mental Status: He is alert  Mental status is at baseline  Psychiatric:         Mood and Affect: Mood normal          Behavior: Behavior normal          Reviewed labs and imaging  Imaging:   No results found  Labs:   No visits with results within 12 Month(s) from this visit  Latest known visit with results is:   Office Visit on 02/15/2022   Component Date Value Ref Range Status   • Sodium 10/27/2022 139  135 - 147 mmol/L Final   • Potassium 10/27/2022 3 8  3 5 - 5 3 mmol/L Final   • Chloride 10/27/2022 103  96 - 108 mmol/L Final   • CO2 10/27/2022 29  21 - 32 mmol/L Final   • ANION GAP 10/27/2022 7  4 - 13 mmol/L Final   • BUN 10/27/2022 20  5 - 25 mg/dL Final   • Creatinine 10/27/2022 0 92  0 60 - 1 30 mg/dL Final    Standardized to IDMS reference method   • Glucose 10/27/2022 76  65 - 140 mg/dL Final    If the patient is fasting, the ADA then defines impaired fasting glucose as > 100 mg/dL and diabetes as > or equal to 123 mg/dL    Specimen " collection should occur prior to Sulfasalazine administration due to the potential for falsely depressed results  Specimen collection should occur prior to Sulfapyridine administration due to the potential for falsely elevated results  • Calcium 10/27/2022 9 3  8 4 - 10 2 mg/dL Final   • AST 10/27/2022 14  13 - 39 U/L Final    Specimen collection should occur prior to Sulfasalazine administration due to the potential for falsely depressed results  • ALT 10/27/2022 13  7 - 52 U/L Final    Specimen collection should occur prior to Sulfasalazine administration due to the potential for falsely depressed results      • Alkaline Phosphatase 10/27/2022 77  34 - 104 U/L Final   • Total Protein 10/27/2022 7 4  6 4 - 8 4 g/dL Final   • Albumin 10/27/2022 4 2  3 5 - 5 0 g/dL Final   • Total Bilirubin 10/27/2022 0 30  0 20 - 1 00 mg/dL Final   • eGFR 10/27/2022 108  ml/min/1 73sq m Final   • WBC 10/27/2022 8 54  4 31 - 10 16 Thousand/uL Final   • RBC 10/27/2022 5 48  3 88 - 5 62 Million/uL Final   • Hemoglobin 10/27/2022 14 8  12 0 - 17 0 g/dL Final   • Hematocrit 10/27/2022 46 6  36 5 - 49 3 % Final   • MCV 10/27/2022 85  82 - 98 fL Final   • MCH 10/27/2022 27 0  26 8 - 34 3 pg Final   • MCHC 10/27/2022 31 8  31 4 - 37 4 g/dL Final   • RDW 10/27/2022 12 8  11 6 - 15 1 % Final   • Platelets 25/19/5854 388  149 - 390 Thousands/uL Final   • MPV 10/27/2022 8 6 (L)  8 9 - 12 7 fL Final   • CRP 10/27/2022 12 7 (H)  <3 0 mg/L Final   • Sed Rate 10/27/2022 16 (H)  0 - 14 mm/hour Final

## 2023-06-06 NOTE — PATIENT INSTRUCTIONS
Please have your blood work done today  Continue the Enbrel for now  We may consider Cosentyx or Taltz or Darletta Handsome or Rinvoq in the future if the Enbrel stops working effectively  Try to eat a healthy anti-inflammatory diet and exercise daily

## 2023-06-06 NOTE — TELEPHONE ENCOUNTER
Spoke with patient regarding his phone call last week  Explained that typically the physician would want to see a patient prior to filling medications from a previous physician whenever possible, especially in the case of controlled meds  He said he has an appt today and would talk to provider about this, and we would go from there

## 2023-06-08 LAB
GAMMA INTERFERON BACKGROUND BLD IA-ACNC: 0.07 IU/ML
M TB IFN-G BLD-IMP: NEGATIVE
M TB IFN-G CD4+ BCKGRND COR BLD-ACNC: -0.01 IU/ML
M TB IFN-G CD4+ BCKGRND COR BLD-ACNC: -0.02 IU/ML
MITOGEN IGNF BCKGRD COR BLD-ACNC: >10 IU/ML

## 2023-07-17 ENCOUNTER — HOSPITAL ENCOUNTER (EMERGENCY)
Facility: HOSPITAL | Age: 38
Discharge: HOME | End: 2023-07-17
Attending: EMERGENCY MEDICINE
Payer: COMMERCIAL

## 2023-07-17 VITALS
RESPIRATION RATE: 18 BRPM | SYSTOLIC BLOOD PRESSURE: 134 MMHG | OXYGEN SATURATION: 100 % | HEART RATE: 87 BPM | TEMPERATURE: 98.3 F | DIASTOLIC BLOOD PRESSURE: 84 MMHG

## 2023-07-17 DIAGNOSIS — T78.40XA ALLERGIC REACTION, INITIAL ENCOUNTER: Primary | ICD-10-CM

## 2023-07-17 PROCEDURE — 99282 EMERGENCY DEPT VISIT SF MDM: CPT

## 2023-07-17 RX ORDER — PREDNISONE 20 MG/1
40 TABLET ORAL DAILY
Qty: 8 TABLET | Refills: 0 | Status: SHIPPED | OUTPATIENT
Start: 2023-07-18 | End: 2023-07-22

## 2023-07-17 RX ADMIN — PREDNISONE 50 MG: 20 TABLET ORAL at 22:18

## 2023-07-18 NOTE — ED NOTES
Discharge reviewed with patient. Patient verbalized understanding and has no further questions at this time. Patient ambulatory off unit with steady gait.        Shena Cao RN  07/17/23 9717

## 2023-07-18 NOTE — ED PROVIDER NOTES
History  Chief Complaint   Patient presents with   • Allergic Reaction     Pt reports eye swelling, throat tingling, nausea, dizziness that started yesterday after eating a restaurant. Pt took 50mg benadryl around 2000 tonight. Patient is a 42-year-old male seen in the emergency department with concern for throat tingling, intermittent left and right eye swelling, nausea which began 1 day prior to evaluation. Patient states that symptoms began after eating a meal 1 day ago. Patient states that he has had some allergic reactions in the past 2 fruits and possibly other foods. Patient notes no chest pain, shortness of breath, vomiting, weakness. Patient states that he took Benadryl at home with some improvement of symptoms yesterday, but then noted return of some eye swelling and throat tingling today. Patient notes no other definite clear exacerbating or alleviating factors for his symptoms. Prior to Admission Medications   Prescriptions Last Dose Informant Patient Reported?  Taking?   calcium carbonate (TUMS) 500 mg chewable tablet   Yes No   Sig: Chew 1 tablet daily   etanercept (ENBREL SURECLICK) 50 MG/ML injection   No No   Sig: Inject 1 mL (50 mg total) under the skin every 7 days   famotidine (PEPCID) 20 mg tablet   No No   Sig: Take 1 tablet (20 mg total) by mouth daily   meloxicam (MOBIC) 15 mg tablet   No No   Sig: Take 1 tablet (15 mg total) by mouth daily as needed (for any joint pain)   methocarbamol (ROBAXIN) 500 mg tablet   No No   Sig: Take 1 tablet (500 mg total) by mouth daily at bedtime as needed for muscle spasms   traMADol (ULTRAM) 50 mg tablet   No No   Sig: Take 1 tablet (50 mg total) by mouth every 12 (twelve) hours as needed for moderate pain      Facility-Administered Medications: None       Past Medical History:   Diagnosis Date   • Ankylosing spondylitis ([address])    • Arthritis    • Kidney stone        Past Surgical History:   Procedure Laterality Date   • TX ARTHRS HIP DEBRIDEMENT/SHAVING ARTICULAR CRTLG Left 6/1/2017    Procedure: LEFT HIP ARTHROSCOPIC LABRAL DEBRIDEMENT;  Surgeon: Jaqueline Cockayne, MD;  Location: [facility];  Service: Orthopedics   • WISDOM TOOTH EXTRACTION         Family History   Problem Relation Age of Onset   • No Known Problems Mother    • No Known Problems Father    • No Known Problems Maternal Grandmother    • No Known Problems Maternal Grandfather    • No Known Problems Paternal Grandmother    • Skin cancer Paternal Grandfather      I have reviewed and agree with the history as documented. E-Cigarette/Vaping     E-Cigarette/Vaping Substances     Social History     Tobacco Use   • Smoking status: Every Day     Packs/day: 0.50     Years: 15.00     Total pack years: 7.50     Types: Cigarettes   • Smokeless tobacco: Never   Substance Use Topics   • Alcohol use: No   • Drug use: Never       Review of Systems   Constitutional: Negative for chills and fever. HENT: Negative for ear pain and trouble swallowing. Scratchy throat   Eyes: Negative for pain and visual disturbance. Eye swelling   Respiratory: Negative for cough and shortness of breath. Cardiovascular: Negative for chest pain and palpitations. Gastrointestinal: Positive for nausea. Negative for abdominal pain and vomiting. Genitourinary: Negative for decreased urine volume and difficulty urinating. Musculoskeletal: Negative for gait problem and neck stiffness. Skin: Negative for color change and rash. Neurological: Positive for dizziness. Negative for seizures and syncope. Psychiatric/Behavioral: Negative for agitation and confusion. All other systems reviewed and are negative. Physical Exam  Physical Exam  Vitals and nursing note reviewed. Constitutional:       General: He is not in acute distress. Appearance: He is well-developed. HENT:      Head: Normocephalic and atraumatic.       Right Ear: External ear normal.      Left Ear: External ear normal. Nose: Nose normal.      Mouth/Throat:      Mouth: Mucous membranes are moist.      Pharynx: Oropharynx is clear. Eyes:      General: No scleral icterus. Conjunctiva/sclera: Conjunctivae normal.   Cardiovascular:      Rate and Rhythm: Normal rate and regular rhythm. Heart sounds: No murmur heard. Pulmonary:      Effort: Pulmonary effort is normal. No respiratory distress. Breath sounds: Normal breath sounds. Abdominal:      General: There is no distension. Palpations: Abdomen is soft. Tenderness: There is no abdominal tenderness. Musculoskeletal:         General: No swelling, deformity or signs of injury. Cervical back: Normal range of motion and neck supple. Skin:     General: Skin is warm and dry. Findings: Rash present. Neurological:      General: No focal deficit present. Mental Status: He is alert. Cranial Nerves: No cranial nerve deficit. Sensory: No sensory deficit. Psychiatric:         Mood and Affect: Mood normal.         Thought Content: Thought content normal.         Vital Signs  ED Triage Vitals [07/17/23 2147]   Temperature Pulse Respirations Blood Pressure SpO2   98.3 °F (36.8 °C) 87 18 134/84 100 %      Temp Source Heart Rate Source Patient Position - Orthostatic VS BP Location FiO2 (%)   Oral Monitor Sitting Left arm --      Pain Score       --           Vitals:    07/17/23 2147   BP: 134/84   Pulse: 87   Patient Position - Orthostatic VS: Sitting         Visual Acuity      ED Medications  Medications   predniSONE tablet 50 mg (has no administration in time range)       Diagnostic Studies  Results Reviewed     None                 No orders to display              Procedures  Procedures         ED Course                               SBIRT 20yo+    Flowsheet Row Most Recent Value   Initial Alcohol Screen: US AUDIT-C     1. How often do you have a drink containing alcohol? 0 Filed at: 07/17/2023 2147   2.  How many drinks containing alcohol do you have on a typical day you are drinking? 0 Filed at: 07/17/2023 2147   3a. Male UNDER 65: How often do you have five or more drinks on one occasion? 0 Filed at: 07/17/2023 2147   3b. FEMALE Any Age, or MALE 65+: How often do you have 4 or more drinks on one occassion? 0 Filed at: 07/17/2023 2147   Audit-C Score 0 Filed at: 07/17/2023 2147   KANDI: How many times in the past year have you. .. Used an illegal drug or used a prescription medication for non-medical reasons? Never Filed at: 07/17/2023 2147                    Medical Decision Making  Patient is a 19-year-old male seen in the emergency department with concern for apparent allergic reaction, of unclear etiology. Evaluation is not consistent with anaphylaxis or acute infection. Patient was treated with medication for symptom control. Plan to treat patient with course of oral steroids in addition to antihistamine medication(as needed), and have patient follow up with PCP/outpatient providers. Patient declined EpiPen prescription. Patient stable for discharge home. Discharge instructions were reviewed with patient. Allergic reaction, initial encounter: acute illness or injury  Risk  Prescription drug management. Disposition  Final diagnoses: Allergic reaction, initial encounter     Time reflects when diagnosis was documented in both MDM as applicable and the Disposition within this note     Time User Action Codes Description Comment    7/17/2023 10:06 PM Edita Valentino Add [T78.40XA] Allergic reaction, initial encounter       ED Disposition     ED Disposition   Discharge    Condition   Stable    Date/Time   Mon Jul 17, 2023 10:06 PM    Comment   Carlos Girard discharge to home/self care.                Follow-up Information     Follow up With Specialties Details Why Contact Info Additional Information    Your primary doctor  Call in 1 day       [address] Call  As needed [address] [place] [address] [place] [address]  [address] [facility], [place] [place], [address]    [place] [facility] Allergy Call  As needed [facility],  [facility] Natanael Yost [id number] [phone] [place], [facility] [address] [facility] ([facility]          Patient's Medications   Discharge Prescriptions    PREDNISONE 20 MG TABLET    Take 2 tablets (40 mg total) by mouth daily for 4 days Do not start before July 18, 2023.        Start Date: 7/18/2023 End Date: 7/22/2023       Order Dose: 40 mg       Quantity: 8 tablet    Refills: 0           PDMP Review       Value Time User    PDMP Reviewed  Yes 10/27/2022  2:57 PM Josh Ward DO          ED Provider  Electronically Signed by           Cooper Galvez MD  07/17/23 8473

## 2023-10-16 DIAGNOSIS — M45.0 ANKYLOSING SPONDYLITIS OF MULTIPLE SITES IN SPINE (HCC): ICD-10-CM

## 2023-10-16 DIAGNOSIS — G89.29 CHRONIC BILATERAL LOW BACK PAIN WITHOUT SCIATICA: ICD-10-CM

## 2023-10-16 DIAGNOSIS — M54.50 CHRONIC BILATERAL LOW BACK PAIN WITHOUT SCIATICA: ICD-10-CM

## 2023-10-16 RX ORDER — TRAMADOL HYDROCHLORIDE 50 MG/1
50 TABLET ORAL EVERY 12 HOURS PRN
Qty: 60 TABLET | Refills: 0 | OUTPATIENT
Start: 2023-10-16

## 2023-10-16 NOTE — TELEPHONE ENCOUNTER
Reason for call:   [x] Refill   [] Prior Auth  [] Other:     Office:   [] PCP/Provider -   [x] Speciality/Provider - Michelle Barnhart,     Medication:TRAMADOL    Dose/Frequency: 50 MG    Quantity: 60    Pharmacy:   [address] [place] [address]    Does the patient have enough for 3 days?    [] Yes   [x] No - Send as HP to POD

## 2023-12-06 ENCOUNTER — TELEPHONE (OUTPATIENT)
Age: 38
End: 2023-12-06

## 2023-12-06 ENCOUNTER — OFFICE VISIT (OUTPATIENT)
Dept: PAIN MEDICINE | Facility: CLINIC | Age: 38
End: 2023-12-06
Payer: COMMERCIAL

## 2023-12-06 VITALS
WEIGHT: 148 LBS | BODY MASS INDEX: 22.43 KG/M2 | RESPIRATION RATE: 18 BRPM | HEIGHT: 68 IN | DIASTOLIC BLOOD PRESSURE: 76 MMHG | SYSTOLIC BLOOD PRESSURE: 112 MMHG | HEART RATE: 72 BPM

## 2023-12-06 DIAGNOSIS — M54.16 LUMBAR RADICULOPATHY: ICD-10-CM

## 2023-12-06 DIAGNOSIS — G89.29 CHRONIC BILATERAL LOW BACK PAIN WITHOUT SCIATICA: ICD-10-CM

## 2023-12-06 DIAGNOSIS — G89.4 CHRONIC PAIN SYNDROME: Primary | ICD-10-CM

## 2023-12-06 DIAGNOSIS — M45.0 ANKYLOSING SPONDYLITIS OF MULTIPLE SITES IN SPINE (HCC): ICD-10-CM

## 2023-12-06 DIAGNOSIS — M54.50 CHRONIC BILATERAL LOW BACK PAIN WITHOUT SCIATICA: ICD-10-CM

## 2023-12-06 PROCEDURE — 99214 OFFICE O/P EST MOD 30 MIN: CPT

## 2023-12-06 RX ORDER — TRAMADOL HYDROCHLORIDE 50 MG/1
TABLET ORAL
Qty: 30 TABLET | Refills: 2 | Status: SHIPPED | OUTPATIENT
Start: 2023-12-06

## 2023-12-06 NOTE — TELEPHONE ENCOUNTER
PA HAS BEEN SUBMITTED TO PLAN FOR COVERAGE REVIEW    SURE SCRIPTS QUESTIONS ANSWERED    WILL AWAIT PLAN RESPONSE

## 2023-12-06 NOTE — PATIENT INSTRUCTIONS
Opioid Safety   AMBULATORY CARE:   Opioid safety  includes the correct use, storage, and disposal of opioids. Examples of opioid medicines to treat pain include oxycodone, morphine, fentanyl, and codeine. Call your local emergency number (911 in the [place]), or have someone else call if:   You have a seizure. You cannot be woken. You have trouble staying awake and your breathing is slow or shallow. Your speech is slurred, or you are confused. You are dizzy or stumble when you walk. Call your doctor, or have someone close to you call if:   You are extremely drowsy, or you have trouble staying awake or speaking. You have pale or clammy skin. You have blue fingernails or lips. Your heartbeat is slower than normal.    You cannot stop vomiting. You have questions or concerns about your condition or care. Use opioids safely:   Take prescribed opioids exactly as directed. Opioids come with directions based on the kind of opioid and how it is given. Do not take more than the recommended amount, or for longer than needed. Do not give opioids to others or take opioids that belong to someone else. Misuse of opioids can lead to an addiction or overdose. Do not mix opioids with other medicines or alcohol. The combination can cause an overdose, or lead to a coma. Do not drive or operate heavy machinery after you take the opioid. Your provider or pharmacist can tell you how long to wait after a dose before you do these activities. Talk to your healthcare provider if you have any side effects. Your provider can help you prevent or relieve side effects. Side effects include nausea, sleepiness, itching, and trouble thinking clearly. Manage constipation:  Constipation is the most common side effect of opioid medicine. Constipation is when you have hard, dry bowel movements, or you go longer than usual between bowel movements.  Tell your healthcare provider about all changes in your bowel movements while you are taking opioids. Your provider may recommend laxative medicine to help you have a bowel movement. Your provider may also change the kind of opioid you are taking, or change when you take it. The following are more ways you can prevent or relieve constipation:  Drink liquids as directed. You may need to drink extra liquids to help soften and move your bowels. Ask how much liquid to drink each day and which liquids are best for you. Eat high-fiber foods. This may help decrease constipation by adding bulk to your bowel movements. High-fiber foods include fruits, vegetables, whole-grain breads and cereals, and beans. Your healthcare provider or dietitian can help you create a high-fiber meal plan. Your provider may also recommend a fiber supplement if you cannot get enough fiber from food. Exercise regularly. Regular physical activity can help stimulate your intestines. Walking is a good exercise to prevent or relieve constipation. Ask which exercises are best for you. Schedule a time each day to have a bowel movement. This may help train your body to have regular bowel movements. Bend forward while you are on the toilet to help move the bowel movement out. Sit on the toilet for at least 10 minutes, even if you do not have a bowel movement. Store opioids safely:   Store opioids where others cannot easily get them. Keep them in a locked cabinet or secure area. Do not  keep them in a purse or other bag you carry with you. A person may be looking for something else and find the opioids. Make sure opioids are stored out of the reach of children. A child can easily overdose on opioids. Opioids may look like candy to a small child. The best way to dispose of opioids: The laws vary by country and area. In the [facility], the best way is to return the opioids through a take-back program. This program is offered by the [employer] ([employer]).  The following are options for using the program:  Take the opioids to a GERARDO collection site. The site is often a law enforcement center. Call your local law enforcement center for scheduled take-back days in your area. You will be given information on where to go if the collection site is in a different location. Take the opioids to an approved pharmacy or hospital.  A pharmacy or hospital may be set up as a collection site. You will need to ask if it is a GERARDO collection site if you were not directed there. A pharmacy or doctor's office may not be able to take back opioids unless it is a GERARDO site. Use a mail-back system. This means you are given containers to put the opioids into. You will then mail them in the containers. Use a take-back drop box. This is a place to leave the opioids at any time. People and animals will not be able to get into the box. Your local law enforcement agency can tell you where to find a drop box in your area. Other safe ways to dispose of opioids: The medicine may come with disposal instructions. The instructions may vary depending on the brand of medicine you are using. Instructions may come in a Medication Guide, but not every medicine has one. You may instead get instructions from your pharmacy or doctor. Follow instructions carefully. The following are general guidelines to follow:  Find out if you can flush the opioid. Some opioids can be flushed down the toilet or poured into the sink. You will need to contact authorities in your area to see if this is an option for you. The FDA also offers a list of medicines that are safe to flush down the toilet. You can check the list if you cannot get the information for your local area. Ask your waste management company about rules for putting opioids in the trash. The company will be able to give you specific directions. Scratch out personal information on the original medicine label so it cannot be read.  Then put it in the trash. Do not label the trash or put any information on it about the opioids. It should look like regular household trash so no one is tempted to look for the opioids. Keep the trash out of the reach of children and animals. Always make sure trash is secure. Talk to officials if you live in a facility. If you live in a nursing home or assisted living center, talk to an official. The person will know the rules for your area. Other ways to manage pain:   Ask your healthcare provider about non-opioid medicines to control pain. Nonprescription medicines include NSAIDs (such as ibuprofen) and acetaminophen. Prescription medicines include muscle relaxers, antidepressants, and steroids. Pain may be managed without any medicines. Some ways to relieve pain include massage, aromatherapy, or meditation. Physical or occupational therapy may also help. Follow up with your doctor or pain specialist as directed: You may need to have your dose adjusted. Your doctor or pain specialist can also help you find ways to manage pain without opioids. Write down your questions so you remember to ask them during your visits. For more information:   Drug Enforcement Administration  [address] [facility] , [address] [facility]  Phone: 7- 659 - [phone]  Web Address: [facility] Hospital[facility] [employer].. [employer].gov/drug_disposal/    [address] [facility] S and Drug Administration  [address] [facility] , [address] [facility] [address]  Phone: 3- 315 - 899-4701  Web Address: http://[employer]/  © Copyright Delfino De Leon 2023 Information is for End User's use only and may not be sold, redistributed or otherwise used for commercial purposes. The above information is an  only. It is not intended as medical advice for individual conditions or treatments. Talk to your doctor, nurse or pharmacist before following any medical regimen to see if it is safe and effective for you.

## 2023-12-06 NOTE — PROGRESS NOTES
Assessment:  1. Chronic pain syndrome    2. Lumbar radiculopathy    3. Ankylosing spondylitis of multiple sites in spine ([address])    4. Chronic bilateral low back pain without sciatica        Plan:  The patient is a 74-year-old male with a history of chronic pain secondary to low back pain, lumbar intervertebral disc disorder with radiculopathy and ankylosing spondylitis who presents to the office with ongoing bilateral low back pain that is unchanged since his last office visit. At this time, I did instruct patient we can take over his tramadol prescription, as this medication provides him relief without side effects. A prescription for tramadol 50 mg with 2 refills was electronically sent to the patient's pharmacy with a do not fill date of today, 12/6/2023. I did instruct patient at his next office visit, we will obtain a urine drug screen as well as complete an opiate contract. For now, I will get updated x-rays of his lumbar spine for further evaluation. I instructed patient I will call once I receive the results. There are risks associated with opioid medications, including dependence, addiction and tolerance. The patient understands and agrees to use these medications only as prescribed. Potential side effects of the medications include, but are not limited to, constipation, drowsiness, addiction, impaired judgment and risk of fatal overdose if not taken as prescribed. The patient was warned against driving while taking sedation medications. Sharing medications is a felony. At this point in time, the patient is showing no signs of addiction, abuse, diversion or suicidal ideation. [place] Prescription Drug Monitoring Program report was reviewed and was appropriate     My impressions and treatment recommendations were discussed in detail with the patient who verbalized understanding and had no further questions. Discharge instructions were provided.  I personally saw and examined the patient and I agree with the above discussed plan of care. Orders Placed This Encounter   Procedures    XR spine lumbar complete w bending minimum 6 views     Standing Status:   Future     Standing Expiration Date:   12/6/2027     Scheduling Instructions:      Bring along any outside films relating to this procedure. New Medications Ordered This Visit   Medications    traMADol (ULTRAM) 50 mg tablet     Sig: Take 1 tablet daily prn pain     Dispense:  30 tablet     Refill:  2       History of Present Illness:  Karuna Pena is a 30 y.o. male with a history of chronic pain secondary to low back pain, lumbar intervertebral disc disorder with radiculopathy and ankylosing spondylitis. He was last seen on 1/20/2021 where an MRI of his lumbar spine was ordered. He presents to the office with ongoing bilateral low back pain. He states his pain is the same since the last office visit and constant. He rates the quality of his pain as sharp, cramping, pressure-like, shooting and is currently rating his pain an 8/10 on a numeric scale. Current pain medications include tramadol 50 mg half a tablet twice a day which provides him mild to moderate relief of his pain. He was previously prescribed this medication from rheumatology, however the rheumatologist who prescribed this medication for him has left the practice and the provider taking over no longer feels comfortable prescribing tramadol. He has tried several anti-inflammatories, however these provide him GI side effects. I have personally reviewed and/or updated the patient's past medical history, past surgical history, family history, social history, current medications, allergies, and vital signs today. Review of Systems   Respiratory:  Negative for shortness of breath. Cardiovascular:  Negative for chest pain. Gastrointestinal:  Negative for constipation, diarrhea, nausea and vomiting.    Musculoskeletal:  Positive for back pain, gait problem and joint swelling. Negative for arthralgias and myalgias. Skin:  Negative for rash. Neurological:  Negative for dizziness, seizures and weakness. All other systems reviewed and are negative.       Patient Active Problem List   Diagnosis    AS (ankylosing spondylitis) (HCC)    Primary localized osteoarthritis of left hip    NSAID long-term use    Encounter for monitoring of etanercept therapy    Ankylosing spondylitis of multiple sites in spine [facility])       Past Medical History:   Diagnosis Date    Ankylosing spondylitis ([address])     Arthritis     Kidney stone        Past Surgical History:   Procedure Laterality Date    MS ARTHRS HIP DEBRIDEMENT/SHAVING ARTICULAR CRTLG Left 6/1/2017    Procedure: LEFT HIP ARTHROSCOPIC LABRAL DEBRIDEMENT;  Surgeon: George Love MD;  Location: [facility];  Service: Orthopedics    WISDOM TOOTH EXTRACTION         Family History   Problem Relation Age of Onset    No Known Problems Mother     No Known Problems Father     No Known Problems Maternal Grandmother     No Known Problems Maternal Grandfather     No Known Problems Paternal Grandmother     Skin cancer Paternal Grandfather        Social History     Occupational History    Not on file   Tobacco Use    Smoking status: Every Day     Packs/day: 0.50     Years: 15.00     Total pack years: 7.50     Types: Cigarettes    Smokeless tobacco: Never   Substance and Sexual Activity    Alcohol use: No    Drug use: Never    Sexual activity: Not on file       Current Outpatient Medications on File Prior to Visit   Medication Sig    calcium carbonate (TUMS) 500 mg chewable tablet Chew 1 tablet daily    etanercept (ENBREL SURECLICK) 50 MG/ML injection Inject 1 mL (50 mg total) under the skin every 7 days    famotidine (PEPCID) 20 mg tablet take 1 tablet by mouth once daily    meloxicam (MOBIC) 15 mg tablet Take 1 tablet (15 mg total) by mouth daily as needed (for any joint pain)    [DISCONTINUED] traMADol (ULTRAM) 50 mg tablet Take 1 tablet (50 mg total) by mouth every 12 (twelve) hours as needed for moderate pain    methocarbamol (ROBAXIN) 500 mg tablet Take 1 tablet (500 mg total) by mouth daily at bedtime as needed for muscle spasms (Patient not taking: Reported on 12/6/2023)     No current facility-administered medications on file prior to visit. No Known Allergies    Physical Exam:    /76   Pulse 72   Resp 18   Ht 5' 8" (1.727 m)   Wt 67.1 kg (148 lb)   BMI 22.50 kg/m²     Constitutional:normal, well developed, well nourished, alert, in no distress and non-toxic and no overt pain behavior.   Eyes:anicteric  HEENT:grossly intact  Neck:supple, symmetric, trachea midline and no masses   Pulmonary:even and unlabored  Cardiovascular:No edema or pitting edema present  Skin:Normal without rashes or lesions and well hydrated  Psychiatric:Mood and affect appropriate  Neurologic:Cranial Nerves II-XII grossly intact  Musculoskeletal:normal    Imaging

## 2023-12-18 DIAGNOSIS — G89.29 CHRONIC BILATERAL LOW BACK PAIN WITHOUT SCIATICA: ICD-10-CM

## 2023-12-18 DIAGNOSIS — M45.0 ANKYLOSING SPONDYLITIS OF MULTIPLE SITES IN SPINE (HCC): ICD-10-CM

## 2023-12-18 DIAGNOSIS — M54.50 CHRONIC BILATERAL LOW BACK PAIN WITHOUT SCIATICA: ICD-10-CM

## 2023-12-18 RX ORDER — MELOXICAM 15 MG/1
TABLET ORAL
Qty: 90 TABLET | Refills: 1 | Status: SHIPPED | OUTPATIENT
Start: 2023-12-18

## 2024-01-19 ENCOUNTER — HOSPITAL ENCOUNTER (OUTPATIENT)
Dept: RADIOLOGY | Facility: HOSPITAL | Age: 39
Discharge: HOME | End: 2024-01-19
Payer: COMMERCIAL

## 2024-01-19 ENCOUNTER — OFFICE VISIT (OUTPATIENT)
Dept: RHEUMATOLOGY | Facility: CLINIC | Age: 39
End: 2024-01-19
Payer: COMMERCIAL

## 2024-01-19 ENCOUNTER — APPOINTMENT (OUTPATIENT)
Dept: LAB | Facility: CLINIC | Age: 39
End: 2024-01-19
Payer: COMMERCIAL

## 2024-01-19 VITALS
SYSTOLIC BLOOD PRESSURE: 134 MMHG | WEIGHT: 142 LBS | DIASTOLIC BLOOD PRESSURE: 84 MMHG | HEIGHT: 68 IN | BODY MASS INDEX: 21.52 KG/M2

## 2024-01-19 DIAGNOSIS — M45.0 ANKYLOSING SPONDYLITIS OF MULTIPLE SITES IN SPINE (HCC): Primary | ICD-10-CM

## 2024-01-19 DIAGNOSIS — M45.0 ANKYLOSING SPONDYLITIS OF MULTIPLE SITES IN SPINE (HCC): ICD-10-CM

## 2024-01-19 DIAGNOSIS — Z79.60 LONG-TERM USE OF IMMUNOSUPPRESSANT MEDICATION: ICD-10-CM

## 2024-01-19 DIAGNOSIS — Z79.1 ENCOUNTER FOR LONG-TERM (CURRENT) USE OF NSAIDS: ICD-10-CM

## 2024-01-19 LAB
ALBUMIN SERPL BCP-MCNC: 4 G/DL (ref 3.5–5)
ALP SERPL-CCNC: 75 U/L (ref 34–104)
ALT SERPL W P-5'-P-CCNC: 13 U/L (ref 7–52)
ANION GAP SERPL CALCULATED.3IONS-SCNC: 7 MMOL/L
AST SERPL W P-5'-P-CCNC: 13 U/L (ref 13–39)
BASOPHILS # BLD AUTO: 0.03 THOUSANDS/ÂΜL (ref 0–0.1)
BASOPHILS NFR BLD AUTO: 0 % (ref 0–1)
BILIRUB SERPL-MCNC: 0.39 MG/DL (ref 0.2–1)
BUN SERPL-MCNC: 13 MG/DL (ref 5–25)
CALCIUM SERPL-MCNC: 9.3 MG/DL (ref 8.4–10.2)
CHLORIDE SERPL-SCNC: 101 MMOL/L (ref 96–108)
CO2 SERPL-SCNC: 30 MMOL/L (ref 21–32)
CREAT SERPL-MCNC: 0.8 MG/DL (ref 0.6–1.3)
CRP SERPL QL: 79.6 MG/L
EOSINOPHIL # BLD AUTO: 0.18 THOUSAND/ÂΜL (ref 0–0.61)
EOSINOPHIL NFR BLD AUTO: 2 % (ref 0–6)
ERYTHROCYTE [DISTWIDTH] IN BLOOD BY AUTOMATED COUNT: 12.8 % (ref 11.6–15.1)
ERYTHROCYTE [SEDIMENTATION RATE] IN BLOOD: 35 MM/HOUR (ref 0–14)
GFR SERPL CREATININE-BSD FRML MDRD: 114 ML/MIN/1.73SQ M
GLUCOSE P FAST SERPL-MCNC: 86 MG/DL (ref 65–99)
HCT VFR BLD AUTO: 46 % (ref 36.5–49.3)
HGB BLD-MCNC: 14.4 G/DL (ref 12–17)
IMM GRANULOCYTES # BLD AUTO: 0.05 THOUSAND/UL (ref 0–0.2)
IMM GRANULOCYTES NFR BLD AUTO: 1 % (ref 0–2)
LYMPHOCYTES # BLD AUTO: 1.87 THOUSANDS/ÂΜL (ref 0.6–4.47)
LYMPHOCYTES NFR BLD AUTO: 20 % (ref 14–44)
MCH RBC QN AUTO: 25.9 PG (ref 26.8–34.3)
MCHC RBC AUTO-ENTMCNC: 31.3 G/DL (ref 31.4–37.4)
MCV RBC AUTO: 83 FL (ref 82–98)
MONOCYTES # BLD AUTO: 0.85 THOUSAND/ÂΜL (ref 0.17–1.22)
MONOCYTES NFR BLD AUTO: 9 % (ref 4–12)
NEUTROPHILS # BLD AUTO: 6.22 THOUSANDS/ÂΜL (ref 1.85–7.62)
NEUTS SEG NFR BLD AUTO: 68 % (ref 43–75)
NRBC BLD AUTO-RTO: 0 /100 WBCS
PLATELET # BLD AUTO: 432 THOUSANDS/UL (ref 149–390)
PMV BLD AUTO: 8.2 FL (ref 8.9–12.7)
POTASSIUM SERPL-SCNC: 3.9 MMOL/L (ref 3.5–5.3)
PROT SERPL-MCNC: 7.6 G/DL (ref 6.4–8.4)
RBC # BLD AUTO: 5.55 MILLION/UL (ref 3.88–5.62)
SODIUM SERPL-SCNC: 138 MMOL/L (ref 135–147)
WBC # BLD AUTO: 9.2 THOUSAND/UL (ref 4.31–10.16)

## 2024-01-19 PROCEDURE — 36415 COLL VENOUS BLD VENIPUNCTURE: CPT

## 2024-01-19 PROCEDURE — 85652 RBC SED RATE AUTOMATED: CPT

## 2024-01-19 PROCEDURE — 99244 OFF/OP CNSLTJ NEW/EST MOD 40: CPT | Performed by: STUDENT IN AN ORGANIZED HEALTH CARE EDUCATION/TRAINING PROGRAM

## 2024-01-19 PROCEDURE — 80053 COMPREHEN METABOLIC PANEL: CPT

## 2024-01-19 PROCEDURE — 72114 X-RAY EXAM L-S SPINE BENDING: CPT

## 2024-01-19 PROCEDURE — 86140 C-REACTIVE PROTEIN: CPT

## 2024-01-19 PROCEDURE — 85025 COMPLETE CBC W/AUTO DIFF WBC: CPT

## 2024-01-19 NOTE — PROGRESS NOTES
Rheumatology Follow-up Visit  1/19/2024     CC: routine follow up     Permanent History: AS since his 20s on Enbrel for about 10 years; tried switching to Remicaide in 2020 due to concern for waning efficacy of Enbrel but developed burning sensation with each infusion so decided to switch back to Enbrel. Also on chronic NSAIDs    Assessment: 38 y.o. male here for/with the above.    Surprisingly not having flares of his disease with being off of Enbrel for 6-8 weeks. Well controlled with low dose daily tramadol and daily meloxicam alone    Discussed rationale for DMARD treatment, he is understandably sick of injections and is interested in an oral option. Briefly discussed Rinvoq    Discussed that given how long his disease has been going on, the fact that it hasn't flared yet being off the medication may mean it's inactive and it may not be unreasonable to try deescalating. Will check labs and see where they're at    Encounter Diagnosis     ICD-10-CM    1. Ankylosing spondylitis of multiple sites in spine (HCC)  M45.0 Comprehensive metabolic panel     CBC and differential     Sedimentation rate, automated     C-reactive protein     Ambulatory Referral to Physical Therapy      2. Long-term use of immunosuppressant medication  Z79.60       3. Encounter for long-term (current) use of NSAIDs  Z79.1           Plan:  -As above  -Will try stopping meloxicam after labs today and see how his symptoms are  -RTC 6 weeks to decide next step based on response to cutting out NSAID    Cam Emerson DO, TOO Emerson DO, TOO  [facility]      Interval History: Stopped taking Enbrel about 2 months ago. Takes half a tramadol at 10 AM, and meloxicam at night and seems to be okay on this. Has been doing a lot of stretching which has been helping his back a lot.    No uveitis, dactylitis    Past medical history, allergies, and family history reviewed. Active medications and social history as below.     Outpatient  "Medications Marked as Taking for the 1/19/24 encounter (Office Visit) with Cam Idania,    Medication    calcium carbonate (TUMS) 500 mg chewable tablet    famotidine (PEPCID) 20 mg tablet    meloxicam (MOBIC) 15 mg tablet    traMADol (ULTRAM) 50 mg tablet       Social History     Tobacco Use    Smoking status: Every Day     Current packs/day: 0.50     Average packs/day: 0.5 packs/day for 15.0 years (7.5 ttl pk-yrs)     Types: Cigarettes    Smokeless tobacco: Never   Substance Use Topics    Alcohol use: No    Drug use: Never       Review of Systems:  Pertinent findings documented in HPI  ___________________________________    Physical Exam:    /84   Ht 5' 8\" (1.727 m)   Wt 64.4 kg (142 lb)   BMI 21.59 kg/m²     General: Well appearing, in no distress.   Eyes: Sclera non-icteric. EOMI  HENT: No oral ulcers. MMM.   Extremities: Warm, well perfused, no edema.   Neuro: Alert and oriented. No gross focal neurological deficits.   Skin: No rashes.  MSK exam: hyperkyphosis of spine, grossly pos Schober. No tenderness to palpation or synovitis any peripheral joint or spine or SIJ bilateral   ____________________________    Lab Results: relevant labs reviewed    Imaging Results: relevant images reviewed  "

## 2024-01-23 ENCOUNTER — TELEPHONE (OUTPATIENT)
Dept: PAIN MEDICINE | Facility: CLINIC | Age: 39
End: 2024-01-23

## 2024-01-23 NOTE — TELEPHONE ENCOUNTER
MARIO Beck  [facility]  Please call patient with x-ray of lumbar spine showing diffuse mild degenerative changes and no abnormal motion on flexion and extension.

## 2024-01-27 DIAGNOSIS — K21.9 GASTROESOPHAGEAL REFLUX DISEASE WITHOUT ESOPHAGITIS: ICD-10-CM

## 2024-01-29 ENCOUNTER — TELEPHONE (OUTPATIENT)
Dept: RHEUMATOLOGY | Facility: CLINIC | Age: 39
End: 2024-01-29

## 2024-01-29 DIAGNOSIS — M45.0 ANKYLOSING SPONDYLITIS OF MULTIPLE SITES IN SPINE (HCC): Primary | ICD-10-CM

## 2024-01-29 RX ORDER — FAMOTIDINE 20 MG/1
20 TABLET, FILM COATED ORAL DAILY
Qty: 90 TABLET | Refills: 0 | Status: SHIPPED | OUTPATIENT
Start: 2024-01-29

## 2024-01-29 NOTE — TELEPHONE ENCOUNTER
Tried calling with lab results, no response, VM not set up so cannot leave message, no MyChart so cannot send message    Markedly elevated CRP, with having had no significant inflammatory joint symptoms at the time of getting the test, do not think is likely to be related to his AS. Not sure why it is so high    Will let PCP know in case he gets in touch with her for anything; I have also reordered a CRP and ESR for him to get rechecked to document consistency in the elevation

## 2024-01-31 ENCOUNTER — TELEPHONE (OUTPATIENT)
Dept: FAMILY MEDICINE CLINIC | Facility: CLINIC | Age: 39
End: 2024-01-31

## 2024-01-31 NOTE — TELEPHONE ENCOUNTER
Please call pt and 1) ask him to call and f/u with rheumatology who is trying to get him.  Also, he hasn't been seen here in many years.  If he is still a patient here, he should schedule a physical when able

## 2024-02-01 NOTE — TELEPHONE ENCOUNTER
I spoke with patient and scheduled Physical for 2/14/24. Patient was reluctant because he feels rheumatology meets his needs. Explained the importance of preventative exams. Advised patient to contact rheumatology regarding test results. Patient will follow up.

## 2024-02-05 NOTE — TELEPHONE ENCOUNTER
Notified patient of labs and to go get new labs completed.  No further questions for nurse, just stated he still wants to speak to Dr. Emerson [phone].

## 2024-02-06 NOTE — TELEPHONE ENCOUNTER
Talked to patient    Had to restart his Enbrel    Was feeling inflamed all over, feeling better since restarting Enbrel, back to his normal routine    Still taking meloxicam daily    Will get his ESR/CRP rechecked in about two weeks, hoping that after back on Enbrel for some time will see CRP and ESR come down    At next visit will discuss whether to switch to Rinvoq    Fransisco expressed understanding and all questions were answered to their satisfaction.

## 2024-02-07 ENCOUNTER — RA CDI HCC (OUTPATIENT)
Dept: OTHER | Facility: HOSPITAL | Age: 39
End: 2024-02-07

## 2024-02-07 PROBLEM — Z79.620 ENCOUNTER FOR MONITORING OF ETANERCEPT THERAPY: Status: RESOLVED | Noted: 2019-01-07 | Resolved: 2024-02-07

## 2024-02-07 PROBLEM — Z51.81 ENCOUNTER FOR MONITORING OF ETANERCEPT THERAPY: Status: RESOLVED | Noted: 2019-01-07 | Resolved: 2024-02-07

## 2024-02-07 PROBLEM — M45.9 AS (ANKYLOSING SPONDYLITIS) (HCC): Chronic | Status: RESOLVED | Noted: 2017-07-21 | Resolved: 2024-02-07

## 2024-02-07 NOTE — PROGRESS NOTES
HCC coding opportunities       Chart reviewed, no opportunity found: CHART REVIEWED, NO OPPORTUNITY FOUND   This is a reminder to address (resolve/update/assess) ALL HCC (risk adjustment) codes as found on active problem list for 2024 as patient scores reset to zero YOAV.  Also, just a reminder to please review and assess all other chronic conditions for 2024     Patients Insurance        Commercial Insurance: [employer] Commercial [employer]

## 2024-02-14 ENCOUNTER — OFFICE VISIT (OUTPATIENT)
Dept: FAMILY MEDICINE CLINIC | Facility: CLINIC | Age: 39
End: 2024-02-14
Payer: COMMERCIAL

## 2024-02-14 VITALS
HEART RATE: 84 BPM | HEIGHT: 68 IN | BODY MASS INDEX: 21.98 KG/M2 | DIASTOLIC BLOOD PRESSURE: 74 MMHG | WEIGHT: 145 LBS | SYSTOLIC BLOOD PRESSURE: 120 MMHG | OXYGEN SATURATION: 97 % | RESPIRATION RATE: 16 BRPM

## 2024-02-14 DIAGNOSIS — K64.9 HEMORRHOIDS, UNSPECIFIED HEMORRHOID TYPE: ICD-10-CM

## 2024-02-14 DIAGNOSIS — M45.0 ANKYLOSING SPONDYLITIS OF MULTIPLE SITES IN SPINE (HCC): ICD-10-CM
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
## 2024-02-14 DIAGNOSIS — E55.9 VITAMIN D DEFICIENCY: ICD-10-CM

## 2024-02-14 DIAGNOSIS — Z00.00 PHYSICAL EXAM, ANNUAL: Primary | ICD-10-CM

## 2024-02-14 DIAGNOSIS — R53.83 OTHER FATIGUE: ICD-10-CM

## 2024-02-14 PROCEDURE — 99395 PREV VISIT EST AGE 18-39: CPT | Performed by: FAMILY MEDICINE

## 2024-02-14 PROCEDURE — 99214 OFFICE O/P EST MOD 30 MIN: CPT | Performed by: FAMILY MEDICINE

## 2024-02-14 NOTE — PROGRESS NOTES
Name: Fransisco Ricardo      : 1985      MRN: [id number]  Encounter Provider: Yanira Ibrahim DO  Encounter Date: 2024   Encounter department: [facility]    Assessment & Plan     1. Physical exam, annual  Comments:  obtain labs  declines flu shot and prevnar  healthy diet and exercise  Orders:  -     Lipid panel; Future  -     TSH, 3rd generation; Future  -     CBC and differential; Future    2. Tick bite, unspecified site, initial encounter  -     Lyme Total AB W Reflex to IGM/IGG; Future    3. Vitamin D deficiency  -     Vitamin D 25 hydroxy; Future    4. Other fatigue  -     Vitamin B12; Future    5. Hemorrhoids, unspecified hemorrhoid type  -     Ambulatory Referral to Colorectal Surgery; Future    6. Ankylosing spondylitis of multiple sites in spine (HCC)  Comments:  continue f/u with rheum           Subjective      HPI  Pt presents for physical and problems    Preventively, pt due for flu shot, prevnar.  Active with work but some activity limited by ankylosing spondylitis.  Due for lipids.    From a problem standpoint, pt often hunts.  Has a lot of tick bite hx and would like lyme testing    Notes some fatigue.      Pt has what he thinks are hemorrhoids/fissue.  No bleeding but does have pain if he sits a long time.  Dad had hx of polyps.    Follows with rheum for ankylosing spondylitis    Review of Systems   Constitutional:  Positive for fatigue. Negative for chills, fever and unexpected weight change.   HENT:  Negative for congestion, ear pain, hearing loss, postnasal drip, rhinorrhea, sinus pressure, sinus pain, sore throat, trouble swallowing and voice change.    Eyes:  Negative for pain, redness and visual disturbance.   Respiratory:  Negative for cough and shortness of breath.    Cardiovascular:  Negative for chest pain, palpitations and leg swelling.   Gastrointestinal:  Positive for rectal pain. Negative for abdominal pain, constipation, diarrhea and nausea.   Endocrine:  "Negative for cold intolerance, heat intolerance, polydipsia and polyuria.   Genitourinary:  Negative for dysuria, frequency and urgency.   Musculoskeletal:  Positive for arthralgias and back pain. Negative for joint swelling and myalgias.   Skin:  Negative for rash.        No suspicious lesions   Neurological:  Negative for weakness, numbness and headaches.   Hematological:  Negative for adenopathy.       Current Outpatient Medications on File Prior to Visit   Medication Sig    calcium carbonate (TUMS) 500 mg chewable tablet Chew 1 tablet daily    etanercept (ENBREL SURECLICK) 50 MG/ML injection Inject 1 mL (50 mg total) under the skin every 7 days    famotidine (PEPCID) 20 mg tablet take 1 tablet by mouth once daily    meloxicam (MOBIC) 15 mg tablet take 1 tablet by mouth once daily AS NEEDED FOR ANY JOINT PAIN (Patient taking differently: Take 15 mg by mouth daily at bedtime)    methocarbamol (ROBAXIN) 500 mg tablet Take 1 tablet (500 mg total) by mouth daily at bedtime as needed for muscle spasms    traMADol (ULTRAM) 50 mg tablet Take 1 tablet daily prn pain (Patient taking differently: Take 50 mg by mouth in the morning Take 1 tablet daily prn pain)       Objective     /74   Pulse 84   Resp 16   Ht 5' 8\" (1.727 m)   Wt 65.8 kg (145 lb)   SpO2 97%   BMI 22.05 kg/m²     Physical Exam  Constitutional:       General: He is not in acute distress.     Appearance: Normal appearance. He is well-developed.   HENT:      Head: Normocephalic and atraumatic.      Right Ear: Tympanic membrane, ear canal and external ear normal.      Left Ear: Tympanic membrane, ear canal and external ear normal.      Nose: Nose normal.      Mouth/Throat:      Mouth: Mucous membranes are moist.      Pharynx: Oropharynx is clear. No posterior oropharyngeal erythema.   Eyes:      Extraocular Movements: Extraocular movements intact.      Conjunctiva/sclera: Conjunctivae normal.      Pupils: Pupils are equal, round, and reactive to " light.   Neck:      Thyroid: No thyromegaly.      Vascular: No carotid bruit or JVD.   Cardiovascular:      Rate and Rhythm: Normal rate and regular rhythm.      Heart sounds: S1 normal and S2 normal. No murmur heard.     No friction rub. No gallop. No S3 or S4 sounds.   Pulmonary:      Effort: Pulmonary effort is normal.      Breath sounds: Normal breath sounds. No wheezing, rhonchi or rales.   Abdominal:      General: Bowel sounds are normal. There is no distension.      Palpations: Abdomen is soft.      Tenderness: There is no abdominal tenderness.   Musculoskeletal:      Cervical back: Neck supple.   Lymphadenopathy:      Cervical: No cervical adenopathy.   Neurological:      General: No focal deficit present.      Mental Status: He is alert and oriented to person, place, and time.      Cranial Nerves: No cranial nerve deficit.      Sensory: No sensory deficit.      Deep Tendon Reflexes: Reflexes are normal and symmetric. Reflexes normal.       Yanira Ibrahim DO

## 2024-02-16 ENCOUNTER — APPOINTMENT (OUTPATIENT)
Dept: LAB | Facility: CLINIC | Age: 39
End: 2024-02-16
Payer: COMMERCIAL

## 2024-02-16 DIAGNOSIS — M45.0 ANKYLOSING SPONDYLITIS OF MULTIPLE SITES IN SPINE (HCC): ICD-10-CM
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
## 2024-02-16 DIAGNOSIS — E55.9 VITAMIN D DEFICIENCY: ICD-10-CM

## 2024-02-16 DIAGNOSIS — R53.83 OTHER FATIGUE: ICD-10-CM

## 2024-02-16 DIAGNOSIS — E53.8 B12 DEFICIENCY: ICD-10-CM

## 2024-02-16 DIAGNOSIS — Z00.00 PHYSICAL EXAM, ANNUAL: ICD-10-CM

## 2024-02-16 DIAGNOSIS — E55.9 VITAMIN D DEFICIENCY: Primary | ICD-10-CM

## 2024-02-16 LAB
25(OH)D3 SERPL-MCNC: 14 NG/ML (ref 30–100)
B BURGDOR IGG+IGM SER QL IA: NEGATIVE
BASOPHILS # BLD AUTO: 0.05 THOUSANDS/ÂΜL (ref 0–0.1)
BASOPHILS NFR BLD AUTO: 1 % (ref 0–1)
CHOLEST SERPL-MCNC: 130 MG/DL
CRP SERPL QL: 8.4 MG/L
EOSINOPHIL # BLD AUTO: 0.38 THOUSAND/ÂΜL (ref 0–0.61)
EOSINOPHIL NFR BLD AUTO: 5 % (ref 0–6)
ERYTHROCYTE [DISTWIDTH] IN BLOOD BY AUTOMATED COUNT: 14 % (ref 11.6–15.1)
ERYTHROCYTE [SEDIMENTATION RATE] IN BLOOD: 31 MM/HOUR (ref 0–14)
HCT VFR BLD AUTO: 44 % (ref 36.5–49.3)
HDLC SERPL-MCNC: 35 MG/DL
HGB BLD-MCNC: 13.6 G/DL (ref 12–17)
IMM GRANULOCYTES # BLD AUTO: 0.03 THOUSAND/UL (ref 0–0.2)
IMM GRANULOCYTES NFR BLD AUTO: 0 % (ref 0–2)
LDLC SERPL CALC-MCNC: 83 MG/DL (ref 0–100)
LYMPHOCYTES # BLD AUTO: 1.72 THOUSANDS/ÂΜL (ref 0.6–4.47)
LYMPHOCYTES NFR BLD AUTO: 23 % (ref 14–44)
MCH RBC QN AUTO: 25.7 PG (ref 26.8–34.3)
MCHC RBC AUTO-ENTMCNC: 30.9 G/DL (ref 31.4–37.4)
MCV RBC AUTO: 83 FL (ref 82–98)
MONOCYTES # BLD AUTO: 0.69 THOUSAND/ÂΜL (ref 0.17–1.22)
MONOCYTES NFR BLD AUTO: 9 % (ref 4–12)
NEUTROPHILS # BLD AUTO: 4.56 THOUSANDS/ÂΜL (ref 1.85–7.62)
NEUTS SEG NFR BLD AUTO: 62 % (ref 43–75)
NONHDLC SERPL-MCNC: 95 MG/DL
NRBC BLD AUTO-RTO: 0 /100 WBCS
PLATELET # BLD AUTO: 356 THOUSANDS/UL (ref 149–390)
PMV BLD AUTO: 8.5 FL (ref 8.9–12.7)
RBC # BLD AUTO: 5.3 MILLION/UL (ref 3.88–5.62)
TRIGL SERPL-MCNC: 58 MG/DL
TSH SERPL DL<=0.05 MIU/L-ACNC: 1.68 UIU/ML (ref 0.45–4.5)
VIT B12 SERPL-MCNC: 325 PG/ML (ref 180–914)
WBC # BLD AUTO: 7.43 THOUSAND/UL (ref 4.31–10.16)

## 2024-02-16 PROCEDURE — 80061 LIPID PANEL: CPT

## 2024-02-16 PROCEDURE — 84443 ASSAY THYROID STIM HORMONE: CPT

## 2024-02-16 PROCEDURE — 85025 COMPLETE CBC W/AUTO DIFF WBC: CPT

## 2024-02-16 PROCEDURE — 86618 LYME DISEASE ANTIBODY: CPT

## 2024-02-16 PROCEDURE — 86140 C-REACTIVE PROTEIN: CPT

## 2024-02-16 PROCEDURE — 82306 VITAMIN D 25 HYDROXY: CPT

## 2024-02-16 PROCEDURE — 85652 RBC SED RATE AUTOMATED: CPT

## 2024-02-16 PROCEDURE — 82607 VITAMIN B-12: CPT

## 2024-02-16 PROCEDURE — 36415 COLL VENOUS BLD VENIPUNCTURE: CPT

## 2024-02-16 RX ORDER — ERGOCALCIFEROL 1.25 MG/1
50000 CAPSULE ORAL WEEKLY
Qty: 8 CAPSULE | Refills: 0 | Status: SHIPPED | OUTPATIENT
Start: 2024-02-16

## 2024-02-19 ENCOUNTER — TELEPHONE (OUTPATIENT)
Age: 39
End: 2024-02-19

## 2024-02-20 NOTE — RESULT ENCOUNTER NOTE
Spoke to patient, he understood all directions, picked up the medication and will have the labs drawn in 8 weeks from start of medication.  He had no further questions at this time and will call back to schedule a follow up appointment.

## 2024-02-23 ENCOUNTER — TELEPHONE (OUTPATIENT)
Dept: RHEUMATOLOGY | Facility: CLINIC | Age: 39
End: 2024-02-23

## 2024-02-23 NOTE — TELEPHONE ENCOUNTER
"----- Message from Cam Emerson DO sent at 2/23/2024  8:32 AM EST -----  Please call with results    \"ESR stable, CRP drastically improved, likely his elevated CRP was from being off of Enbrel     Please call him with results, I am seeing him soon and we will discuss whether to change or continue current therapy\"    "

## 2024-02-28 ENCOUNTER — OFFICE VISIT (OUTPATIENT)
Dept: PAIN MEDICINE | Facility: CLINIC | Age: 39
End: 2024-02-28
Payer: COMMERCIAL

## 2024-02-28 VITALS
DIASTOLIC BLOOD PRESSURE: 90 MMHG | WEIGHT: 147 LBS | HEIGHT: 68 IN | BODY MASS INDEX: 22.28 KG/M2 | HEART RATE: 86 BPM | SYSTOLIC BLOOD PRESSURE: 131 MMHG

## 2024-02-28 DIAGNOSIS — Z79.891 LONG-TERM CURRENT USE OF OPIATE ANALGESIC: ICD-10-CM

## 2024-02-28 DIAGNOSIS — G89.4 CHRONIC PAIN SYNDROME: Primary | ICD-10-CM

## 2024-02-28 DIAGNOSIS — F11.20 UNCOMPLICATED OPIOID DEPENDENCE (HCC): ICD-10-CM

## 2024-02-28 DIAGNOSIS — M54.50 CHRONIC BILATERAL LOW BACK PAIN WITHOUT SCIATICA: ICD-10-CM

## 2024-02-28 DIAGNOSIS — M51.16 INTERVERTEBRAL DISC DISORDER WITH RADICULOPATHY OF LUMBAR REGION: ICD-10-CM

## 2024-02-28 DIAGNOSIS — M45.0 ANKYLOSING SPONDYLITIS OF MULTIPLE SITES IN SPINE (HCC): ICD-10-CM

## 2024-02-28 DIAGNOSIS — G89.29 CHRONIC BILATERAL LOW BACK PAIN WITHOUT SCIATICA: ICD-10-CM

## 2024-02-28 PROCEDURE — 99214 OFFICE O/P EST MOD 30 MIN: CPT

## 2024-02-28 RX ORDER — TRAMADOL HYDROCHLORIDE 50 MG/1
50 TABLET ORAL DAILY
Qty: 30 TABLET | Refills: 1 | Status: SHIPPED | OUTPATIENT
Start: 2024-03-16

## 2024-02-28 NOTE — PROGRESS NOTES
Office Visit - General Surgery  Fransisco Ricardo MRN: [id number]  Encounter: [id number]  Assessment/Plan    Problem List Items Addressed This Visit    None      Subjective    Fransisco Ricardo is a 38 y.o. male who presents ***  ***    Pt  has a past medical history of Ankylosing spondylitis (HCC), Arthritis, AS (ankylosing spondylitis) (HCC) (07/21/2017), Encounter for monitoring of etanercept therapy (01/07/2019), and Kidney stone.    Pt  has a past surgical history that includes [place] tooth extraction and pr arthrs hip debridement/shaving articular crtlg (Left, 6/1/2017).    family history includes No Known Problems in his father, maternal grandfather, maternal grandmother, mother, and paternal grandmother; Skin cancer in his paternal grandfather.    Fransisco Ricardo reports that he has been smoking cigarettes. He has a 7.5 pack-year smoking history. He has never used smokeless tobacco. He reports that he does not currently use drugs after having used the following drugs: Marijuana. He reports that he does not drink alcohol.      Current Outpatient Medications on File Prior to Visit   Medication Sig Dispense Refill    calcium carbonate (TUMS) 500 mg chewable tablet Chew 1 tablet daily      ergocalciferol (VITAMIN D2) 50,000 units Take 1 capsule (50,000 Units total) by mouth once a week 8 capsule 0    etanercept (ENBREL SURECLICK) 50 MG/ML injection Inject 1 mL (50 mg total) under the skin every 7 days 12 mL 3    famotidine (PEPCID) 20 mg tablet take 1 tablet by mouth once daily 90 tablet 0    meloxicam (MOBIC) 15 mg tablet take 1 tablet by mouth once daily AS NEEDED FOR ANY JOINT PAIN (Patient taking differently: Take 15 mg by mouth daily at bedtime) 90 tablet 1    methocarbamol (ROBAXIN) 500 mg tablet Take 1 tablet (500 mg total) by mouth daily at bedtime as needed for muscle spasms 30 tablet 3    traMADol (ULTRAM) 50 mg tablet Take 1 tablet daily prn pain (Patient taking differently: Take 50 mg by mouth in the morning Take  1 tablet daily prn pain) 30 tablet 2     No current facility-administered medications on file prior to visit.     No Known Allergies    Review of Systems   Musculoskeletal:  Positive for back pain and gait problem.       Objective    Vitals:    02/28/24 1418   BP: 131/90   Pulse: 86       Physical Exam

## 2024-02-28 NOTE — PATIENT INSTRUCTIONS

## 2024-02-28 NOTE — PROGRESS NOTES
Assessment:  1. Chronic pain syndrome    2. Intervertebral disc disorder with radiculopathy of lumbar region    3. Ankylosing spondylitis of multiple sites in spine (HCC)    4. Chronic bilateral low back pain without sciatica    5. Uncomplicated opioid dependence (HCC)    6. Long-term current use of opiate analgesic        Plan:  The patient is a 38-year-old male with a history of chronic pain secondary to low back pain, lumbar intervertebral disc disorder with radiculopathy and ankylosing spondylitis who presents to the office with ongoing bilateral low back pain that is unchanged since his last office visit.     Overall his pain continues to be managed with taking tramadol, therefore I will continue him on this medication as prescribed.  A prescription for tramadol 50 mg with 1 refill was electronically sent to the patient's pharmacy with a do not fill date of 3/16/2024.    An opioid contract was reviewed with the patient.  The patient was made aware they are only to receive opioid medication from our office, and must take the medication as prescribed.  If the medication is lost or stolen, it will not be replaced.  We also do not condone the use of illegal substances or alcohol with opioid medication.  Random urine drug screens and pill counts will also be performed at office visits. Lastly, the patient was informed that office visits are needed for refills.  Patient was agreeable and signed the contract.    There are risks associated with opioid medications, including dependence, addiction and tolerance. The patient understands and agrees to use these medications only as prescribed. Potential side effects of the medications include, but are not limited to, constipation, drowsiness, addiction, impaired judgment and risk of fatal overdose if not taken as prescribed. The patient was warned against driving while taking sedation medications.  Sharing medications is a felony. At this point in time, the patient is  showing no signs of addiction, abuse, diversion or suicidal ideation.    A urine drug screen was collected at today's office visit as part of our medication management protocol. The point of care testing results were appropriate for what was being prescribed. The specimen will be sent for confirmatory testing. The drug screen is medically necessary because the patient is either dependent on opioid medication or is being considered for opioid medication therapy and the results could impact ongoing or future treatment. The drug screen is to evaluate for the presences or absence of prescribed, non-prescribed, and/or illicit drugs/substances.    [place] Prescription Drug Monitoring Program report was reviewed and was appropriate     My impressions and treatment recommendations were discussed in detail with the patient who verbalized understanding and had no further questions.  Discharge instructions were provided. I personally saw and examined the patient and I agree with the above discussed plan of care.    Orders Placed This Encounter   Procedures    MM DT_Alprazolam Definitive Test    MM DT_Ethyl Glucuronide/Ethyl Sulfate Definitive Test    MM DT_Fentanyl Definitive Test    MM DT_Heroin Definitive Test    MM DT_Hydrocodone Definitive Test    MM DT_Hydromorphone Definitive Test    MM DT_Kratom Definitive Test    MM Lorazepam Definitive Test    MM DT_MDMA Definitive Test    MM DT_Meperidine Definitive Test    MM DT_Methadone Definitive Test    MM DT_Buprenorphine Definitive Test    MM DT_Morphine Definitive Test    MM DT_Methylphenidate Definitive Test    MM DT_Oxazepam Definitive Test    MM DT_Oxycodone Definitive Test    MM DT_Oxymorphone Definitive Test    MM DT_Tapentadol Definitive Test    MM DT_Spice Definitive Test    MM DT_Secobarbital Definitive Test    MM DT_Phentermine Definitive Test    MM DT_Phenobarbital Definitive Test    MM DT_Amphetamine Definitive Test    MM DT_THC Definitive Test    MM  DT_Tramadol Definitive Test    MM DT_Temazapam Definitive Test    MM DT_Butalbital Definitive Test    MM DT_Clonazepam Definitive Test    MM DT_Cocaine Definitive Test    MM DT_Codeine Definitive Test    MM DT_Dextromethorphan Definitive Test    MM Diazepam Definitive Test     New Medications Ordered This Visit   Medications    traMADol (ULTRAM) 50 mg tablet     Sig: Take 1 tablet (50 mg total) by mouth in the morning Take 1 tablet daily prn pain Do not start before March 16, 2024.     Dispense:  30 tablet     Refill:  1       History of Present Illness:  Fransisco Ricardo is a 38 y.o. male with a history of chronic pain secondary to low back pain, lumbar intervertebral disc disorder with radiculopathy and ankylosing spondylitis.  He was last seen on 12/6/2023 where we took over his tramadol prescription as his rheumatologist left the practice.  He presents to the office with ongoing bilateral low back pain.    He states his pain is the same since the last office visit and constant.  He rates quality of his pain as dull/aching, sharp and is currently rating his pain a 6/10 on a numeric scale.    Current pain medications include tramadol 50 mg half a tablet daily which is providing him 30% relief of his pain without side effects.  He also takes meloxicam in the evening as prescribed by rheumatology.    Opioid contract date: 2/20/2024  Last UDS Date: 2/28/2024      I have personally reviewed and/or updated the patient's past medical history, past surgical history, family history, social history, current medications, allergies, and vital signs today.     Review of Systems   Musculoskeletal:  Positive for back pain.       Patient Active Problem List   Diagnosis    Primary localized osteoarthritis of left hip    NSAID long-term use    Ankylosing spondylitis of multiple sites in spine ([facility])       Past Medical History:   Diagnosis Date    Ankylosing spondylitis ([facility])     Arthritis     AS (ankylosing spondylitis) ([facility])  07/21/2017    Encounter for monitoring of etanercept therapy 01/07/2019    Kidney stone        Past Surgical History:   Procedure Laterality Date    [place] [facility] HIP DEBRIDEMENT/SHAVING ARTICULAR CRTLG Left 6/1/2017    Procedure: LEFT HIP ARTHROSCOPIC LABRAL DEBRIDEMENT;  Surgeon: Oscar Ferrara MD;  Location: [facility];  Service: Orthopedics    WISDOM TOOTH EXTRACTION         Family History   Problem Relation Age of Onset    No Known Problems Mother     No Known Problems Father     No Known Problems Maternal Grandmother     No Known Problems Maternal Grandfather     No Known Problems Paternal Grandmother     Skin cancer Paternal Grandfather        Social History     Occupational History    Not on file   Tobacco Use    Smoking status: Every Day     Current packs/day: 0.50     Average packs/day: 0.5 packs/day for 15.0 years (7.5 ttl pk-yrs)     Types: Cigarettes    Smokeless tobacco: Never   Vaping Use    Vaping status: Never Used   Substance and Sexual Activity    Alcohol use: No    Drug use: Not Currently     Types: Marijuana    Sexual activity: Yes     Partners: Female     Birth control/protection: Condom Male       Current Outpatient Medications on File Prior to Visit   Medication Sig    calcium carbonate (TUMS) 500 mg chewable tablet Chew 1 tablet daily    ergocalciferol (VITAMIN D2) 50,000 units Take 1 capsule (50,000 Units total) by mouth once a week    etanercept (ENBREL SURECLICK) 50 MG/ML injection Inject 1 mL (50 mg total) under the skin every 7 days    famotidine (PEPCID) 20 mg tablet take 1 tablet by mouth once daily    meloxicam (MOBIC) 15 mg tablet take 1 tablet by mouth once daily AS NEEDED FOR ANY JOINT PAIN (Patient taking differently: Take 15 mg by mouth daily at bedtime)    methocarbamol (ROBAXIN) 500 mg tablet Take 1 tablet (500 mg total) by mouth daily at bedtime as needed for muscle spasms    [DISCONTINUED] traMADol (ULTRAM) 50 mg tablet Take 1 tablet daily prn pain (Patient taking differently:  "Take 50 mg by mouth in the morning Take 1 tablet daily prn pain)     No current facility-administered medications on file prior to visit.       No Known Allergies    Physical Exam:    /90   Pulse 86   Ht 5' 8\" (1.727 m)   Wt 66.7 kg (147 lb)   BMI 22.35 kg/m²     Constitutional:normal, well developed, well nourished, alert, in no distress and non-toxic and no overt pain behavior.  Eyes:anicteric  HEENT:grossly intact  Neck:supple, symmetric, trachea midline and no masses   Pulmonary:even and unlabored  Cardiovascular:No edema or pitting edema present  Skin:Normal without rashes or lesions and well hydrated  Psychiatric:Mood and affect appropriate  Neurologic:Cranial Nerves II-XII grossly intact  Musculoskeletal:normal    Imaging     "

## 2024-03-01 LAB
6MAM UR QL CFM: NEGATIVE NG/ML
7AMINOCLONAZEPAM UR QL CFM: NEGATIVE NG/ML
A-OH ALPRAZ UR QL CFM: NEGATIVE NG/ML
AMPHET UR QL CFM: NEGATIVE NG/ML
BUPRENORPHINE UR QL CFM: NEGATIVE NG/ML
BUTALBITAL UR QL CFM: NEGATIVE NG/ML
BZE UR QL CFM: NEGATIVE NG/ML
CODEINE UR QL CFM: NEGATIVE NG/ML
EDDP UR QL CFM: NEGATIVE NG/ML
ETHYL GLUCURONIDE UR QL CFM: NEGATIVE NG/ML
ETHYL SULFATE UR QL SCN: NEGATIVE NG/ML
FENTANYL UR QL CFM: NEGATIVE NG/ML
GLIADIN IGG SER IA-ACNC: NEGATIVE NG/ML
HYDROCODONE UR QL CFM: NEGATIVE NG/ML
HYDROCODONE UR QL CFM: NEGATIVE NG/ML
HYDROMORPHONE UR QL CFM: NEGATIVE NG/ML
LORAZEPAM UR QL CFM: NEGATIVE NG/ML
MDMA UR QL CFM: NEGATIVE NG/ML
ME-PHENIDATE UR QL CFM: NEGATIVE NG/ML
MEPERIDINE UR QL CFM: NEGATIVE NG/ML
METHADONE UR QL CFM: NEGATIVE NG/ML
MORPHINE UR QL CFM: NEGATIVE NG/ML
MORPHINE UR QL CFM: NEGATIVE NG/ML
NORBUPRENORPHINE UR QL CFM: NEGATIVE NG/ML
NORDIAZEPAM UR QL CFM: NEGATIVE NG/ML
NORFENTANYL UR QL CFM: NEGATIVE NG/ML
NORHYDROCODONE UR QL CFM: NEGATIVE NG/ML
NORHYDROCODONE UR QL CFM: NEGATIVE NG/ML
NORMEPERIDINE UR QL CFM: NEGATIVE NG/ML
NOROXYCODONE UR QL CFM: NEGATIVE NG/ML
OXAZEPAM UR QL CFM: NEGATIVE NG/ML
OXYCODONE UR QL CFM: NEGATIVE NG/ML
OXYMORPHONE UR QL CFM: NEGATIVE NG/ML
OXYMORPHONE UR QL CFM: NEGATIVE NG/ML
PARA-FLUOROFENTANYL QUANTIFICATION: NORMAL NG/ML
PHENOBARB UR QL CFM: NEGATIVE NG/ML
SECOBARBITAL UR QL CFM: NEGATIVE NG/ML
SL AMB 4-ANPP QUANTIFICATION: NORMAL NG/ML
SL AMB 5F-ADB-M7 METABOLITE QUANTIFICATION: NEGATIVE NG/ML
SL AMB 7-OH-MITRAGYNINE (KRATOM ALKALOID) QUANTIFICATION: NEGATIVE NG/ML
SL AMB AB-FUBINACA-M3 METABOLITE QUANTIFICATION: NEGATIVE NG/ML
SL AMB ACETYL FENTANYL QUANTIFICATION: NORMAL NG/ML
SL AMB ACETYL NORFENTANYL QUANTIFICATION: NORMAL NG/ML
SL AMB ACRYL FENTANYL QUANTIFICATION: NORMAL NG/ML
SL AMB CARFENTANIL QUANTIFICATION: NORMAL NG/ML
SL AMB CTHC (MARIJUANA METABOLITE) QUANTIFICATION: NEGATIVE NG/ML
SL AMB DEXTROMETHORPHAN QUANTIFICATION: NEGATIVE NG/ML
SL AMB DEXTRORPHAN (DEXTROMETHORPHAN METABOLITE) QUANT: NEGATIVE NG/ML
SL AMB JWH018 METABOLITE QUANTIFICATION: NEGATIVE NG/ML
SL AMB JWH073 METABOLITE QUANTIFICATION: NEGATIVE NG/ML
SL AMB MDMB-FUBINACA-M1 METABOLITE QUANTIFICATION: NEGATIVE NG/ML
SL AMB N-DESMETHYL-TRAMADOL QUANTIFICATION: NORMAL NG/ML
SL AMB PHENTERMINE QUANTIFICATION: NEGATIVE NG/ML
SL AMB RCS4 METABOLITE QUANTIFICATION: NEGATIVE NG/ML
SL AMB RITALINIC ACID QUANTIFICATION: NEGATIVE NG/ML
SPECIMEN DRAWN SERPL: NEGATIVE NG/ML
TAPENTADOL UR QL CFM: NEGATIVE NG/ML
TEMAZEPAM UR QL CFM: NEGATIVE NG/ML
TEMAZEPAM UR QL CFM: NEGATIVE NG/ML
TRAMADOL UR QL CFM: NORMAL NG/ML
URATE/CREAT 24H UR: NORMAL NG/ML

## 2024-03-04 ENCOUNTER — OFFICE VISIT (OUTPATIENT)
Dept: RHEUMATOLOGY | Facility: CLINIC | Age: 39
End: 2024-03-04

## 2024-03-04 VITALS
WEIGHT: 147 LBS | SYSTOLIC BLOOD PRESSURE: 142 MMHG | BODY MASS INDEX: 22.28 KG/M2 | HEIGHT: 68 IN | DIASTOLIC BLOOD PRESSURE: 70 MMHG

## 2024-03-04 DIAGNOSIS — M45.0 ANKYLOSING SPONDYLITIS OF MULTIPLE SITES IN SPINE (HCC): Primary | ICD-10-CM

## 2024-03-04 DIAGNOSIS — Z79.899 HIGH RISK MEDICATION USE: ICD-10-CM

## 2024-03-04 DIAGNOSIS — Z79.60 LONG-TERM USE OF IMMUNOSUPPRESSANT MEDICATION: ICD-10-CM

## 2024-03-04 NOTE — PROGRESS NOTES
Rheumatology Follow-up Visit  3/4/2024     CC: routine follow up     Permanent History: AS since his 20s on Enbrel for about 10 years; tried switching to Remicaide in 2020 due to concern for waning efficacy of Enbrel but developed burning sensation with each infusion so decided to switch back to Enbrel. Also on chronic NSAIDs    Assessment: 38 y.o. male here for/with the above.    Doing well since restarting Enbrel    Discussed staying with Enbrel v switching to Rinvoq, will stay w Enbrel for now as we don't want to throw out a med that's working    Patient's rheumatologic disease(s) threaten long-term function if not appropriately treated.     Encounter Diagnosis     ICD-10-CM    1. Ankylosing spondylitis of multiple sites in spine (HCC)  M45.0       2. High risk medication use  Z79.899       3. Long-term use of immunosuppressant medication  Z79.60         Plan:  -Continue Enbrel and meloxicam  -RTC 3 mos or sooner PRN    Cam Emerson DO, TOO Emerson DO, TOO [facility]      Interval History: CRP was super high, talked to him and he had restarted his Enbrel due to feeling inflammation all over. Rechecked CRP after this, drastically better    Feeling much better since restarting Enbrel    No acute concerns today    Outpatient Medications Marked as Taking for the 3/4/24 encounter (Office Visit) with Cam Emerson DO   Medication    calcium carbonate (TUMS) 500 mg chewable tablet    ergocalciferol (VITAMIN D2) 50,000 units    etanercept (ENBREL SURECLICK) 50 MG/ML injection    famotidine (PEPCID) 20 mg tablet    meloxicam (MOBIC) 15 mg tablet    methocarbamol (ROBAXIN) 500 mg tablet    [START ON 3/16/2024] traMADol (ULTRAM) 50 mg tablet       Social History     Tobacco Use    Smoking status: Every Day     Current packs/day: 0.50     Average packs/day: 0.5 packs/day for 15.0 years (7.5 ttl pk-yrs)     Types: Cigarettes    Smokeless tobacco: Never   Vaping Use    Vaping status: Never Used  "  Substance Use Topics    Alcohol use: No    Drug use: Not Currently     Types: Marijuana       Review of Systems:  Pertinent findings documented in HPI  ___________________________________    Physical Exam:    /70   Ht 5' 8\" (1.727 m)   Wt 66.7 kg (147 lb)   BMI 22.35 kg/m²     General: Well appearing, in no distress.   Eyes: Sclera non-icteric. EOMI  HENT: No oral ulcers. MMM.   Extremities: Warm, well perfused, no edema.   Neuro: Alert and oriented. No gross focal neurological deficits.   Skin: No rashes.  MSK exam: hyperkyphosis of spine, no tenderness to palpation or synovitis any peripheral joint or spine or SIJ bilateral   ____________________________    Lab Results: relevant labs reviewed  My interpretation of most recent CRP is that it is not significantly elevated despite being flagged    Imaging Results: relevant images reviewed  "

## 2024-03-07 ENCOUNTER — TELEPHONE (OUTPATIENT)
Age: 39
End: 2024-03-07

## 2024-03-07 NOTE — TELEPHONE ENCOUNTER
PA for Enbrel    Submitted via    [x]CMM-KEY [id number]  [][employer]-Case ID #    []Faxed to plan   []Other website    []Phone call Case ID #      Office notes sent, clinical questions answered. Awaiting determination    Outcome Additional Information Required  PA has already submitted and is in process for this patient and drug.;CaseId:[id number];Status:In Process;    Turnaround time for your insurance to make a decision on your Prior Authorization can take 7-21 business days.

## 2024-03-07 NOTE — TELEPHONE ENCOUNTER
Reason for call:   [x] Prior Auth  [] Other:     Caller:  [] Patient  [x] Pharmacy  Name: [facility]  Address: [address] [place] [address]  Callback Number: [phone]  Fax number: [phone]  Case ID: [id number]    Medication:   Etanercept 50mg/ml- inject 1 ml under the skin every 7 days      Ordering Provider:   [] PCP/Provider -   [x] Speciality/Provider - Rheum

## 2024-03-11 DIAGNOSIS — M45.0 ANKYLOSING SPONDYLITIS OF MULTIPLE SITES IN SPINE (HCC): ICD-10-CM

## 2024-03-11 NOTE — TELEPHONE ENCOUNTER
Reason for call:   [x] Refill   [] Prior Auth  [] Other:     Office:   [] PCP/Provider -   [x] Specialty/Provider - Rheum/Cam Emerson       Medication: etanercept (ENBREL SURECLICK) 50 MG/ML injection      Dose/Frequency:  Inject 1 mL (50 mg total) under the skin every 7 days     Quantity: 12ml    Pharmacy: [address]     Does the patient have enough for 3 days?   [x] Yes   [] No - Send as HP to POD

## 2024-03-15 ENCOUNTER — TELEPHONE (OUTPATIENT)
Age: 39
End: 2024-03-15

## 2024-03-15 NOTE — TELEPHONE ENCOUNTER
PA for Enbrel Approved   Date(s) approved 2/14/24-3/15/25  Case # [id number]    Patient advised by [] [employer] Message                      [x] Phone call       Pharmacy advised by [x]Fax                                     []Phone call    Approval letter scanned into Media No Approval received via [employer]      [facility]

## 2024-03-15 NOTE — TELEPHONE ENCOUNTER
PA for Enbrel    Submitted via    []CMM-KEY    [x]Michael-Case ID # [id number]  []Faxed to plan   []Other website    []Phone call Case ID #      Office notes sent, clinical questions answered. Awaiting determination    Turnaround time for your insurance to make a decision on your Prior Authorization can take 7-21 business days.

## 2024-04-03 ENCOUNTER — TELEPHONE (OUTPATIENT)
Dept: FAMILY MEDICINE CLINIC | Facility: CLINIC | Age: 39
End: 2024-04-03

## 2024-04-03 DIAGNOSIS — E53.8 B12 DEFICIENCY: Primary | ICD-10-CM

## 2024-04-04 ENCOUNTER — OFFICE VISIT (OUTPATIENT)
Age: 39
End: 2024-04-04
Payer: COMMERCIAL

## 2024-04-04 VITALS
WEIGHT: 146 LBS | SYSTOLIC BLOOD PRESSURE: 130 MMHG | BODY MASS INDEX: 22.13 KG/M2 | DIASTOLIC BLOOD PRESSURE: 86 MMHG | HEIGHT: 68 IN

## 2024-04-04 DIAGNOSIS — K62.5 RECTAL BLEEDING: ICD-10-CM

## 2024-04-04 DIAGNOSIS — K60.2 ANAL FISSURE: ICD-10-CM

## 2024-04-04 DIAGNOSIS — K62.89 ANAL PAIN: Primary | ICD-10-CM

## 2024-04-04 DIAGNOSIS — K64.9 HEMORRHOIDS, UNSPECIFIED HEMORRHOID TYPE: ICD-10-CM

## 2024-04-04 DIAGNOSIS — Z83.711 FAMILY HISTORY OF HYPERPLASTIC COLON POLYPS: ICD-10-CM

## 2024-04-04 PROCEDURE — 99244 OFF/OP CNSLTJ NEW/EST MOD 40: CPT | Performed by: COLON & RECTAL SURGERY

## 2024-04-04 NOTE — ASSESSMENT & PLAN NOTE
The patient has had bright red rectal bleeding for some time.  This is intermittent and only occurs with bowel.  However, his father has a history of polyps which required colectomy in his early 50s.  Colonoscopy is recommended.  Colonoscopy risks, not limited to bleeding, perforated colon, need for surgery, and missed lesions were discussed. Alternatives were discussed. Questions were answered. He agreed to the procedure.

## 2024-04-04 NOTE — ASSESSMENT & PLAN NOTE
 Hemoglobin [facility] [address] [facility] [address] (The patient has had bright red rectal bleeding for some time.  This is intermittent and only occurs with bowel.  However, his father has a history of polyps which required colectomy in his early 50s.  Colonoscopy is recommended.  Colonoscopy risks, not limited to bleeding, perforated colon, need for surgery, and missed lesions were discussed. Alternatives were discussed. Questions were answered. He agreed to the procedure.    [facility] [address] 

## 2024-04-04 NOTE — ASSESSMENT & PLAN NOTE
The patient presents for evaluation of anal pain.  There is also a component of bleeding that is mild.  It is bright red.  The pain occurs mostly when he has bowel movements but can bother him during the day, particularly when he is sitting for long periods of time.    Exam is suggestive of a tiny fissure that seems to be well-healed at this time.  There is a mild external hemorrhoid anteriorly.  More significantly, I believe there is a component of levator spasm/syndrome with levator tenderness on examination.    I explained the findings.  My best recommendation is to increase his fiber in his diet.  He has some issues with eating fruit and allergies.  I recommend he eat the fruits that he is not allergic to and maximize his vegetable intake.  Sheet was given to him with instructions.  Metamucil is recommended daily.    I referred him for pelvic floor physical therapy evaluation.  They may be able to work on the levator spasm.  I would guess that his ankylosing spondylitis has a lot to do with his levator spasm.  He is in a fixed curve position to some degree and is unable to sit forward on his ischial tuberosities without effort.  He does stretches and I recommended he increase these.

## 2024-04-04 NOTE — ASSESSMENT & PLAN NOTE
The fissure is anteriorly positioned and is very small and distal in the anal canal.  It is currently reepithelialized.  A high fiber diet is recommended. I administered a fiber sheet and gave instructions on its use. Supplements were discussed. Exercise is recommended.     I will order nifedipine gel to assist with the anal fissure

## 2024-04-07 DIAGNOSIS — E55.9 VITAMIN D DEFICIENCY: ICD-10-CM

## 2024-04-07 RX ORDER — ERGOCALCIFEROL 1.25 MG/1
50000 CAPSULE ORAL WEEKLY
Qty: 8 CAPSULE | Refills: 0 | Status: SHIPPED | OUTPATIENT
Start: 2024-04-07

## 2024-04-08 ENCOUNTER — TELEPHONE (OUTPATIENT)
Age: 39
End: 2024-04-08

## 2024-04-08 NOTE — TELEPHONE ENCOUNTER
Patient will call when ready to schedule    ----- Message from LUCIUS Junior MD sent at 4/4/2024  9:43 AM EDT -----  colonoscopy

## 2024-04-26 DIAGNOSIS — K21.9 GASTROESOPHAGEAL REFLUX DISEASE WITHOUT ESOPHAGITIS: ICD-10-CM

## 2024-04-29 RX ORDER — FAMOTIDINE 20 MG/1
20 TABLET, FILM COATED ORAL DAILY
Qty: 90 TABLET | Refills: 0 | Status: SHIPPED | OUTPATIENT
Start: 2024-04-29

## 2024-05-23 ENCOUNTER — OFFICE VISIT (OUTPATIENT)
Dept: PAIN MEDICINE | Facility: CLINIC | Age: 39
End: 2024-05-23
Payer: COMMERCIAL

## 2024-05-23 VITALS
RESPIRATION RATE: 18 BRPM | HEIGHT: 68 IN | SYSTOLIC BLOOD PRESSURE: 125 MMHG | WEIGHT: 152 LBS | DIASTOLIC BLOOD PRESSURE: 85 MMHG | BODY MASS INDEX: 23.04 KG/M2 | HEART RATE: 82 BPM

## 2024-05-23 DIAGNOSIS — M54.9 MID BACK PAIN: ICD-10-CM

## 2024-05-23 DIAGNOSIS — M54.50 CHRONIC BILATERAL LOW BACK PAIN WITHOUT SCIATICA: ICD-10-CM

## 2024-05-23 DIAGNOSIS — M45.0 ANKYLOSING SPONDYLITIS OF MULTIPLE SITES IN SPINE (HCC): Primary | ICD-10-CM

## 2024-05-23 DIAGNOSIS — G89.4 CHRONIC PAIN SYNDROME: ICD-10-CM

## 2024-05-23 DIAGNOSIS — G89.29 CHRONIC BILATERAL LOW BACK PAIN WITHOUT SCIATICA: ICD-10-CM

## 2024-05-23 PROCEDURE — 99214 OFFICE O/P EST MOD 30 MIN: CPT

## 2024-05-23 RX ORDER — TRAMADOL HYDROCHLORIDE 50 MG/1
50 TABLET ORAL DAILY
Qty: 30 TABLET | Refills: 1 | Status: SHIPPED | OUTPATIENT
Start: 2024-05-30

## 2024-05-23 NOTE — PROGRESS NOTES
Pain Medicine Follow-Up Note    Assessment:  1. Ankylosing spondylitis of multiple sites in spine (HCC)    2. Chronic pain syndrome    3. Chronic bilateral low back pain without sciatica    4. Mid back pain        Plan:  The patient is a 38-year-old male with a history of chronic pain secondary to low back pain, lumbar intervertebral disc disorder with radiculopathy and ankylosing spondylitis who presents to the office with ongoing mid back and bilateral low back pain that is unchanged since his last office visit.    Overall his pain continues to be managed with taking tramadol, therefore I will continue him on this medication as prescribed. A prescription for tramadol 50 mg with 1 refill was electronically sent to the patient's pharmacy with a do not fill date of 5/30/2024.    No orders of the defined types were placed in this encounter.      New Medications Ordered This Visit   Medications    traMADol (ULTRAM) 50 mg tablet     Sig: Take 1 tablet (50 mg total) by mouth in the morning Take 1 tablet daily prn pain Do not start before May 30, 2024.     Dispense:  30 tablet     Refill:  1       My impressions and treatment recommendations were discussed in detail with the patient who verbalized understanding and had no further questions.      [place] Prescription Drug Monitoring Program report was reviewed and was appropriate     There are risks associated with opioid medications, including dependence, addiction and tolerance. The patient understands and agrees to use these medications only as prescribed. Potential side effects of the medications include, but are not limited to, constipation, drowsiness, addiction, impaired judgment and risk of fatal overdose if not taken as prescribed. The patient was warned against driving while taking sedation medications.  Sharing medications is a felony. At this point in time, the patient is showing no signs of addiction, abuse, diversion or suicidal ideation.    Follow-up is  planned in 12 weeks time or sooner as warranted.  Discharge instructions were provided. I personally saw and examined the patient and I agree with the above discussed plan of care.    History of Present Illness:    Fransisco Ricardo is a 38 y.o. male with a history of chronic pain secondary to low back pain, lumbar intervertebral disc disorder with radiculopathy and ankylosing spondylitis.  He was last seen on 2/28/2024 where he was continued on tramadol 50 mg.  He presents to the office with ongoing mid back and bilateral low back pain.    He states his pain is the same since the last office visit and constant but worse in the evening.  He rates quality of his pain as dull/aching and stiffness and is currently rating his pain a 5/10 on a numeric scale.    Current pain medications include tramadol 50 mg half a tablet daily which is providing him moderate relief of his pain without side effects. He also takes meloxicam in the evening as prescribed by rheumatology.     Pain Contract Signed:  2/28/24  Last Urine Drug Screen:  2/28/24  Last Dose:    Other than as stated above, the patient denies any interval changes in medications, medical condition, mental condition, symptoms, or allergies since the last office visit.         Review of Systems:    Review of Systems   Musculoskeletal:  Positive for back pain, gait problem and joint swelling.         Past Medical History:   Diagnosis Date    Ankylosing spondylitis (HCC)     Arthritis     AS (ankylosing spondylitis) (HCC) 07/21/2017    Encounter for monitoring of etanercept therapy 01/07/2019    Kidney stone        Past Surgical History:   Procedure Laterality Date    NH ARTHRS HIP DEBRIDEMENT/SHAVING ARTICULAR CRTLG Left 6/1/2017    Procedure: LEFT HIP ARTHROSCOPIC LABRAL DEBRIDEMENT;  Surgeon: Oscar Ferrara MD;  Location: [facility];  Service: Orthopedics    WISDOM TOOTH EXTRACTION         Family History   Problem Relation Age of Onset    Colon polyps Father     No Known  "Problems Maternal Grandmother     No Known Problems Maternal Grandfather     No Known Problems Paternal Grandmother     Skin cancer Paternal Grandfather        Social History     Occupational History    Not on file   Tobacco Use    Smoking status: Every Day     Current packs/day: 0.50     Average packs/day: 0.5 packs/day for 15.0 years (7.5 ttl pk-yrs)     Types: Cigarettes    Smokeless tobacco: Never   Vaping Use    Vaping status: Never Used   Substance and Sexual Activity    Alcohol use: No    Drug use: Not Currently     Types: Marijuana    Sexual activity: Yes     Partners: Female     Birth control/protection: Condom Male         Current Outpatient Medications:     calcium carbonate (TUMS) 500 mg chewable tablet, Chew 1 tablet daily, Disp: , Rfl:     ergocalciferol (VITAMIN D2) 50,000 units, take 1 capsule by mouth every week, Disp: 8 capsule, Rfl: 0    etanercept (ENBREL SURECLICK) 50 MG/ML injection, Inject 1 mL (50 mg total) under the skin every 7 days, Disp: 12 mL, Rfl: 3    famotidine (PEPCID) 20 mg tablet, take 1 tablet by mouth once daily, Disp: 90 tablet, Rfl: 0    meloxicam (MOBIC) 15 mg tablet, take 1 tablet by mouth once daily AS NEEDED FOR ANY JOINT PAIN (Patient taking differently: Take 15 mg by mouth daily at bedtime), Disp: 90 tablet, Rfl: 1    methocarbamol (ROBAXIN) 500 mg tablet, Take 1 tablet (500 mg total) by mouth daily at bedtime as needed for muscle spasms, Disp: 30 tablet, Rfl: 3    NIFEdipine 0.3%-lidocaine 5% rectal ointment, Apply 1 Application topically every 4 (four) hours as needed for discomfort or pain Apply a small amount to anal fissure, Disp: 2 g, Rfl: 1    [START ON 5/30/2024] traMADol (ULTRAM) 50 mg tablet, Take 1 tablet (50 mg total) by mouth in the morning Take 1 tablet daily prn pain Do not start before May 30, 2024., Disp: 30 tablet, Rfl: 1    No Known Allergies    Physical Exam:    /85   Pulse 82   Resp 18   Ht 5' 8\" (1.727 m)   Wt 68.9 kg (152 lb)   BMI 23.11 " kg/m²     Constitutional:normal, well developed, well nourished, alert, in no distress and non-toxic and no overt pain behavior.  Eyes:anicteric  HEENT:grossly intact  Neck:supple, symmetric, trachea midline and no masses   Pulmonary:even and unlabored  Cardiovascular:No edema or pitting edema present  Skin:Normal without rashes or lesions and well hydrated  Psychiatric:Mood and affect appropriate  Neurologic:Cranial Nerves II-XII grossly intact  Musculoskeletal:normal      Imaging  No orders to display         No orders of the defined types were placed in this encounter.

## 2024-05-23 NOTE — PATIENT INSTRUCTIONS
Opioid Safety   AMBULATORY CARE:   Opioid safety  includes the correct use, storage, and disposal of opioids. Examples of opioid medicines to treat pain include oxycodone, morphine, fentanyl, and codeine.  Call your local emergency number (911 in the [place]), or have someone else call if:   You have a seizure.    You cannot be woken.    You have trouble staying awake and your breathing is slow or shallow.    Your speech is slurred, or you are confused.    You are dizzy or stumble when you walk.    Call your doctor, or have someone close to you call if:   You are extremely drowsy, or you have trouble staying awake or speaking.    You have pale or clammy skin.    You have blue fingernails or lips.    Your heartbeat is slower than normal.    You cannot stop vomiting.    You have questions or concerns about your condition or care.    Use opioids safely:   Take prescribed opioids exactly as directed.  Opioids come with directions based on the kind of opioid and how it is given. Do not take more than the recommended amount, or for longer than needed.    Do not give opioids to others or take opioids that belong to someone else.  Misuse of opioids can lead to an addiction or overdose.    Do not mix opioids with other medicines or alcohol.  The combination can cause an overdose, or lead to a coma.    Do not drive or operate heavy machinery after you take the opioid.  Your provider or pharmacist can tell you how long to wait after a dose before you do these activities.    Talk to your healthcare provider if you have any side effects.  Your provider can help you prevent or relieve side effects. Side effects include nausea, sleepiness, itching, and trouble thinking clearly.    Manage constipation:  Constipation is the most common side effect of opioid medicine. Constipation is when you have hard, dry bowel movements, or you go longer than usual between bowel movements. Tell your healthcare provider about all changes in your bowel  movements while you are taking opioids. Your provider may recommend laxative medicine to help you have a bowel movement. Your provider may also change the kind of opioid you are taking, or change when you take it. The following are more ways you can prevent or relieve constipation:  Drink liquids as directed.  You may need to drink extra liquids to help soften and move your bowels. Ask how much liquid to drink each day and which liquids are best for you.    Eat high-fiber foods.  This may help decrease constipation by adding bulk to your bowel movements. High-fiber foods include fruits, vegetables, whole-grain breads and cereals, and beans. Your healthcare provider or dietitian can help you create a high-fiber meal plan. Your provider may also recommend a fiber supplement if you cannot get enough fiber from food.         Exercise regularly.  Regular physical activity can help stimulate your intestines. Walking is a good exercise to prevent or relieve constipation. Ask which exercises are best for you.         Schedule a time each day to have a bowel movement.  This may help train your body to have regular bowel movements. Bend forward while you are on the toilet to help move the bowel movement out. Sit on the toilet for at least 10 minutes, even if you do not have a bowel movement.    Store opioids safely:   Store opioids where others cannot easily get them.  Keep them in a locked cabinet or secure area. Do not  keep them in a purse or other bag you carry with you. A person may be looking for something else and find the opioids.         Make sure opioids are stored out of the reach of children.  A child can easily overdose on opioids. Opioids may look like candy to a small child.    The best way to dispose of opioids:  The laws vary by country and area. In the [place], the best way is to return the opioids through a take-back program. This program is offered by the [place] Drug Enforcement Agency (GERARDO). The  following are options for using the program:  Take the opioids to a GERARDO collection site.  The site is often a law enforcement center. Call your local law enforcement center for scheduled take-back days in your area. You will be given information on where to go if the collection site is in a different location.    Take the opioids to an approved pharmacy or hospital.  A pharmacy or hospital may be set up as a collection site. You will need to ask if it is a GERARDO collection site if you were not directed there. A pharmacy or doctor's office may not be able to take back opioids unless it is a GERADRO site.    Use a mail-back system.  This means you are given containers to put the opioids into. You will then mail them in the containers.    Use a take-back drop box.  This is a place to leave the opioids at any time. People and animals will not be able to get into the box. Your local law enforcement agency can tell you where to find a drop box in your area.    Other safe ways to dispose of opioids:  The medicine may come with disposal instructions. The instructions may vary depending on the brand of medicine you are using. Instructions may come in a Medication Guide, but not every medicine has one. You may instead get instructions from your pharmacy or doctor. Follow instructions carefully. The following are general guidelines to follow:  Find out if you can flush the opioid.  Some opioids can be flushed down the toilet or poured into the sink. You will need to contact authorities in your area to see if this is an option for you. The FDA also offers a list of medicines that are safe to flush down the toilet. You can check the list if you cannot get the information for your local area.    Ask your waste management company about rules for putting opioids in the trash.  The company will be able to give you specific directions. Scratch out personal information on the original medicine label so it cannot be read. Then put it in the  trash. Do not label the trash or put any information on it about the opioids. It should look like regular household trash so no one is tempted to look for the opioids. Keep the trash out of the reach of children and animals. Always make sure trash is secure.    Talk to officials if you live in a facility.  If you live in a nursing home or assisted living center, talk to an official. The person will know the rules for your area.    Other ways to manage pain:   Ask your healthcare provider about non-opioid medicines to control pain.  Nonprescription medicines include NSAIDs (such as ibuprofen) and acetaminophen. Prescription medicines include muscle relaxers, antidepressants, and steroids.    Pain may be managed without any medicines.  Some ways to relieve pain include massage, aromatherapy, or meditation. Physical or occupational therapy may also help.    Follow up with your doctor or pain specialist as directed:  You may need to have your dose adjusted. Your doctor or pain specialist can also help you find ways to manage pain without opioids. Write down your questions so you remember to ask them during your visits.  For more information:   Drug Enforcement Administration  [address] [place] [address]  Phone: 6- 337 - 564-0474  Web Address: https://www.deadiversion.[facility] Centero[facility].gov/drug_disposal/    [place] Food and Drug Administration  [address] [place] [address]  Phone: 6- 136 - 845-4193  Web Address: http://www.fda.gov  © Copyright [employer] 2023 Information is for End User's use only and may not be sold, redistributed or otherwise used for commercial purposes.  The above information is an  only. It is not intended as medical advice for individual conditions or treatments. Talk to your doctor, nurse or pharmacist before following any medical regimen to see if it is safe and effective for you.

## 2024-06-04 ENCOUNTER — OFFICE VISIT (OUTPATIENT)
Dept: RHEUMATOLOGY | Facility: CLINIC | Age: 39
End: 2024-06-04
Payer: COMMERCIAL

## 2024-06-04 VITALS
OXYGEN SATURATION: 99 % | HEIGHT: 68 IN | BODY MASS INDEX: 23.04 KG/M2 | HEART RATE: 81 BPM | SYSTOLIC BLOOD PRESSURE: 132 MMHG | DIASTOLIC BLOOD PRESSURE: 70 MMHG | WEIGHT: 152 LBS

## 2024-06-04 DIAGNOSIS — M45.0 ANKYLOSING SPONDYLITIS OF MULTIPLE SITES IN SPINE (HCC): Primary | ICD-10-CM

## 2024-06-04 PROCEDURE — 99214 OFFICE O/P EST MOD 30 MIN: CPT | Performed by: STUDENT IN AN ORGANIZED HEALTH CARE EDUCATION/TRAINING PROGRAM

## 2024-06-04 NOTE — PROGRESS NOTES
"Ambulatory Visit  Name: Fransisco Ricardo      : 1985      MRN: [id number]  Encounter Provider: Cam Emerson DO  Encounter Date: 2024   Encounter department: [facility] RHEUMATOLOGY [facility] History: AS since his 20s on Enbrel for about 10 years; tried switching to Remicaide in  due to concern for waning efficacy of Enbrel but developed burning sensation with each infusion so decided to switch back to Enbrel. Also on chronic NSAIDs     Assessment & Plan   1. Ankylosing spondylitis of multiple sites in spine (HCC)  Assessment & Plan:  Doing well on current regimen, continue meloxicam and Enbrel    Patient's rheumatologic disease(s) threaten long-term function if not appropriately treated.    History of Present Illness       Still feels that Enbrel and meloxicam are working well for him    Review of Systems    Objective     /70   Pulse 81   Ht 5' 8\" (1.727 m)   Wt 68.9 kg (152 lb)   SpO2 99%   BMI 23.11 kg/m²      General: Well appearing, in no distress.   Eyes: Sclera non-icteric. EOMI  HENT: No oral ulcers. MMM.   Extremities: Warm, well perfused, no edema.   Neuro: Alert and oriented. No gross focal neurological deficits.   Skin: No rashes.  MSK exam: no tenderness to palpation or synovitis, chronic flexion deformity of spine      Physical Exam  Administrative Statements           "

## 2024-06-20 DIAGNOSIS — M54.50 CHRONIC BILATERAL LOW BACK PAIN WITHOUT SCIATICA: ICD-10-CM

## 2024-06-20 DIAGNOSIS — M45.0 ANKYLOSING SPONDYLITIS OF MULTIPLE SITES IN SPINE (HCC): ICD-10-CM

## 2024-06-20 DIAGNOSIS — G89.29 CHRONIC BILATERAL LOW BACK PAIN WITHOUT SCIATICA: ICD-10-CM

## 2024-06-20 RX ORDER — MELOXICAM 15 MG/1
TABLET ORAL
Qty: 90 TABLET | Refills: 1 | Status: SHIPPED | OUTPATIENT
Start: 2024-06-20

## 2024-07-21 DIAGNOSIS — K21.9 GASTROESOPHAGEAL REFLUX DISEASE WITHOUT ESOPHAGITIS: ICD-10-CM

## 2024-07-22 RX ORDER — FAMOTIDINE 20 MG/1
20 TABLET, FILM COATED ORAL DAILY
Qty: 100 TABLET | Refills: 1 | Status: SHIPPED | OUTPATIENT
Start: 2024-07-22

## 2024-08-13 ENCOUNTER — OFFICE VISIT (OUTPATIENT)
Dept: PAIN MEDICINE | Facility: CLINIC | Age: 39
End: 2024-08-13
Payer: COMMERCIAL

## 2024-08-13 VITALS
BODY MASS INDEX: 22.88 KG/M2 | HEART RATE: 81 BPM | HEIGHT: 68 IN | DIASTOLIC BLOOD PRESSURE: 90 MMHG | SYSTOLIC BLOOD PRESSURE: 142 MMHG | RESPIRATION RATE: 16 BRPM | WEIGHT: 151 LBS

## 2024-08-13 DIAGNOSIS — M54.9 MID BACK PAIN: ICD-10-CM

## 2024-08-13 DIAGNOSIS — G89.29 CHRONIC BILATERAL LOW BACK PAIN WITHOUT SCIATICA: ICD-10-CM

## 2024-08-13 DIAGNOSIS — M45.0 ANKYLOSING SPONDYLITIS OF MULTIPLE SITES IN SPINE (HCC): Primary | ICD-10-CM

## 2024-08-13 DIAGNOSIS — G89.4 CHRONIC PAIN SYNDROME: ICD-10-CM

## 2024-08-13 DIAGNOSIS — M54.50 CHRONIC BILATERAL LOW BACK PAIN WITHOUT SCIATICA: ICD-10-CM

## 2024-08-13 PROCEDURE — 99214 OFFICE O/P EST MOD 30 MIN: CPT

## 2024-08-13 RX ORDER — TRAMADOL HYDROCHLORIDE 50 MG/1
50 TABLET ORAL DAILY
Qty: 30 TABLET | Refills: 1 | Status: SHIPPED | OUTPATIENT
Start: 2024-08-13

## 2024-08-13 NOTE — PROGRESS NOTES
Pain Medicine Follow-Up Note    Assessment:  1. Ankylosing spondylitis of multiple sites in spine (HCC)    2. Chronic pain syndrome    3. Chronic bilateral low back pain without sciatica    4. Mid back pain        Plan:  The patient is a 38-year-old male with a history of chronic pain secondary to low back pain, lumbar intervertebral disc disorder with radiculopathy and ankylosing spondylitis who presents to the office with ongoing bilateral low back pain and right-sided mid back pain that is unchanged since his last office visit.    Overall his pain continues to be managed with taking tramadol, therefore I will continue him on this medication as prescribed.  A 1 month prescription for tramadol 50 mg with 1 refill was electronically sent to the patient's pharmacy with a do not fill date of today, 8/13/2024.    No orders of the defined types were placed in this encounter.      New Medications Ordered This Visit   Medications    traMADol (ULTRAM) 50 mg tablet     Sig: Take 1 tablet (50 mg total) by mouth in the morning Take 1 tablet daily prn pain     Dispense:  30 tablet     Refill:  1       My impressions and treatment recommendations were discussed in detail with the patient who verbalized understanding and had no further questions.      [place] Prescription Drug Monitoring Program report was reviewed and was appropriate     There are risks associated with opioid medications, including dependence, addiction and tolerance. The patient understands and agrees to use these medications only as prescribed. Potential side effects of the medications include, but are not limited to, constipation, drowsiness, addiction, impaired judgment and risk of fatal overdose if not taken as prescribed. The patient was warned against driving while taking sedation medications.  Sharing medications is a felony. At this point in time, the patient is showing no signs of addiction, abuse, diversion or suicidal ideation.    Follow-up is  planned in 16 weeks time or sooner as warranted.  Discharge instructions were provided. I personally saw and examined the patient and I agree with the above discussed plan of care.    History of Present Illness:    Fransisco Ricardo is a 38 y.o. male with a history of chronic pain secondary to low back pain, lumbar intervertebral disc disorder with radiculopathy and ankylosing spondylitis. He was last seen on 5/23/2024 where he was continued on tramadol 50 mg.  He presents to the office with ongoing bilateral low back pain and right-sided mid back pain.    He states his pain is the same since the last office visit and constant.  He is currently rating his pain a 5/10 on a numeric scale.    Current pain medications include tramadol 50 mg, 0.5 tablets daily which is providing him 60% relief of his pain without side effects.  He is also taking meloxicam as prescribed by rheumatology.    Pain Contract Signed:  2/28/24  Last Urine Drug Screen:  2/28/24  Last Dose:8/13/24  Other than as stated above, the patient denies any interval changes in medications, medical condition, mental condition, symptoms, or allergies since the last office visit.         Review of Systems:    Review of Systems   Respiratory:  Negative for shortness of breath.    Cardiovascular:  Negative for chest pain.   Gastrointestinal:  Negative for constipation, diarrhea, nausea and vomiting.   Musculoskeletal:  Positive for back pain, gait problem and joint swelling. Negative for arthralgias and myalgias.        Decreased Range Of Motion   Skin:  Negative for rash.   Neurological:  Negative for dizziness, seizures and weakness.   All other systems reviewed and are negative.        Past Medical History:   Diagnosis Date    Ankylosing spondylitis ([facility])     Arthritis     AS (ankylosing spondylitis) ([facility]) 07/21/2017    Encounter for monitoring of etanercept therapy 01/07/2019    Kidney stone        Past Surgical History:   Procedure Laterality Date    VT ARTHRS  HIP DEBRIDEMENT/SHAVING ARTICULAR CRTLG Left 6/1/2017    Procedure: LEFT HIP ARTHROSCOPIC LABRAL DEBRIDEMENT;  Surgeon: Oscar Ferrara MD;  Location: [facility];  Service: Orthopedics    WISDOM TOOTH EXTRACTION         Family History   Problem Relation Age of Onset    Colon polyps Father     No Known Problems Maternal Grandmother     No Known Problems Maternal Grandfather     No Known Problems Paternal Grandmother     Skin cancer Paternal Grandfather        Social History     Occupational History    Not on file   Tobacco Use    Smoking status: Every Day     Current packs/day: 0.50     Average packs/day: 0.5 packs/day for 15.0 years (7.5 ttl pk-yrs)     Types: Cigarettes    Smokeless tobacco: Never   Vaping Use    Vaping status: Never Used   Substance and Sexual Activity    Alcohol use: No    Drug use: Not Currently     Types: Marijuana    Sexual activity: Yes     Partners: Female     Birth control/protection: Condom Male         Current Outpatient Medications:     calcium carbonate (TUMS) 500 mg chewable tablet, Chew 1 tablet daily, Disp: , Rfl:     ergocalciferol (VITAMIN D2) 50,000 units, take 1 capsule by mouth every week, Disp: 8 capsule, Rfl: 0    etanercept (ENBREL SURECLICK) 50 MG/ML injection, Inject 1 mL (50 mg total) under the skin every 7 days, Disp: 12 mL, Rfl: 3    famotidine (PEPCID) 20 mg tablet, take 1 tablet by mouth once daily, Disp: 100 tablet, Rfl: 1    meloxicam (MOBIC) 15 mg tablet, take 1 tablet by mouth once daily AS NEEDED FOR ANY JOINT PAIN, Disp: 90 tablet, Rfl: 1    methocarbamol (ROBAXIN) 500 mg tablet, Take 1 tablet (500 mg total) by mouth daily at bedtime as needed for muscle spasms, Disp: 30 tablet, Rfl: 3    traMADol (ULTRAM) 50 mg tablet, Take 1 tablet (50 mg total) by mouth in the morning Take 1 tablet daily prn pain, Disp: 30 tablet, Rfl: 1    NIFEdipine 0.3%-lidocaine 5% rectal ointment, Apply 1 Application topically every 4 (four) hours as needed for discomfort or pain Apply a  "small amount to anal fissure (Patient not taking: Reported on 6/4/2024), Disp: 2 g, Rfl: 1    No Known Allergies    Physical Exam:    /90   Pulse 81   Resp 16   Ht 5' 8\" (1.727 m)   Wt 68.5 kg (151 lb)   BMI 22.96 kg/m²     Constitutional:normal, well developed, well nourished, alert, in no distress and non-toxic and no overt pain behavior.  Eyes:anicteric  HEENT:grossly intact  Neck:supple, symmetric, trachea midline and no masses   Pulmonary:even and unlabored  Cardiovascular:No edema or pitting edema present  Skin:Normal without rashes or lesions and well hydrated  Psychiatric:Mood and affect appropriate  Neurologic:Cranial Nerves II-XII grossly intact  Musculoskeletal:normal      Imaging  No orders to display         No orders of the defined types were placed in this encounter.    "

## 2024-08-13 NOTE — PATIENT INSTRUCTIONS
"Patient Education     Taking opioids safely   The Basics   Written by the doctors and editors at [facility]   What are opioids? -- Opioids are a group of prescription medicines that relieve pain. They work by attaching to \"opioid receptors\" in the body and blocking pain signals.  Opioids are sometimes used when other types of pain medicine do not help enough. Opioids can be helpful for treating short-term, or \"acute,\" pain, like after surgery or an injury. They are also sometimes used to treat long-term, or \"chronic,\" pain, like for people with cancer. But they come with risks.  If your doctor prescribes an opioid medicine, it's important to understand the risks and know how to stay safe.  What are the risks of taking opioids? -- You should know that:   Opioids have side effects. Some are just bothersome, and some can be dangerous. For example, taking too much of an opioid is called an \"overdose.\" An overdose can cause serious problems and even death.   In some cases, taking opioids can lead to misuse. For example, people might take the medicine when they don't need it for pain. Or they might take more than they are supposed to. Sharing or selling opioids are other examples of misuse.   There is a risk of addiction. This is also called \"opioid use disorder.\"  If you take too much, or take opioids with alcohol or certain other drugs, it can cause serious harm. It can even cause death from overdose.  How do I stay safe? -- There are things you can do to stay safe if you need to take an opioid medicine (figure 1). These things help protect yourself and others.  Know your medicines:    Opioids come in different forms. \"Immediate-release\" medicines work quickly and last for a short time. \"Extended-release\" medicines work more slowly and last longer. Make sure that you know what type of opioid you have. Read the label and the information that comes with your prescription.   Follow your treatment plan carefully. Take only " "the dose your doctor prescribes, and only as often as they tell you to.   Never take opioids that were not prescribed to you.   Some opioids come combined with other medicines like acetaminophen or an \"NSAID\" (like ibuprofen). Do not take any extra NSAIDs or acetaminophen without talking to your doctor first.   Make sure that all of your doctors know every medicine you take, even those that are non-prescription. Some medicines can affect the way opioids work. Bring a complete list of all of your pain medicines and other medicines with you whenever you go to a doctor, nurse, dentist, or pharmacist.   Ask your doctor or pharmacist if it is safe to take your other medicines with your opioid medicine.  Use and store your medicine safely:    Do not drink alcohol while you are taking opioids.   Do not take opioids with medicines that make you sleepy, unless your doctor tells you to. Examples include:   \"Benzodiazepines\" like diazepam (sample brand name: Valium) or alprazolam (sample brand name: Xanax)   Gabapentin (sample brand name: Neurontin) or pregabalin (brand name: Lyrica)   Muscle relaxants like baclofen or cyclobenzaprine   Sleeping pills like zolpidem (sample brand name: Ambien)   Talk to your doctor about whether it is safe to drive. Opioids can make you feel tired or have trouble thinking clearly. If you are starting a new prescription or taking a higher dose, you might need to avoid things like driving, using dangerous machinery, or other activities that could be risky.   Store your opioids in a safe place, such as a locked cabinet. This prevents children, teens, or anyone else from getting to them.   Never share your opioids with other people.  Be aware of side effects:    Opioid medicines can cause side effects. There are often ways to prevent or treat these.   Call your doctor or nurse if you have side effects that bother you, such as:   Constipation - Your doctor or nurse might suggest that you take a " "laxative to prevent or treat constipation. If your bowel movements are hard and dry, a stool softener might help. Drink plenty of water, and try to get regular physical activity.   Mild nausea or stomach discomfort - Taking the medicine with or after food can help with this. Nausea usually gets better with time.   Severe nausea, vomiting, or itchiness - If you have any of these problems, your doctor might be able to switch you to a different medicine.   Dry mouth   Feeling dizzy or sleepy, or having trouble thinking clearly   Vision problems   Being clumsy or falling down   Know the signs of an opioid overdose. Get help right away if you think that you or someone else took too much of an opioid medicine. Signs of an overdose are listed below.  Stay safe when stopping your opioid medicine:    When opioids are needed to treat acute pain, doctors usually try to prescribe them for only a short time. This usually means a few days or a week. They also prescribe the lowest dose possible to relieve pain.   Follow your doctor's instructions about how to stop taking your opioid once your pain has improved. This usually involves \"tapering,\" or reducing the dose gradually. If you stop an opioid suddenly, this can cause unpleasant symptoms like stomach ache, diarrhea, or shakes. This is called \"withdrawal.\" Tapering the dose can help prevent withdrawal.   When your pain gets better, get rid of any leftover medicines. Your doctor, nurse, or pharmacist can suggest ways to get rid of them. This might involve flushing them down the toilet or mixing them with something like dirt or cat litter, then putting the mixture in a sealed container in the trash. Some police stations and pharmacies also take leftover medicines.  What is naloxone? -- Naloxone is a medicine that reverses the effects of opioids. It can prevent death from an opioid overdose. Naloxone comes as an injection (shot), or as a spray that goes in the nose. Naloxone nasal " spray (brand name: Narcan) is available without a prescription.  If you or someone you know uses opioids, it's a good idea to keep naloxone with you. Make sure that you and your family and friends know how and when to use it.  When should I call for help? -- If you are taking an opioid, it's important to know when to get help. Signs of an opioid overdose include:   Extreme sleepiness   Slow breathing, or no breathing at all   Very small pupils (the black circles in the center of the eyes)   Very slow heartbeat  If you took too much of your opioid medicine or think that someone is having an opioid overdose:   If you have naloxone, give it immediately. Naloxone can save a person's life. But it needs to be given as soon as possible.   Call for an ambulance right away (in the [place] and [place], call 9-1-1).  Call your doctor or nurse if:   You are having side effects that bother you.   You have questions about how to take your medicine.   You are having trouble managing your pain.  All topics are updated as new evidence becomes available and our peer review process is complete.  This topic retrieved from [facility] on: May 15, 2024.  Topic 899106 Version 1.0  Release: 32.4.3 - C32.134  © 2024 UpToDate, Inc. and/or its affiliates. All rights reserved.  figure 1: Tips for medication safety     Tips to use your medicine safely include:  (A) Read labels so you know how to take your medicines.  (B) Have a routine for taking your medicines.  (C) Make sure you know what foods, drinks, and other medicines are safe for you.  (D) Store medicines in a safe place.  (E) Work with your health care team and ask questions if you have them.  Graphic 297626 Version 2.0  Consumer Information Use and Disclaimer   Disclaimer: This generalized information is a limited summary of diagnosis, treatment, and/or medication information. It is not meant to be comprehensive and should be used as a tool to help the user understand and/or assess potential  diagnostic and treatment options. It does NOT include all information about conditions, treatments, medications, side effects, or risks that may apply to a specific patient. It is not intended to be medical advice or a substitute for the medical advice, diagnosis, or treatment of a health care provider based on the health care provider's examination and assessment of a patient's specific and unique circumstances. Patients must speak with a health care provider for complete information about their health, medical questions, and treatment options, including any risks or benefits regarding use of medications. This information does not endorse any treatments or medications as safe, effective, or approved for treating a specific patient. UpToDate, Inc. and its affiliates disclaim any warranty or liability relating to this information or the use thereof.The use of this information is governed by the Terms of Use, available at https://www.[employer].com/en/know/clinical-effectiveness-terms. 2024© UpToDate, Inc. and its affiliates and/or licensors. All rights reserved.  Copyright   © 2024 UpToDate, Inc. and/or its affiliates. All rights reserved.

## 2024-11-11 ENCOUNTER — TELEPHONE (OUTPATIENT)
Dept: FAMILY MEDICINE CLINIC | Facility: CLINIC | Age: 39
End: 2024-11-11

## 2024-11-11 ENCOUNTER — OFFICE VISIT (OUTPATIENT)
Dept: RHEUMATOLOGY | Facility: CLINIC | Age: 39
End: 2024-11-11
Payer: COMMERCIAL

## 2024-11-11 VITALS
DIASTOLIC BLOOD PRESSURE: 80 MMHG | SYSTOLIC BLOOD PRESSURE: 126 MMHG | OXYGEN SATURATION: 99 % | BODY MASS INDEX: 23.26 KG/M2 | HEART RATE: 97 BPM | WEIGHT: 153 LBS

## 2024-11-11 DIAGNOSIS — M45.0 ANKYLOSING SPONDYLITIS OF MULTIPLE SITES IN SPINE (HCC): Primary | ICD-10-CM

## 2024-11-11 DIAGNOSIS — Z79.60 LONG-TERM USE OF IMMUNOSUPPRESSANT MEDICATION: ICD-10-CM

## 2024-11-11 PROCEDURE — 99214 OFFICE O/P EST MOD 30 MIN: CPT | Performed by: STUDENT IN AN ORGANIZED HEALTH CARE EDUCATION/TRAINING PROGRAM

## 2024-11-11 NOTE — TELEPHONE ENCOUNTER
Patient called. Patient states he went to urgent care for a gum infection and was placed on an antibiotic. Patient states before going to urgent care, he had jaw pain and episodes where he would get hot and short of breath. Since seeing urgent care and taking medication his jaw is feeling better and he had one episode of feeling hot and short of breath today. Patient followed up with his dentist today who stated everything looked okay.    Patient was wondering if he should follow up with PCP as well.

## 2024-11-11 NOTE — PROGRESS NOTES
Ambulatory Visit  Name: Fransisco Ricardo      : 1985      MRN: [id number]  Encounter Provider: Cam Emerson DO  Encounter Date: 2024   Encounter department: [facility] [place]    Assessment & Plan  Ankylosing spondylitis of multiple sites in spine (HCC)  Has tolerated Enbrel well.  Will hold off this week dose given acute illness otherwise can resume next dose as prescribed. This was discussed  Continue Mobic as needed  Return to clinic 6 months         History of Present Illness     Fransisco Ricardo is a 38 y.o. male who presents for rheumatologic appointment follow-up.  Has history of ankylosing spondylitis on Enbrel, chronic pain syndrome with low back pain and associated radiculopathy.      Overall tolerating Enbrel well in conjunction with Mobic and tramadol at bedtime. No acute flairs. Most recently seems started with acute illness with associated mouth sore which had Augmentin for it.     Malachi any other symptoms such as dry eyes, dry mouth, dermatosis, odynophagia, dysphagia although that most recent illness with associated mild sore, denies hematuria, hematochezia, foamy urine.  No Raynaud symptoms    Permanent History: AS since his 20s on Enbrel for about 10 years; tried switching to Remicaide in 2020 due to concern for waning efficacy of Enbrel but developed burning sensation with each infusion so decided to switch back to Enbrel. Also on chronic NSAIDs       Review of Systems   Constitutional:  Negative for chills and fever.   HENT:  Positive for mouth sores. Negative for ear pain and sore throat.    Eyes:  Negative for pain and visual disturbance.   Respiratory:  Negative for cough and shortness of breath.    Cardiovascular:  Negative for chest pain and palpitations.   Gastrointestinal:  Negative for abdominal pain and vomiting.   Genitourinary:  Negative for dysuria and hematuria.   Musculoskeletal:  Negative for arthralgias and back pain.   Skin:  Negative for color  change and rash.   Neurological:  Negative for seizures and syncope.   All other systems reviewed and are negative.          Objective     /80 (BP Location: Left arm, Patient Position: Sitting, Cuff Size: Adult)   Pulse 97   Wt 69.4 kg (153 lb)   SpO2 99%   BMI 23.26 kg/m²     Physical Exam  Vitals and nursing note reviewed.   Constitutional:       General: He is not in acute distress.     Appearance: He is well-developed.   HENT:      Head: Normocephalic and atraumatic.   Eyes:      Conjunctiva/sclera: Conjunctivae normal.   Cardiovascular:      Rate and Rhythm: Normal rate and regular rhythm.      Heart sounds: No murmur heard.  Pulmonary:      Effort: Pulmonary effort is normal. No respiratory distress.      Breath sounds: Normal breath sounds.   Abdominal:      Palpations: Abdomen is soft.      Tenderness: There is no abdominal tenderness.   Musculoskeletal:         General: No swelling.      Cervical back: Neck supple.      Right lower leg: No edema.      Left lower leg: No edema.      Comments: Non TTP along spine. Chronic Flexion Thoraco lumbar spine   Skin:     General: Skin is warm and dry.      Capillary Refill: Capillary refill takes less than 2 seconds.   Neurological:      Mental Status: He is alert.   Psychiatric:         Mood and Affect: Mood normal.

## 2024-11-11 NOTE — ASSESSMENT & PLAN NOTE
Has tolerated Enbrel well.  Will hold off this week dose given acute illness otherwise can resume next dose as prescribed. This was discussed  Continue Mobic as needed  Return to clinic 6 months

## 2024-11-11 NOTE — PATIENT INSTRUCTIONS
It is okay to continue meloxicam while on antibioitics    While on your prescribed rheumatologic medication(s) Enbrel: you must STOP the medication if you fall ill (ex: a viral infection, bacterial infection) and not restart it until your illness is resolved and/or you are finished with any antibiotics/antivirals/antifungals that are prescribed for you, whichever happens last.    While on the medication(s), if you are to get a vaccine, you have to STOP the medication beforehand, and not restart it until at least 2 weeks after your vaccination. See below for how long to stop your medication according to how often you take it.    LIVE ATTENUATED VACCINES (ex: MMR, rotavirus, smallpox, chickenpox, yellow fever)    Hold before vaccine Hold after vaccine   Steroids 4 weeks 4 weeks   Methotrexate  Azathioprine  Sulfasalazine  Leflunomide  Mycophenolate  Cyclophosphamide (oral) 4 weeks    Calcineurin inhibitors such as:  Tacrolimus  Cyclosporine  Voclosporin 4 weeks    LAURA inhibitors such as:  Tofacitanib  Upadacitinib  Ruxolitinib  Baricitinib 1 week    TNF inhibitors such as:  Adalimumab and biosimilars  Infliximab  Etanercept  Golimumab  Certolizumab 1 dosing interval (ex: for an every 4 week medication, hold for 4 weeks)    IL-17 inhibitors such as:  Ixekizumab  Secukinumab 1 dosing interval (ex: for an every 4 week medication, hold for 4 weeks)    IL-12/23 inhibitors such as:  Ustekinumab 1 dosing interval (ex: for an every 4 week medication, hold for 4 weeks)    IL-23 inhibitors such as:  Guselkumab  Rizankizumab 1 dosing interval (ex: for an every 4 week medication, hold for 4 weeks)    Belimumab 1 dosing interval (ex: for an every 4 week medication, hold for 4 weeks)    IL-6 inhibitors such as:  Tocilizumab  Sarilumab 1 dosing interval (ex: for an every 4 week medication, hold for 4 weeks)    IL-1 inhibitors such as:  Anakinra  Rilonacept  Canakinumab 1 dosing interval (ex: for an every 4 week medication, hold for  4 weeks)    Abatacept 1 dosing interval (ex: for an every 4 week medication, hold for 4 weeks)    Cyclophosphamide (IV) 1 dosing interval (ex: for an every 4 week medication, hold for 4 weeks)    Rituximab 6 months    IVI-400 mg/kg  1000 mg/kg  2000 mg/kg   8 months  10 months  11 months      INFLUENZA VACCINE    Hold before vaccine Hold after vaccine   Methotrexate 1 week 2 weeks if able   Rituximab  n/a 2 weeks   All others CONTINUE, DO NOT HOLD CONTINUE, DO NOT HOLD     ALL OTHER VACCINES    Hold before vaccine Hold after vaccine   Methotrexate CONTINUE, DO NOT HOLD CONTINUE, DO NOT HOLD   Rituximab  n/a Time vaccination for when next dose is due, then give rituximab at least 2 weeks after vaccine   All others CONTINUE, DO NOT HOLD CONTINUE, DO NOT HOLD

## 2024-11-13 ENCOUNTER — HOSPITAL ENCOUNTER (OUTPATIENT)
Facility: HOSPITAL | Age: 39
Setting detail: OBSERVATION
Discharge: HOME | End: 2024-11-14
Attending: EMERGENCY MEDICINE | Admitting: STUDENT IN AN ORGANIZED HEALTH CARE EDUCATION/TRAINING PROGRAM
Payer: COMMERCIAL

## 2024-11-13 ENCOUNTER — APPOINTMENT (EMERGENCY)
Dept: RADIOLOGY | Facility: HOSPITAL | Age: 39
End: 2024-11-13
Payer: COMMERCIAL

## 2024-11-13 DIAGNOSIS — R07.9 CHEST PAIN: Primary | ICD-10-CM

## 2024-11-13 DIAGNOSIS — R94.31 ABNORMAL ECG: ICD-10-CM

## 2024-11-13 DIAGNOSIS — F17.200 SMOKES TOBACCO DAILY: ICD-10-CM

## 2024-11-13 DIAGNOSIS — R68.84 JAW PAIN: ICD-10-CM

## 2024-11-13 PROBLEM — R07.89 OTHER CHEST PAIN: Status: ACTIVE | Noted: 2024-11-13

## 2024-11-13 LAB
2HR DELTA HS TROPONIN: 0 NG/L
4HR DELTA HS TROPONIN: 0 NG/L
ALBUMIN SERPL BCG-MCNC: 3.9 G/DL (ref 3.5–5)
ALP SERPL-CCNC: 84 U/L (ref 34–104)
ALT SERPL W P-5'-P-CCNC: 12 U/L (ref 7–52)
ANION GAP SERPL CALCULATED.3IONS-SCNC: 6 MMOL/L (ref 4–13)
AST SERPL W P-5'-P-CCNC: 10 U/L (ref 13–39)
ATRIAL RATE: 69 BPM
ATRIAL RATE: 95 BPM
BASOPHILS # BLD AUTO: 0.03 THOUSANDS/ÂΜL (ref 0–0.1)
BASOPHILS NFR BLD AUTO: 0 % (ref 0–1)
BILIRUB DIRECT SERPL-MCNC: 0.1 MG/DL (ref 0–0.2)
BILIRUB SERPL-MCNC: 0.41 MG/DL (ref 0.2–1)
BUN SERPL-MCNC: 16 MG/DL (ref 5–25)
CALCIUM SERPL-MCNC: 9 MG/DL (ref 8.4–10.2)
CARDIAC TROPONIN I PNL SERPL HS: 3 NG/L (ref ?–50)
CHLORIDE SERPL-SCNC: 103 MMOL/L (ref 96–108)
CO2 SERPL-SCNC: 30 MMOL/L (ref 21–32)
CREAT SERPL-MCNC: 0.99 MG/DL (ref 0.6–1.3)
CRP SERPL QL: 13.1 MG/L
D DIMER PPP FEU-MCNC: <0.27 UG/ML FEU
EOSINOPHIL # BLD AUTO: 0.07 THOUSAND/ÂΜL (ref 0–0.61)
EOSINOPHIL NFR BLD AUTO: 1 % (ref 0–6)
ERYTHROCYTE [DISTWIDTH] IN BLOOD BY AUTOMATED COUNT: 12.5 % (ref 11.6–15.1)
ERYTHROCYTE [SEDIMENTATION RATE] IN BLOOD: 24 MM/HOUR (ref 0–14)
GFR SERPL CREATININE-BSD FRML MDRD: 97 ML/MIN/1.73SQ M
GLUCOSE SERPL-MCNC: 89 MG/DL (ref 65–140)
HCT VFR BLD AUTO: 45.1 % (ref 36.5–49.3)
HGB BLD-MCNC: 14.6 G/DL (ref 12–17)
IMM GRANULOCYTES # BLD AUTO: 0.04 THOUSAND/UL (ref 0–0.2)
IMM GRANULOCYTES NFR BLD AUTO: 0 % (ref 0–2)
LYMPHOCYTES # BLD AUTO: 0.94 THOUSANDS/ÂΜL (ref 0.6–4.47)
LYMPHOCYTES NFR BLD AUTO: 8 % (ref 14–44)
MCH RBC QN AUTO: 27.3 PG (ref 26.8–34.3)
MCHC RBC AUTO-ENTMCNC: 32.4 G/DL (ref 31.4–37.4)
MCV RBC AUTO: 84 FL (ref 82–98)
MONOCYTES # BLD AUTO: 0.86 THOUSAND/ÂΜL (ref 0.17–1.22)
MONOCYTES NFR BLD AUTO: 7 % (ref 4–12)
NEUTROPHILS # BLD AUTO: 9.94 THOUSANDS/ÂΜL (ref 1.85–7.62)
NEUTS SEG NFR BLD AUTO: 84 % (ref 43–75)
NRBC BLD AUTO-RTO: 0 /100 WBCS
P AXIS: 77 DEGREES
P AXIS: 78 DEGREES
PLATELET # BLD AUTO: 346 THOUSANDS/UL (ref 149–390)
PMV BLD AUTO: 8.3 FL (ref 8.9–12.7)
POTASSIUM SERPL-SCNC: 4 MMOL/L (ref 3.5–5.3)
PR INTERVAL: 118 MS
PR INTERVAL: 124 MS
PROT SERPL-MCNC: 6.7 G/DL (ref 6.4–8.4)
QRS AXIS: 91 DEGREES
QRS AXIS: 92 DEGREES
QRSD INTERVAL: 100 MS
QRSD INTERVAL: 98 MS
QT INTERVAL: 338 MS
QT INTERVAL: 394 MS
QTC INTERVAL: 422 MS
QTC INTERVAL: 424 MS
RBC # BLD AUTO: 5.35 MILLION/UL (ref 3.88–5.62)
SODIUM SERPL-SCNC: 139 MMOL/L (ref 135–147)
T WAVE AXIS: 44 DEGREES
T WAVE AXIS: 75 DEGREES
VENTRICULAR RATE: 69 BPM
VENTRICULAR RATE: 95 BPM
WBC # BLD AUTO: 11.88 THOUSAND/UL (ref 4.31–10.16)

## 2024-11-13 PROCEDURE — 99221 1ST HOSP IP/OBS SF/LOW 40: CPT | Performed by: STUDENT IN AN ORGANIZED HEALTH CARE EDUCATION/TRAINING PROGRAM

## 2024-11-13 PROCEDURE — 93005 ELECTROCARDIOGRAM TRACING: CPT

## 2024-11-13 PROCEDURE — 99285 EMERGENCY DEPT VISIT HI MDM: CPT

## 2024-11-13 PROCEDURE — 85379 FIBRIN DEGRADATION QUANT: CPT | Performed by: EMERGENCY MEDICINE

## 2024-11-13 PROCEDURE — 80048 BASIC METABOLIC PNL TOTAL CA: CPT | Performed by: EMERGENCY MEDICINE

## 2024-11-13 PROCEDURE — 85652 RBC SED RATE AUTOMATED: CPT | Performed by: STUDENT IN AN ORGANIZED HEALTH CARE EDUCATION/TRAINING PROGRAM

## 2024-11-13 PROCEDURE — 80076 HEPATIC FUNCTION PANEL: CPT | Performed by: EMERGENCY MEDICINE

## 2024-11-13 PROCEDURE — 85025 COMPLETE CBC W/AUTO DIFF WBC: CPT | Performed by: EMERGENCY MEDICINE

## 2024-11-13 PROCEDURE — 99284 EMERGENCY DEPT VISIT MOD MDM: CPT | Performed by: EMERGENCY MEDICINE

## 2024-11-13 PROCEDURE — 36415 COLL VENOUS BLD VENIPUNCTURE: CPT | Performed by: EMERGENCY MEDICINE

## 2024-11-13 PROCEDURE — 71046 X-RAY EXAM CHEST 2 VIEWS: CPT

## 2024-11-13 PROCEDURE — 93010 ELECTROCARDIOGRAM REPORT: CPT | Performed by: INTERNAL MEDICINE

## 2024-11-13 PROCEDURE — 84484 ASSAY OF TROPONIN QUANT: CPT | Performed by: EMERGENCY MEDICINE

## 2024-11-13 PROCEDURE — 86140 C-REACTIVE PROTEIN: CPT | Performed by: STUDENT IN AN ORGANIZED HEALTH CARE EDUCATION/TRAINING PROGRAM

## 2024-11-13 RX ORDER — ASPIRIN 325 MG
325 TABLET ORAL ONCE
Status: COMPLETED | OUTPATIENT
Start: 2024-11-13 | End: 2024-11-13

## 2024-11-13 RX ORDER — MELOXICAM 7.5 MG/1
15 TABLET ORAL DAILY PRN
Status: DISCONTINUED | OUTPATIENT
Start: 2024-11-13 | End: 2024-11-14 | Stop reason: HOSPADM

## 2024-11-13 RX ORDER — FAMOTIDINE 20 MG/1
20 TABLET, FILM COATED ORAL
Status: DISCONTINUED | OUTPATIENT
Start: 2024-11-13 | End: 2024-11-14 | Stop reason: HOSPADM

## 2024-11-13 RX ORDER — METHOCARBAMOL 500 MG/1
500 TABLET, FILM COATED ORAL
Status: DISCONTINUED | OUTPATIENT
Start: 2024-11-13 | End: 2024-11-14 | Stop reason: HOSPADM

## 2024-11-13 RX ORDER — TRAMADOL HYDROCHLORIDE 50 MG/1
50 TABLET ORAL EVERY 6 HOURS PRN
Status: DISCONTINUED | OUTPATIENT
Start: 2024-11-13 | End: 2024-11-14 | Stop reason: HOSPADM

## 2024-11-13 RX ORDER — NICOTINE 21 MG/24HR
1 PATCH, TRANSDERMAL 24 HOURS TRANSDERMAL DAILY
Status: DISCONTINUED | OUTPATIENT
Start: 2024-11-14 | End: 2024-11-14 | Stop reason: HOSPADM

## 2024-11-13 RX ADMIN — ASPIRIN 325 MG ORAL TABLET 325 MG: 325 PILL ORAL at 14:42

## 2024-11-13 RX ADMIN — MELOXICAM 15 MG: 7.5 TABLET ORAL at 20:39

## 2024-11-13 RX ADMIN — FAMOTIDINE 20 MG: 20 TABLET, FILM COATED ORAL at 20:39

## 2024-11-13 NOTE — H&P
H&P - Hospitalist   Name: Fransisco Ricardo 38 y.o. male I MRN: [id number]  Unit/Bed#: -01 I Date of Admission: 11/13/2024   Date of Service: 11/13/2024 I Hospital Day: 0     Assessment & Plan  Other chest pain  Patient presenting with 5 days of intermittent chest pain, began Friday  He is unable to quantify the pain but reports it is substernal  Trops negative x 3  Cardiology consult placed in ED  Follow-up echocardiogram  Possible other etiologies include asthma/bronchitis but patient denies hx of these  Abnormal EKG  Initial EKG in ED showing ST depressions, Case discussed with on-call cardiology who recommended observation  Repeat EKG normal  AS (ankylosing spondylitis) ([facility])  Takes Enbrel weekly, skipped this past Sunday dose because of concerns over dental infection  Resume outpatient this weekend  Continue home pain medication with tramadol, meloxicam  Smokes tobacco daily  Nicotine patch ordered      VTE Pharmacologic Prophylaxis: VTE Score: 0 Low Risk (Score 0-2) - Encourage Ambulation.  Code Status: Full Code  Discussion with family: Patient declined call to .     Anticipated Length of Stay: Patient will be admitted on an observation basis with an anticipated length of stay of less than 2 midnights secondary to chest pain.    History of Present Illness   Chief Complaint: chest pain    Fransisco Ricardo is a 38 y.o. male with a PMH of ankylosing spondylitis who presents with chest pain. His symptoms started last Friday.  He also had gum and jaw pain, so he tried to go to the dentist who was closed.  Instead, he went to a local urgent care and was prescribed antibiotics for presumed dental infection.  He was also taking acetaminophen for pain.  Dentist evaluated him on Monday and took an Xray, said nothing was wrong from dental perspective.  He was also evaluated by his rheumatologist on Monday who he follows with for ankylosing spondylitis.  They did not think anything was wrong either.  Today, NEEL  recurred at work and was associated with SOB.  CP is substernal, non-radiating today, feels like he had associated tachycardia and palpitations with sweats and chills, lasting minutes.  He is unable to quantify the pain.  He denies symptoms with positional changes. He is a  and is unsure if he has any occupational exposures.  He denies long travel or periods of immobility.  He takes Enbrel weekly for ~15 years.  He denies LE edema, swelling, or erythema.  No rashes.   Family history of heart disease on father's side, patient doesn't know details.  Patient is a half pack per day smoker.    Review of Systems   Constitutional:  Positive for chills and diaphoresis. Negative for fatigue and fever.   HENT:  Negative for facial swelling.    Respiratory:  Positive for shortness of breath. Negative for cough and wheezing.    Cardiovascular:  Positive for chest pain and palpitations. Negative for leg swelling.   Gastrointestinal:  Negative for abdominal pain, constipation, diarrhea, nausea and vomiting.   Musculoskeletal:         Chronic back pain associated with AS.    Neurological:  Negative for syncope.       Historical Information   Past Medical History:   Diagnosis Date    Ankylosing spondylitis (HCC)     Arthritis     AS (ankylosing spondylitis) (HCC) 07/21/2017    Encounter for monitoring of etanercept therapy 01/07/2019    Kidney stone      Past Surgical History:   Procedure Laterality Date    [place] ARTHRS HIP DEBRIDEMENT/SHAVING ARTICULAR CRTLG Left 6/1/2017    Procedure: LEFT HIP ARTHROSCOPIC LABRAL DEBRIDEMENT;  Surgeon: Oscar Ferrara MD;  Location: [facility];  Service: Orthopedics    WISDOM TOOTH EXTRACTION       Social History     Tobacco Use    Smoking status: Every Day     Current packs/day: 0.50     Average packs/day: 0.5 packs/day for 15.0 years (7.5 ttl pk-yrs)     Types: Cigarettes    Smokeless tobacco: Never   Vaping Use    Vaping status: Never Used   Substance and Sexual Activity    Alcohol use: No     Drug use: Not Currently     Types: Marijuana    Sexual activity: Yes     Partners: Female     Birth control/protection: Condom Male     E-Cigarette/Vaping    E-Cigarette Use Never User      E-Cigarette/Vaping Substances    Nicotine No     THC No     CBD No     Flavoring No     Other No     Unknown No      Family History   Problem Relation Age of Onset    Colon polyps Father     No Known Problems Maternal Grandmother     No Known Problems Maternal Grandfather     No Known Problems Paternal Grandmother     Skin cancer Paternal Grandfather      Social History:  Marital Status: Single   Occupation:   Patient Pre-hospital Living Situation: Home  Patient Pre-hospital Level of Mobility: walks  Patient Pre-hospital Diet Restrictions: none    Meds/Allergies   I have reviewed home medications with patient personally.  Prior to Admission medications    Medication Sig Start Date End Date Taking? Authorizing Provider   amoxicillin-clavulanate (AUGMENTIN) 875-125 mg per tablet Take 1 tablet by mouth 2 (two) times a day 11/8/24   Historical Provider, MD   calcium carbonate (TUMS) 500 mg chewable tablet Chew 1 tablet daily    Historical Provider, MD   ergocalciferol (VITAMIN D2) 50,000 units take 1 capsule by mouth every week 4/7/24   Yanira Ibrahim DO   etanercept (ENBREL SURECLICK) 50 MG/ML injection Inject 1 mL (50 mg total) under the skin every 7 days 3/14/24   Cam Emerson DO   famotidine (PEPCID) 20 mg tablet take 1 tablet by mouth once daily 7/22/24   Quyen Foster MD   meloxicam (MOBIC) 15 mg tablet take 1 tablet by mouth once daily AS NEEDED FOR ANY JOINT PAIN 6/20/24   Cam Emerson DO   methocarbamol (ROBAXIN) 500 mg tablet Take 1 tablet (500 mg total) by mouth daily at bedtime as needed for muscle spasms 5/31/23   Micah Harry MD   NIFEdipine 0.3%-lidocaine 5% rectal ointment Apply 1 Application topically every 4 (four) hours as needed for discomfort or pain Apply a small amount to anal fissure 4/4/24    LUCIUS Junior MD   traMADol (ULTRAM) 50 mg tablet Take 1 tablet (50 mg total) by mouth in the morning Take 1 tablet daily prn pain 8/13/24   MARIO Beck     Allergies   Allergen Reactions    Other Other (See Comments)     Raw fruits/veggies.        Objective :  Temp:  [97.8 °F (36.6 °C)-98.2 °F (36.8 °C)] 98.2 °F (36.8 °C)  HR:  [] 71  BP: (109-135)/(65-84) 120/66  Resp:  [16-18] 18  SpO2:  [99 %-100 %] 99 %  O2 Device: None (Room air)    Physical Exam  Constitutional:       General: He is not in acute distress.     Appearance: Normal appearance. He is not ill-appearing.   HENT:      Mouth/Throat:      Mouth: Mucous membranes are moist.      Pharynx: No posterior oropharyngeal erythema.   Eyes:      Extraocular Movements: Extraocular movements intact.      Pupils: Pupils are equal, round, and reactive to light.   Cardiovascular:      Rate and Rhythm: Normal rate and regular rhythm.      Heart sounds: No murmur heard.  Pulmonary:      Effort: Pulmonary effort is normal. No respiratory distress.      Breath sounds: No wheezing, rhonchi or rales.   Abdominal:      General: Abdomen is flat. There is no distension.      Palpations: Abdomen is soft.      Tenderness: There is no abdominal tenderness. There is no guarding or rebound.   Musculoskeletal:      Right lower leg: No edema.      Left lower leg: No edema.   Lymphadenopathy:      Cervical: No cervical adenopathy.   Neurological:      General: No focal deficit present.      Mental Status: He is alert.        Lines/Drains:        Lab Results: I have reviewed the following results:  Results from last 7 days   Lab Units 11/13/24  1055   WBC Thousand/uL 11.88*   HEMOGLOBIN g/dL 14.6   HEMATOCRIT % 45.1   PLATELETS Thousands/uL 346   SEGS PCT % 84*   LYMPHO PCT % 8*   MONO PCT % 7   EOS PCT % 1     Results from last 7 days   Lab Units 11/13/24  1055   SODIUM mmol/L 139   POTASSIUM mmol/L 4.0   CHLORIDE mmol/L 103   CO2 mmol/L 30   BUN mg/dL 16  "  CREATININE mg/dL 0.99   ANION GAP mmol/L 6   CALCIUM mg/dL 9.0   ALBUMIN g/dL 3.9   TOTAL BILIRUBIN mg/dL 0.41   ALK PHOS U/L 84   ALT U/L 12   AST U/L 10*   GLUCOSE RANDOM mg/dL 89             No results found for: \"HGBA1C\"              Administrative Statements       ** Please Note: This note has been constructed using a voice recognition system. **    "

## 2024-11-13 NOTE — ASSESSMENT & PLAN NOTE
Initial EKG in ED showing ST depressions, Case discussed with on-call cardiology who recommended observation  Repeat EKG normal

## 2024-11-13 NOTE — ASSESSMENT & PLAN NOTE
Patient presenting with 5 days of intermittent chest pain, began Friday  He is unable to quantify the pain but reports it is substernal  Trops negative x 3  Cardiology consult placed in ED  Follow-up echocardiogram  Possible other etiologies include asthma/bronchitis but patient denies hx of these

## 2024-11-13 NOTE — ASSESSMENT & PLAN NOTE
Takes Enbrel weekly, skipped this past Sunday dose because of concerns over dental infection  Resume outpatient this weekend  Continue home pain medication with tramadol, meloxicam

## 2024-11-13 NOTE — ED PROVIDER NOTES
Time reflects when diagnosis was documented in both MDM as applicable and the Disposition within this note       Time User Action Codes Description Comment    11/13/2024  2:22 PM Annabelle Tom [R07.9] Chest pain     11/13/2024  2:22 PM Annabelle Tom [R94.31] Abnormal ECG           ED Disposition       ED Disposition   Admit    Condition   Stable    Date/Time   Wed Nov 13, 2024  2:22 PM    Comment   Case was discussed with rhea and the patient's admission status was agreed to be observation Status: inpatient status to the service of Dr. Moser .               Assessment & Plan       Medical Decision Making  Pt with chest pain, will do EKG to evaluate for ischemia, troponin to evaluate for cardiac injury, D-dimer to evaluate for pulmonary embolus, check CBC to eval for anemia, check CMP to evaluate electrolyte abnormalities, chest x-ray to eval for pneumonia or pneumothorax.    Patient initial EKG concerning for ischemia.  Discussed with cardiologist who agrees.  Will admit for observation and continued evaluation.    Initial EKG shows rate of 95, sinus, normal intervals, normal QRS, ST depressions inferiorly, laterally.  No ST elevations.  Independently inter by me    Repeat ECG shows correction of the ST depressions.    Amount and/or Complexity of Data Reviewed  Labs: ordered.  Radiology: ordered.    Risk  OTC drugs.  Decision regarding hospitalization.             Medications   aspirin tablet 325 mg (325 mg Oral Given 11/13/24 1442)       ED Risk Strat Scores                                               History of Present Illness       Chief Complaint   Patient presents with    Chest Pain     Since Friday morning pt has intermittent CP, SOB, palpitations. Went to urgent care and given penicillin for mouth infection. +jaw pain.        Past Medical History:   Diagnosis Date    Ankylosing spondylitis (HCC)     Arthritis     AS (ankylosing spondylitis) (HCC) 07/21/2017    Encounter for monitoring of  etanercept therapy 01/07/2019    Kidney stone       Past Surgical History:   Procedure Laterality Date    [place] ARTHRS HIP DEBRIDEMENT/SHAVING ARTICULAR CRTLG Left 6/1/2017    Procedure: LEFT HIP ARTHROSCOPIC LABRAL DEBRIDEMENT;  Surgeon: Oscar Ferrara MD;  Location[facility];  Service: Orthopedics    WISDOM TOOTH EXTRACTION        Family History   Problem Relation Age of Onset    Colon polyps Father     No Known Problems Maternal Grandmother     No Known Problems Maternal Grandfather     No Known Problems Paternal Grandmother     Skin cancer Paternal Grandfather       Social History     Tobacco Use    Smoking status: Every Day     Current packs/day: 0.50     Average packs/day: 0.5 packs/day for 15.0 years (7.5 ttl pk-yrs)     Types: Cigarettes    Smokeless tobacco: Never   Vaping Use    Vaping status: Never Used   Substance Use Topics    Alcohol use: No    Drug use: Not Currently     Types: Marijuana      E-Cigarette/Vaping    E-Cigarette Use Never User       E-Cigarette/Vaping Substances    Nicotine No     THC No     CBD No     Flavoring No     Other No     Unknown No       I have reviewed and agree with the history as documented.     38-year-old presented to the ER due to intermittent chest pain and shortness of breath that has been happening for the last 5 days.  Feels like pressure and heaviness.  Last week was having jaw pain, saw dentist who stated it was not from teeth.  Unsure of cause.  No nausea vomiting.  No abdominal pain.  No lightness or dizziness.  No fevers or chills.      History provided by:  Patient   used: No    Chest Pain  Associated symptoms: shortness of breath    Associated symptoms: no abdominal pain, no cough, no diaphoresis, no dizziness, no fever, no headache, no nausea, no palpitations, not vomiting and no weakness        Review of Systems   Constitutional:  Negative for chills, diaphoresis and fever.   Respiratory:  Positive for shortness of breath. Negative for  cough, wheezing and stridor.    Cardiovascular:  Positive for chest pain. Negative for palpitations and leg swelling.   Gastrointestinal:  Negative for abdominal pain, blood in stool, diarrhea, nausea and vomiting.   Genitourinary:  Negative for dysuria, frequency and urgency.   Musculoskeletal:  Negative for neck pain and neck stiffness.   Skin:  Negative for pallor and rash.   Neurological:  Negative for dizziness, syncope, weakness, light-headedness and headaches.   All other systems reviewed and are negative.          Objective       ED Triage Vitals [11/13/24 0918]   Temperature Pulse Blood Pressure Respirations SpO2 Patient Position - Orthostatic VS   97.8 °F (36.6 °C) (!) 114 135/84 16 100 % Sitting      Temp Source Heart Rate Source BP Location FiO2 (%) Pain Score    Oral Monitor Right arm -- 1      Vitals      Date and Time Temp Pulse SpO2 Resp BP Pain Score FACES Pain Rating User   11/13/24 1430 -- 72 99 % 16 125/72 -- -- JDT   11/13/24 1330 -- 61 100 % 16 109/68 -- -- JDT   11/13/24 1130 -- 60 99 % 16 117/65 -- -- LD   11/13/24 0918 97.8 °F (36.6 °C) 114 100 % 16 135/84 1 -- LD            Physical Exam  Vitals reviewed.   Constitutional:       Appearance: He is well-developed.   HENT:      Head: Normocephalic and atraumatic.   Eyes:      Pupils: Pupils are equal, round, and reactive to light.   Cardiovascular:      Rate and Rhythm: Normal rate and regular rhythm.      Heart sounds: Normal heart sounds.   Pulmonary:      Effort: Pulmonary effort is normal. No respiratory distress.      Breath sounds: Normal breath sounds.   Abdominal:      General: Bowel sounds are normal.      Palpations: Abdomen is soft.      Tenderness: There is no abdominal tenderness.   Musculoskeletal:         General: Normal range of motion.      Cervical back: Neck supple.      Right lower leg: No tenderness. No edema.      Left lower leg: No tenderness. No edema.   Skin:     General: Skin is warm and dry.      Capillary Refill:  Capillary refill takes less than 2 seconds.   Neurological:      Mental Status: He is alert and oriented to person, place, and time.         Results Reviewed       Procedure Component Value Units Date/Time    HS Troponin I 2hr [[id number]]  (Normal) Collected: 11/13/24 1302    Lab Status: Final result Specimen: Blood from Arm, Left Updated: 11/13/24 1333     hs TnI 2hr 3 ng/L      Delta 2hr hsTnI 0 ng/L     HS Troponin I 4hr [[id number]]     Lab Status: No result Specimen: Blood     D-Dimer [[id number]]  (Normal) Collected: 11/13/24 1055    Lab Status: Final result Specimen: Blood from Arm, Left Updated: 11/13/24 1200     D-Dimer, Quant <0.27 ug/ml FEU     HS Troponin 0hr (reflex protocol) [[id number]]  (Normal) Collected: 11/13/24 1055    Lab Status: Final result Specimen: Blood from Arm, Left Updated: 11/13/24 1130     hs TnI 0hr 3 ng/L     Basic metabolic panel [[id number]] Collected: 11/13/24 1055    Lab Status: Final result Specimen: Blood from Arm, Left Updated: 11/13/24 1127     Sodium 139 mmol/L      Potassium 4.0 mmol/L      Chloride 103 mmol/L      CO2 30 mmol/L      ANION GAP 6 mmol/L      BUN 16 mg/dL      Creatinine 0.99 mg/dL      Glucose 89 mg/dL      Calcium 9.0 mg/dL      eGFR 97 ml/min/1.73sq m     Narrative:      National Kid[employer] Foundation guidelines for Chronic Kidney Disease (CKD):     Stage 1 with normal or high GFR (GFR > 90 mL/min/1.73 square meters)    Stage 2 Mild CKD (GFR = 60-89 mL/min/1.73 square meters)    Stage 3A Moderate CKD (GFR = 45-59 mL/min/1.73 square meters)    Stage 3B Moderate CKD (GFR = 30-44 mL/min/1.73 square meters)    Stage 4 Severe CKD (GFR = 15-29 mL/min/1.73 square meters)    Stage 5 End Stage CKD (GFR <15 mL/min/1.73 square meters)  Note: GFR calculation is accurate only with a steady state creatinine    Hepatic function panel [[id number]]  (Abnormal) Collected: 11/13/24 1055    Lab Status: Final result Specimen: Blood from Arm, Left Updated: 11/13/24 1125      Total Bilirubin 0.41 mg/dL      Bilirubin, Direct 0.10 mg/dL      Alkaline Phosphatase 84 U/L      AST 10 U/L      ALT 12 U/L      Total Protein 6.7 g/dL      Albumin 3.9 g/dL     CBC and differential [[id number]]  (Abnormal) Collected: 11/13/24 1055    Lab Status: Final result Specimen: Blood from Arm, Left Updated: 11/13/24 1110     WBC 11.88 Thousand/uL      RBC 5.35 Million/uL      Hemoglobin 14.6 g/dL      Hematocrit 45.1 %      MCV 84 fL      MCH 27.3 pg      MCHC 32.4 g/dL      RDW 12.5 %      MPV 8.3 fL      Platelets 346 Thousands/uL      nRBC 0 /100 WBCs      Segmented % 84 %      Immature Grans % 0 %      Lymphocytes % 8 %      Monocytes % 7 %      Eosinophils Relative 1 %      Basophils Relative 0 %      Absolute Neutrophils 9.94 Thousands/µL      Absolute Immature Grans 0.04 Thousand/uL      Absolute Lymphocytes 0.94 Thousands/µL      Absolute Monocytes 0.86 Thousand/µL      Eosinophils Absolute 0.07 Thousand/µL      Basophils Absolute 0.03 Thousands/µL             XR chest 2 views   Final Interpretation by Mikel Cooper MD (11/13 1315)      No acute cardiopulmonary disease.            Resident: REJI Garcia I, the attending radiologist, have reviewed the images and agree with the final report above.      Workstation performed: [id number]             Procedures    ED Medication and Procedure Management   Prior to Admission Medications   Prescriptions Last Dose Informant Patient Reported? Taking?   NIFEdipine 0.3%-lidocaine 5% rectal ointment  Self No No   Sig: Apply 1 Application topically every 4 (four) hours as needed for discomfort or pain Apply a small amount to anal fissure   amoxicillin-clavulanate (AUGMENTIN) 875-125 mg per tablet   Yes No   Sig: Take 1 tablet by mouth 2 (two) times a day   calcium carbonate (TUMS) 500 mg chewable tablet  Self Yes No   Sig: Chew 1 tablet daily   ergocalciferol (VITAMIN D2) 50,000 units  Self No No   Sig: take 1 capsule by mouth every week   etanercept  (ENBREL SURECLICK) 50 MG/ML injection  Self No No   Sig: Inject 1 mL (50 mg total) under the skin every 7 days   famotidine (PEPCID) 20 mg tablet  Self No No   Sig: take 1 tablet by mouth once daily   meloxicam (MOBIC) 15 mg tablet  Self No No   Sig: take 1 tablet by mouth once daily AS NEEDED FOR ANY JOINT PAIN   methocarbamol (ROBAXIN) 500 mg tablet  Self No No   Sig: Take 1 tablet (500 mg total) by mouth daily at bedtime as needed for muscle spasms   traMADol (ULTRAM) 50 mg tablet   No No   Sig: Take 1 tablet (50 mg total) by mouth in the morning Take 1 tablet daily prn pain      Facility-Administered Medications: None     Patient's Medications   Discharge Prescriptions    No medications on file     No discharge procedures on file.  ED SEPSIS DOCUMENTATION   Time reflects when diagnosis was documented in both MDM as applicable and the Disposition within this note       Time User Action Codes Description Comment    11/13/2024  2:22 PM Annabelle Tom [R07.9] Chest pain     11/13/2024  2:22 PM Annabelle Tom [R94.31] Abnormal ECG                  Annabelle Tom MD  11/13/24 0394

## 2024-11-14 ENCOUNTER — APPOINTMENT (OUTPATIENT)
Dept: NON INVASIVE DIAGNOSTICS | Facility: HOSPITAL | Age: 39
End: 2024-11-14
Payer: COMMERCIAL

## 2024-11-14 VITALS
HEIGHT: 71 IN | RESPIRATION RATE: 18 BRPM | SYSTOLIC BLOOD PRESSURE: 120 MMHG | DIASTOLIC BLOOD PRESSURE: 69 MMHG | OXYGEN SATURATION: 99 % | WEIGHT: 147 LBS | BODY MASS INDEX: 20.58 KG/M2 | TEMPERATURE: 98.1 F | HEART RATE: 68 BPM

## 2024-11-14 DIAGNOSIS — R00.2 PALPITATIONS: Primary | ICD-10-CM

## 2024-11-14 PROBLEM — R68.84 JAW PAIN: Status: ACTIVE | Noted: 2024-11-14

## 2024-11-14 LAB
AORTIC ROOT: 3.4 CM
APICAL FOUR CHAMBER EJECTION FRACTION: 57 %
ASCENDING AORTA: 2.7 CM
BSA FOR ECHO PROCEDURE: 1.85 M2
E WAVE DECELERATION TIME: 198 MS
E/A RATIO: 1.76
FRACTIONAL SHORTENING: 38 (ref 28–44)
INTERVENTRICULAR SEPTUM IN DIASTOLE (PARASTERNAL SHORT AXIS VIEW): 0.8 CM
INTERVENTRICULAR SEPTUM: 0.8 CM (ref 0.6–1.1)
LAAS-AP2: 12.2 CM2
LAAS-AP4: 10.4 CM2
LEFT ATRIUM AREA SYSTOLE SINGLE PLANE A4C: 10.2 CM2
LEFT ATRIUM SIZE: 2.8 CM
LEFT ATRIUM VOLUME (MOD BIPLANE): 23 ML
LEFT ATRIUM VOLUME INDEX (MOD BIPLANE): 12.4 ML/M2
LEFT INTERNAL DIMENSION IN SYSTOLE: 2.9 CM (ref 2.1–4)
LEFT VENTRICULAR INTERNAL DIMENSION IN DIASTOLE: 4.7 CM (ref 3.5–6)
LEFT VENTRICULAR POSTERIOR WALL IN END DIASTOLE: 0.7 CM
LEFT VENTRICULAR STROKE VOLUME: 71 ML
LVSV (TEICH): 71 ML
MV E'TISSUE VEL-SEP: 15 CM/S
MV PEAK A VEL: 0.38 M/S
MV PEAK E VEL: 67 CM/S
MV STENOSIS PRESSURE HALF TIME: 57 MS
MV VALVE AREA P 1/2 METHOD: 3.86
RA PRESSURE ESTIMATED: 3 MMHG
RIGHT ATRIAL 2D VOLUME: 25 ML
RIGHT ATRIUM AREA SYSTOLE A4C: 10.9 CM2
RIGHT VENTRICLE ID DIMENSION: 4.2 CM
RV PSP: 11 MMHG
SL CV LEFT ATRIUM LENGTH A2C: 4.5 CM
SL CV LV EF: 55
SL CV PED ECHO LEFT VENTRICLE DIASTOLIC VOLUME (MOD BIPLANE) 2D: 104 ML
SL CV PED ECHO LEFT VENTRICLE SYSTOLIC VOLUME (MOD BIPLANE) 2D: 33 ML
TR MAX PG: 8 MMHG
TR PEAK VELOCITY: 1.4 M/S
TRICUSPID ANNULAR PLANE SYSTOLIC EXCURSION: 2 CM
TRICUSPID VALVE PEAK REGURGITATION VELOCITY: 1.41 M/S

## 2024-11-14 PROCEDURE — 99239 HOSP IP/OBS DSCHRG MGMT >30: CPT | Performed by: PHYSICIAN ASSISTANT

## 2024-11-14 PROCEDURE — 93306 TTE W/DOPPLER COMPLETE: CPT

## 2024-11-14 PROCEDURE — 99205 OFFICE O/P NEW HI 60 MIN: CPT | Performed by: INTERNAL MEDICINE

## 2024-11-14 PROCEDURE — 93306 TTE W/DOPPLER COMPLETE: CPT | Performed by: INTERNAL MEDICINE

## 2024-11-14 RX ORDER — MAGNESIUM HYDROXIDE/ALUMINUM HYDROXICE/SIMETHICONE 120; 1200; 1200 MG/30ML; MG/30ML; MG/30ML
30 SUSPENSION ORAL EVERY 4 HOURS PRN
Start: 2024-11-14 | End: 2024-11-19 | Stop reason: ALTCHOICE

## 2024-11-14 RX ORDER — PREDNISONE 20 MG/1
40 TABLET ORAL DAILY
Status: DISCONTINUED | OUTPATIENT
Start: 2024-11-14 | End: 2024-11-14 | Stop reason: HOSPADM

## 2024-11-14 RX ORDER — MAGNESIUM HYDROXIDE/ALUMINUM HYDROXICE/SIMETHICONE 120; 1200; 1200 MG/30ML; MG/30ML; MG/30ML
30 SUSPENSION ORAL EVERY 4 HOURS PRN
Status: DISCONTINUED | OUTPATIENT
Start: 2024-11-14 | End: 2024-11-14 | Stop reason: HOSPADM

## 2024-11-14 RX ORDER — PREDNISONE 20 MG/1
40 TABLET ORAL DAILY
Qty: 8 TABLET | Refills: 0 | Status: SHIPPED | OUTPATIENT
Start: 2024-11-15 | End: 2024-11-19

## 2024-11-14 RX ORDER — NICOTINE 21 MG/24HR
1 PATCH, TRANSDERMAL 24 HOURS TRANSDERMAL DAILY
Qty: 28 PATCH | Refills: 0 | Status: SHIPPED | OUTPATIENT
Start: 2024-11-15 | End: 2024-11-19 | Stop reason: ALTCHOICE

## 2024-11-14 RX ORDER — KETOROLAC TROMETHAMINE 30 MG/ML
30 INJECTION, SOLUTION INTRAMUSCULAR; INTRAVENOUS ONCE
Status: COMPLETED | OUTPATIENT
Start: 2024-11-14 | End: 2024-11-14

## 2024-11-14 RX ADMIN — PREDNISONE 40 MG: 20 TABLET ORAL at 12:28

## 2024-11-14 RX ADMIN — KETOROLAC TROMETHAMINE 30 MG: 30 INJECTION, SOLUTION INTRAMUSCULAR; INTRAVENOUS at 12:28

## 2024-11-14 NOTE — ASSESSMENT & PLAN NOTE
Patient was seen by his dentist and had xrays on left jaw due to pain. Now notes pain is also extending to right side  Received augmentin x 5 days but no overt dental infection seen  No facial cellulitis or clinical evidence of parotiditis, etc  Outpatient [facility] referral  Trial  for possible bruxism?  Continue nsaids and 5 day course of prednisone

## 2024-11-14 NOTE — CONSULTS
"Consultation - Cardiology Team 1  Fransisco Ricardo 38 y.o. male MRN: [id number]  Unit/Bed#: -01 Encounter: [id number]    Assessment & Plan  Other chest pain  Associated with palpitations, shortness of breath, diaphoresis.  Last about 20 to 30 seconds.  It occurs randomly.  Not necessarily with exertion. Not pleuritic.   Patient also mentions jaw pain has been fairly constant for a month.  He feels it is unrelated to above symptoms.   Normal troponins  ECG- NSR ST depression inferior/lateral leads. Resolved on repeat ECG. Pt was not having symptoms as mention above at the time but just \"didn't feel right\".   CXR- no acute disease . Lungs clear.  TTE pending  Mild leukocytosis  AS (ankylosing spondylitis) (HCC)  He takes Enbrel weekly.  Tramadol daily.   Abnormal EKG  Sinus rhythm nonspecific ST abnormality inferior leads  Smokes tobacco daily  Smokes  half a pack of cigarettes a day    Recommendations  Will follow-up with current echo. Further recommendations to follow.   Consider 48-hour Holter monitor  Recommend further evaluation of oral complaints.     History of Present Illness   Physician Requesting Consult: Alysa Ghotra MD  Reason for Consult / Principal Problem: chest pain, abnormal EKG    HPI: Fransisco Ricardo is a 38 y.o. year old male who presents with chest pain associate with palpitations shortness of breath and diaphoresis this has been occurring for the past few days.  It happens randomly.  It is not exertional.  It usually subsides slowly. The first episode occurred 6 days ago while driving.  He is a  and recently moving heavier equipment without symptoms.  He also complains of jaw/gum pain.  Initially was the right side now bilateral.  He was placed on an antibiotic by dentist with some improvement but now worse.  He said that pain started about a month ago.  It seems  unrelated to the chest pain.  He however started to have great concern about this ongoing pain.  His mom and is at the " bedside and thinks anxiety is playing a role in the chest pain. He is going through a relationship break up.     Inpatient consult to Cardiology  Consult performed by: MARIO Taylor  Consult ordered by: Faye Moser MD          Review of Systems   Constitutional: Negative.    HENT: Negative.     Eyes: Negative.    Respiratory:  Positive for shortness of breath.    Cardiovascular:  Positive for chest pain and palpitations.   Gastrointestinal: Negative.    Endocrine: Negative.    Genitourinary: Negative.    Musculoskeletal:  Positive for arthralgias.   Skin: Negative.    Neurological:  Positive for light-headedness.   Hematological: Negative.    Psychiatric/Behavioral: Negative.     All other systems reviewed and are negative.      Historical Information   Past Medical History:   Diagnosis Date    Ankylosing spondylitis ([facility])     Arthritis     AS (ankylosing spondylitis) (HCC) 07/21/2017    Encounter for monitoring of etanercept therapy 01/07/2019    Kidney stone      Past Surgical History:   Procedure Laterality Date    [place] ARTHRS HIP DEBRIDEMENT/SHAVING ARTICULAR CRTLG Left 6/1/2017    Procedure: LEFT HIP ARTHROSCOPIC LABRAL DEBRIDEMENT;  Surgeon: Oscar Ferrara MD;  Location: [facility];  Service: Orthopedics    WISDOM TOOTH EXTRACTION       Social History     Substance and Sexual Activity   Alcohol Use No     Social History     Substance and Sexual Activity   Drug Use Not Currently    Types: Marijuana     Social History     Tobacco Use   Smoking Status Every Day    Current packs/day: 0.50    Average packs/day: 0.5 packs/day for 15.0 years (7.5 ttl pk-yrs)    Types: Cigarettes   Smokeless Tobacco Never     Family History:   Family History   Problem Relation Age of Onset    Colon polyps Father     No Known Problems Maternal Grandmother     No Known Problems Maternal Grandfather     No Known Problems Paternal Grandmother     Skin cancer Paternal Grandfather        Meds/Allergies   current meds:   Current  "Facility-Administered Medications:     famotidine (PEPCID) tablet 20 mg, Daily Before Breakfast    meloxicam (MOBIC) tablet 15 mg, Daily PRN    methocarbamol (ROBAXIN) tablet 500 mg, HS PRN    nicotine (NICODERM CQ) 14 mg/24hr TD 24 hr patch 1 patch, Daily    traMADol (ULTRAM) tablet 50 mg, Q6H PRN and PTA meds:    Medications Prior to Admission:     amoxicillin-clavulanate (AUGMENTIN) 875-125 mg per tablet    calcium carbonate (TUMS) 500 mg chewable tablet    etanercept (ENBREL SURECLICK) 50 MG/ML injection    meloxicam (MOBIC) 15 mg tablet    methocarbamol (ROBAXIN) 500 mg tablet    traMADol (ULTRAM) 50 mg tablet    ergocalciferol (VITAMIN D2) 50,000 units    famotidine (PEPCID) 20 mg tablet    NIFEdipine 0.3%-lidocaine 5% rectal ointment  Allergies   Allergen Reactions    Other Other (See Comments)     Raw fruits/veggies.        Objective   Vitals: Blood pressure 120/69, pulse 68, temperature 98.1 °F (36.7 °C), temperature source Oral, resp. rate 18, height 5' 11\" (1.803 m), weight 66.7 kg (147 lb), SpO2 99%.  Orthostatic Blood Pressures      Flowsheet Row Most Recent Value   Blood Pressure 120/69 filed at 11/14/2024 0830   Patient Position - Orthostatic VS Lying filed at 11/14/2024 0734              Intake/Output Summary (Last 24 hours) at 11/14/2024 1052  Last data filed at 11/14/2024 0734  Gross per 24 hour   Intake 960 ml   Output --   Net 960 ml       Invasive Devices       Peripheral Intravenous Line  Duration             Peripheral IV 10/10/19 Right Forearm 1862 days    Peripheral IV 11/13/24 Left Antecubital <1 day                    Physical Exam: /69   Pulse 68   Temp 98.1 °F (36.7 °C) (Oral)   Resp 18   Ht 5' 11\" (1.803 m)   Wt 66.7 kg (147 lb)   SpO2 99%   BMI 20.50 kg/m²   General Appearance:    Alert, cooperative, no distress, appears stated age   Head:    Normocephalic, no scleral icterus   Eyes:    PERRL   Nose:   Nares normal, septum midline, mucosa normal, no drainage    Throat:   " Lips, mucosa, and tongue normal   Neck:   Supple, symmetrical, trachea midline     no JVD       Lungs:     Clear to auscultation bilaterally, respirations unlabored   Chest Wall:    No tenderness or deformity    Heart:    Regular rate and rhythm, S1 and S2 normal, no murmur, rub   or gallop   Abdomen:     Soft, non-tender, bowel sounds active all four quadrants,     no masses, no organomegaly   Extremities:   Extremities normal, atraumatic, no cyanosis or edema       Skin:   Skin color, texture, turgor normal, no rashes or lesions   Neurologic:   Alert and oriented to person place and time. No focal deficits       Lab Results:   Recent Results (from the past 72 hours)   ECG 12 lead    Collection Time: 11/13/24  9:21 AM   Result Value Ref Range    Ventricular Rate 95 BPM    Atrial Rate 95 BPM    MD Interval 118 ms    QRSD Interval 98 ms    QT Interval 338 ms    QTC Interval 424 ms    P Axis 78 degrees    QRS Axis 91 degrees    T Wave Axis 44 degrees   CBC and differential    Collection Time: 11/13/24 10:55 AM   Result Value Ref Range    WBC 11.88 (H) 4.31 - 10.16 Thousand/uL    RBC 5.35 3.88 - 5.62 Million/uL    Hemoglobin 14.6 12.0 - 17.0 g/dL    Hematocrit 45.1 36.5 - 49.3 %    MCV 84 82 - 98 fL    MCH 27.3 26.8 - 34.3 pg    MCHC 32.4 31.4 - 37.4 g/dL    RDW 12.5 11.6 - 15.1 %    MPV 8.3 (L) 8.9 - 12.7 fL    Platelets 346 149 - 390 Thousands/uL    nRBC 0 /100 WBCs    Segmented % 84 (H) 43 - 75 %    Immature Grans % 0 0 - 2 %    Lymphocytes % 8 (L) 14 - 44 %    Monocytes % 7 4 - 12 %    Eosinophils Relative 1 0 - 6 %    Basophils Relative 0 0 - 1 %    Absolute Neutrophils 9.94 (H) 1.85 - 7.62 Thousands/µL    Absolute Immature Grans 0.04 0.00 - 0.20 Thousand/uL    Absolute Lymphocytes 0.94 0.60 - 4.47 Thousands/µL    Absolute Monocytes 0.86 0.17 - 1.22 Thousand/µL    Eosinophils Absolute 0.07 0.00 - 0.61 Thousand/µL    Basophils Absolute 0.03 0.00 - 0.10 Thousands/µL   Basic metabolic panel    Collection Time:  "11/13/24 10:55 AM   Result Value Ref Range    Sodium 139 135 - 147 mmol/L    Potassium 4.0 3.5 - 5.3 mmol/L    Chloride 103 96 - 108 mmol/L    CO2 30 21 - 32 mmol/L    ANION GAP 6 4 - 13 mmol/L    BUN 16 5 - 25 mg/dL    Creatinine 0.99 0.60 - 1.30 mg/dL    Glucose 89 65 - 140 mg/dL    Calcium 9.0 8.4 - 10.2 mg/dL    eGFR 97 ml/min/1.73sq m   D-Dimer    Collection Time: 11/13/24 10:55 AM   Result Value Ref Range    D-Dimer, Quant <0.27 <0.50 ug/ml FEU   HS Troponin 0hr (reflex protocol)    Collection Time: 11/13/24 10:55 AM   Result Value Ref Range    hs TnI 0hr 3 \"Refer to ACS Flowchart\"- see link ng/L   Hepatic function panel    Collection Time: 11/13/24 10:55 AM   Result Value Ref Range    Total Bilirubin 0.41 0.20 - 1.00 mg/dL    Bilirubin, Direct 0.10 0.00 - 0.20 mg/dL    Alkaline Phosphatase 84 34 - 104 U/L    AST 10 (L) 13 - 39 U/L    ALT 12 7 - 52 U/L    Total Protein 6.7 6.4 - 8.4 g/dL    Albumin 3.9 3.5 - 5.0 g/dL   ECG 12 lead    Collection Time: 11/13/24 11:32 AM   Result Value Ref Range    Ventricular Rate 69 BPM    Atrial Rate 69 BPM    VT Interval 124 ms    QRSD Interval 100 ms    QT Interval 394 ms    QTC Interval 422 ms    P [place] 77 degrees    QRS Axis 92 degrees    T Wave [place] 75 degrees   HS Troponin I 2hr    Collection Time: 11/13/24  1:02 PM   Result Value Ref Range    hs TnI 2hr 3 \"Refer to ACS Flowchart\"- see link ng/L    Delta 2hr hsTnI 0 <20 ng/L   HS Troponin I 4hr    Collection Time: 11/13/24  3:14 PM   Result Value Ref Range    hs TnI 4hr 3 \"Refer to ACS Flowchart\"- see link ng/L    Delta 4hr hsTnI 0 <20 ng/L   C-reactive protein    Collection Time: 11/13/24  3:14 PM   Result Value Ref Range    CRP 13.1 (H) <3.0 mg/L   Sedimentation rate, automated    Collection Time: 11/13/24  8:00 PM   Result Value Ref Range    Sed Rate 24 (H) 0 - 14 mm/hour   Echo complete w/ contrast if indicated    Collection Time: 11/14/24  8:46 AM   Result Value Ref Range    BSA 1.85 m2    A4C EF 57 %    LVIDd 4.70 " cm    LVIDS 2.90 cm    IVSd 0.80 cm    LVPWd 0.70 cm    FS 38 28 - 44    MV E' Tissue Velocity Septal 15 cm/s    LA Volume Index (BP) 12.4 mL/m2    E/A ratio 1.76     E wave deceleration time 198 ms    MV Peak E Yash 67 cm/s    MV Peak A Yash 0.38 m/s    RVID d 4.2 cm    Tricuspid annular plane systolic excursion 2.00 cm    LA size 2.8 cm    LA length (A2C) 4.50 cm    PREETHI A4C 10.2 cm2    LA volume (BP) 23 mL    RA 2D Volume 25.0 mL    RAA A4C 10.9 cm2    MV stenosis pressure 1/2 time 57 ms    MV valve area p 1/2 method 3.86     TR Peak Yash 1.4 m/s    Triscuspid Valve Regurgitation Peak Gradient 8.0 mmHg    Ao root 3.40 cm    Asc Ao 2.7 cm    Tricuspid valve peak regurgitation velocity 1.41 m/s    Left ventricular stroke volume (2D) 71.00 mL    IVS 0.8 cm    LEFT VENTRICLE SYSTOLIC VOLUME (MOD BIPLANE) 2D 33 mL    LV DIASTOLIC VOLUME (MOD BIPLANE) 2D 104 mL    Left Atrium Area-systolic Four Chamber 10.4 cm2    Left Atrium Area-systolic Apical Two Chamber 12.2 cm2    LVSV, 2D 71 mL    LV EF 55     Est. RA pres 3.0 mmHg    Right Ventricular Peak Systolic Pressure 11.00 mmHg     Imaging: I have personally reviewed pertinent reports.    EKG: NSR inferior lateral st depression      Code Status: Level 1 - Full Code  Advance Directive and Living Will:      Power of :    POLST:      Counseling / Coordination of Care  Total floor / unit time spent today 60 minutes.  Greater than 50% of total time was spent with the patient and / or family counseling and / or coordination of care.

## 2024-11-14 NOTE — DISCHARGE INSTR - AVS FIRST PAGE
Dear Fransisco Ricardo,     It was our pleasure to care for you here at [facility].  It is our hope that we were always able to exceed the expected standards for your care during your stay.  You were hospitalized due to jaw and chest pain.  You were cared for on the 1st floor by Angelia Carrasquillo PA-C under the service of Alysa Ghotra MD with the [facility] who covers for your primary care physician (PCP), Yanira Ibrahim DO, while you were hospitalized.  If you have any questions or concerns related to this hospitalization, you may contact us at .  For follow up as well as any medication refills, we recommend that you follow up with your primary care physician.  A registered nurse will reach out to you by phone within a few days after your discharge to answer any additional questions that you may have after going home.  However, at this time we provide for you here, the most important instructions / recommendations at discharge:     Notable Medication Adjustments -   Continue mobic and tramadol for pain  Prednisone 40 mg daily x 5 days total for inflammation  Testing Required after Discharge -   Zio patch and stress test per cardiology  ** Please contact your PCP to request testing orders for any of the testing recommended here **  Important follow up information -   Oral maxillary surgery  PCP  Cardiology  rheumatology  Other Instructions -     Please review this entire after visit summary as additional general instructions including medication list, appointments, activity, diet, any pertinent wound care, and other additional recommendations from your care team that may be provided for you.      Sincerely,     Angelia Carrasquillo PA-C

## 2024-11-14 NOTE — DISCHARGE SUMMARY
Discharge Summary - Hospitalist   Name: Fransisco Ricardo 38 y.o. male I MRN: [id number]  Unit/Bed#: -01 I Date of Admission: 11/13/2024   Date of Service: 11/14/2024 I Hospital Day: 0     Assessment & Plan  Other chest pain  Patient presented with 5 days of intermittent chest pain/heaviness and feeling unwell  He is unable to quantify the pain but reports it is substernal; not TTP to suggest costochondritis  Able to ambulate without symptoms  Trops negative x 3. D-dimer negative. ESR/CRP no significant elevation  Cardiology consult placed in ED  2d echo unremarkable  No GI or pulmonary complaints. No fever,chills, hypoxia   Abnormal EKG  Initial EKG in ED showing ST depressions, Case discussed with on-call cardiology who recommended observation  Repeat EKG normal  Outpatient zio and stress test  AS (ankylosing spondylitis) (HCC)  Takes Enbrel weekly, skipped this past Sunday dose because of concerns over dental infection  Resume outpatient this weekend  Continue home pain medication with tramadol, meloxicam  Smokes tobacco daily  Nicotine patch ordered  Jaw pain  Patient was seen by his dentist and had xrays on left jaw due to pain. Now notes pain is also extending to right side  Received augmentin x 5 days but no overt dental infection seen  No facial cellulitis or clinical evidence of parotiditis, etc  Outpatient OMFS referral  Trial  for possible bruxism?  Continue nsaids and 5 day course of prednisone     Medical Problems       Resolved Problems  Date Reviewed: 11/14/2024   None       Discharging Physician / Practitioner: Angelia Carrasquillo PA-C  PCP: Yanira Ibrahim DO  Admission Date:   Admission Orders (From admission, onward)       Ordered        11/13/24 1422  Place in Observation  Once                          Discharge Date: 11/14/24    Consultations During Hospital Stay:  cardiology    Procedures Performed:   2d echo    Significant Findings / Test Results:   As above    Incidental Findings:     none    Test Results Pending at Discharge (will require follow up):   none     Outpatient Tests Requested:  ZIO  Stress test    Complications:  none    Reason for Admission: chest pain    Hospital Course:   Fransisco Ricardo is a 38 y.o. male patient with history of ankylosing spondylitis who originally presented to the hospital on 11/13/2024 due to intermittent chest pain ongoing for 5 days. He described spells of feeling very hot, flushed, heart racing. Patient last week was noting right jaw pain and was concerned he was getting a dental infection so he started holding his usual Enbrel. He saw his dentist who noted a normal exam and xrays. On the same day he had a routine appt with his rheumatologist. Symptoms persisted so he went to urgent care and was Rx'd augmentin. He continued to have both right and then also left sided jaw pain but of more concern, was having chest heaviness. In the ED his chest xray, troponins and d dimer were normal. 2d echo was also done and was normal. He was seen by cardiology who suggested outpatient zio patch and outaptient stress test but no additional inpatient interventions or meds were deemed necessary. He was tried on a dose of toradol IV and prednisone with some improvement but not resolution of his discomfort.  At discharge in addition to following up with cardiology we recommend that he continues to follow-up with his rheumatologist and have also suggested that he see oral maxillary facial surgery.      Please see above list of diagnoses and related plan for additional information.     Condition at Discharge: stable    Discharge Day Visit / Exam:   Subjective:  Upon my first eval in the morning patient noted he was still having a lot of jaw pain, not tender to touch, no headache/temple pain or vision changes. He was concerned due to this chest heaviness he has been feeling as well. No tenderness along sternum  Vitals: Blood Pressure: 120/69 (11/14/24 0830)  Pulse: 68 (11/14/24  "0830)  Temperature: 98.1 °F (36.7 °C) (11/14/24 0734)  Temp Source: Oral (11/14/24 0734)  Respirations: 18 (11/14/24 0734)  Height: 5' 11\" (180.3 cm) (11/14/24 0830)  Weight - Scale: 66.7 kg (147 lb) (11/14/24 0830)  SpO2: 99 % (11/14/24 0734)  Physical Exam  Vitals reviewed.   Constitutional:       General: He is not in acute distress.     Appearance: Normal appearance. He is normal weight. He is not ill-appearing, toxic-appearing or diaphoretic.   HENT:      Head: Normocephalic and atraumatic.      Nose: No congestion or rhinorrhea.      Mouth/Throat:      Mouth: Mucous membranes are dry.      Pharynx: Oropharynx is clear. No oropharyngeal exudate or posterior oropharyngeal erythema.      Comments: No facial swelling or erythema. Normal tongue, normal appearing dentition.  Eyes:      General: No scleral icterus.        Right eye: No discharge.         Left eye: No discharge.      Conjunctiva/sclera: Conjunctivae normal.   Cardiovascular:      Rate and Rhythm: Normal rate and regular rhythm.      Heart sounds: No murmur heard.  Pulmonary:      Effort: Pulmonary effort is normal. No respiratory distress.      Breath sounds: Normal breath sounds. No stridor. No wheezing, rhonchi or rales.   Chest:      Chest wall: No tenderness.   Abdominal:      General: There is no distension.      Tenderness: There is no abdominal tenderness. There is no guarding.   Musculoskeletal:      Cervical back: Neck supple. No rigidity or tenderness.      Right lower leg: No edema.      Left lower leg: No edema.   Lymphadenopathy:      Cervical: No cervical adenopathy.   Skin:     General: Skin is warm and dry.      Coloration: Skin is not jaundiced or pale.      Findings: No bruising, erythema, lesion or rash.   Neurological:      General: No focal deficit present.      Mental Status: He is alert. Mental status is at baseline.      Comments: Awake alert interactive no confusion noted   Psychiatric:         Mood and Affect: Mood normal.    "      Thought Content: Thought content normal.        Discussion with Family: Updated  (family) at bedside.    Discharge instructions/Information to patient and family:   See after visit summary for information provided to patient and family.      Provisions for Follow-Up Care:  See after visit summary for information related to follow-up care and any pertinent home health orders.      Mobility at time of Discharge:   Basic Mobility Inpatient Raw Score: 24  JH-HLM Goal: 8: Walk 250 feet or more  JH-HLM Achieved: 7: Walk 25 feet or more  HLM Goal achieved. Continue to encourage appropriate mobility. Observed patient ambulating on unit      Disposition:   Home    Planned Readmission: none    Discharge Medications:  See after visit summary for reconciled discharge medications provided to patient and/or family.      Administrative Statements   Discharge Statement:  I have spent a total time of 50 minutes in caring for this patient on the day of the visit/encounter. Case d/w my attending and cardiology  .    **Please Note: This note may have been constructed using a voice recognition system**

## 2024-11-14 NOTE — UTILIZATION REVIEW
Initial Clinical Review    Admission: Date/Time/Statement:   Admission Orders (From admission, onward)       Ordered        11/13/24 1422  Place in Observation  Once                          Orders Placed This Encounter   Procedures    Place in Observation     Standing Status:   Standing     Number of Occurrences:   1     Level of Care:   Med Surg [16]     ED Arrival Information       Expected   -    Arrival   11/13/2024 09:09    Acuity   Urgent              Means of arrival   Walk-In    Escorted by   Self    Service   Hospitalist    Admission type   Emergency              Arrival complaint   Palpitations/SOB/Jaw Pain/Nausea             Chief Complaint   Patient presents with    Chest Pain     Since Friday morning pt has intermittent CP, SOB, palpitations. Went to urgent care and given penicillin for mouth infection. +jaw pain.        Initial Presentation: 38 y.o. male presents to ED  from home with chest pain, gum and jaw pain  for past  few days.  [facility] and given po antibiotics for presumed  dental infection.  Saw  a  dentist  and  determined  not a  dental issue.  Follows with rheumatology, saw them and  felt  no issue. Chest pain  recurred with SOB the day of admission, substernal, non radiating, tachycardia, palpitations, sweats and chills, lasted minutes.    +   smoker   Troponin negative.   EKG  shows  STD.  Admit  Observation with  Other  chest pain, Abnormal EKG and plan is  cardiology consult, 2 DE   and continue  home meds.      Anticipated Length of Stay/Certification Statement:  Patient will be admitted on an observation basis with an anticipated length of stay of less than 2 midnights secondary to chest pain.       ED Treatment-Medication Administration from 11/13/2024 0909 to 11/13/2024 1546         Date/Time Order Dose Route Action     11/13/2024 1442 aspirin tablet 325 mg 325 mg Oral Given            Scheduled Medications:  famotidine, 20 mg, Oral, Daily Before Breakfast  nicotine, 1  patch, Transdermal, Daily      Continuous IV Infusions:     PRN Meds:  meloxicam, 15 mg, Oral, Daily PRN  methocarbamol, 500 mg, Oral, HS PRN  traMADol, 50 mg, Oral, Q6H PRN      ED Triage Vitals [11/13/24 0918]   Temperature Pulse Respirations Blood Pressure SpO2 Pain Score   97.8 °F (36.6 °C) (!) 114 16 135/84 100 % 1     Weight (last 2 days)       Date/Time Weight    11/14/24 0830 66.7 (147)    11/13/24 2010 67 (147.71)    11/13/24 0918 67 (147.71)            Vital Signs (last 3 days)       Date/Time Temp Pulse Resp BP MAP (mmHg) SpO2 O2 Device Patient Position - Orthostatic VS [place] Coma Scale Score Pain    11/14/24 0830 -- 68 -- 120/69 -- -- -- -- -- --    11/14/24 0734 98.1 °F (36.7 °C) 68 18 120/69 86 99 % None (Room air) Lying -- --    11/13/24 2318 98.2 °F (36.8 °C) 72 18 141/78 101 99 % None (Room air) Lying -- --    11/13/24 2100 -- -- -- -- -- -- -- -- 15 --    11/13/24 2039 -- -- -- -- -- -- -- -- -- 4    11/13/24 2010 98.2 °F (36.8 °C) 71 18 120/66 -- -- -- -- -- --    11/13/24 2004 -- -- -- -- -- -- -- -- -- No Pain    11/13/24 1758 -- -- -- -- -- -- -- -- 15 --    11/13/24 1549 98.2 °F (36.8 °C) 71 18 120/66 -- 99 % None (Room air) Sitting -- --    11/13/24 1430 -- 72 16 125/72 94 99 % -- -- -- --    11/13/24 1330 -- 61 16 109/68 83 100 % -- -- -- --    11/13/24 1130 -- 60 16 117/65 87 99 % -- -- -- --    11/13/24 0930 -- -- -- -- -- -- -- -- 15 --    11/13/24 0918 97.8 °F (36.6 °C) 114 16 135/84 105 100 % None (Room air) Sitting -- 1              Pertinent Labs/Diagnostic Test Results:   Radiology:  XR chest 2 views   Final Interpretation by Mikel Cooper MD (11/13 1315)      No acute cardiopulmonary disease.            Resident: REJI Garcia I, the attending radiologist, have reviewed the images and agree with the final report above.      Workstation performed: [id number]           Cardiology:  ECG 12 lead   Final Result by Yocasta Elliott DO (11/13 1925)   Normal sinus rhythm with sinus  arrhythmia   Rightward axis   Borderline ECG   When compared with ECG of 13-Nov-2024 09:21, (unconfirmed)   T wave inversion no longer evident in Inferior leads   Confirmed by Yocasta Elliott ([id number]) on 11/13/2024 7:25:40 PM      ECG 12 lead   Final Result by Yocasta Elliott DO (11/13 1925)   Normal sinus rhythm   Rightward axis   Nonspecific ST abnormality   Abnormal ECG   No previous ECGs available   Confirmed by Yocasta Elliott ([id number]) on 11/13/2024 7:25:27 PM            Results from last 7 days   Lab Units 11/13/24  1055   WBC Thousand/uL 11.88*   HEMOGLOBIN g/dL 14.6   HEMATOCRIT % 45.1   PLATELETS Thousands/uL 346   TOTAL NEUT ABS Thousands/µL 9.94*         Results from last 7 days   Lab Units 11/13/24  1055   SODIUM mmol/L 139   POTASSIUM mmol/L 4.0   CHLORIDE mmol/L 103   CO2 mmol/L 30   ANION GAP mmol/L 6   BUN mg/dL 16   CREATININE mg/dL 0.99   EGFR ml/min/1.73sq m 97   CALCIUM mg/dL 9.0     Results from last 7 days   Lab Units 11/13/24  1055   AST U/L 10*   ALT U/L 12   ALK PHOS U/L 84   TOTAL PROTEIN g/dL 6.7   ALBUMIN g/dL 3.9   TOTAL BILIRUBIN mg/dL 0.41   BILIRUBIN DIRECT mg/dL 0.10         Results from last 7 days   Lab Units 11/13/24  1055   GLUCOSE RANDOM mg/dL 89           Results from last 7 days   Lab Units 11/13/24  1514 11/13/24  1302 11/13/24  1055   HS TNI 0HR ng/L  --   --  3   HS TNI 2HR ng/L  --  3  --    HSTNI D2 ng/L  --  0  --    HS TNI 4HR ng/L 3  --   --    HSTNI D4 ng/L 0  --   --      Results from last 7 days   Lab Units 11/13/24  1055   D-DIMER QUANTITATIVE ug/ml FEU <0.27               Results from last 7 days   Lab Units 11/13/24 2000 11/13/24  1514   CRP mg/L  --  13.1*   SED RATE mm/hour 24*  --            Present on Admission:   AS (ankylosing spondylitis) ([facility])      Admitting Diagnosis: Chest pain [R07.9]  Abnormal ECG [R94.31]  Age/Sex: 38 y.o. male    Network Utilization Review Department  ATTENTION: Please call with any questions or concerns to [phone] and carefully listen to  the prompts so that you are directed to the right person. All voicemails are confidential.   For Discharge needs, contact Care Management DC Support Team at [phone] opt. 2  Send all requests for admission clinical reviews, approved or denied determinations and any other requests to dedicated fax number below belonging to the campus where the patient is receiving treatment. List of dedicated fax numbers for the Facilities:  FACILITY NAME UR FAX NUMBER   ADMISSION DENIALS (Administrative/Medical Necessity) [phone]   DISCHARGE SUPPORT TEAM (NETWORK) [phone]   PARENT CHILD HEALTH (Maternity/NICU/Pediatrics) [phone]   General acute hospital [phone]   [facility] [phone]   [facility] [phone]   [facility] [phone]   [facility] [phone]   [facility] [phone]   [facility] [phone]   [facility] [phone]   [facility] [phone]   [facility] [phone]   [facility] [phone]   [facility] [phone]

## 2024-11-14 NOTE — ASSESSMENT & PLAN NOTE
Patient presented with 5 days of intermittent chest pain/heaviness and feeling unwell  He is unable to quantify the pain but reports it is substernal; not TTP to suggest costochondritis  Able to ambulate without symptoms  Trops negative x 3. D-dimer negative. ESR/CRP no significant elevation  Cardiology consult placed in ED  2d echo unremarkable  No GI or pulmonary complaints. No fever,chills, hypoxia

## 2024-11-14 NOTE — ASSESSMENT & PLAN NOTE
"Associated with palpitations, shortness of breath, diaphoresis.  Last about 20 to 30 seconds.  It occurs randomly.  Not necessarily with exertion. Not pleuritic.   Patient also mentions jaw pain has been fairly constant for a month.  He feels it is unrelated to above symptoms.   Normal troponins  ECG- NSR ST depression inferior/lateral leads. Resolved on repeat ECG. Pt was not having symptoms as mention above at the time but just \"didn't feel right\".   CXR- no acute disease . Lungs clear.  TTE pending  Mild leukocytosis  "

## 2024-11-14 NOTE — ASSESSMENT & PLAN NOTE
Initial EKG in ED showing ST depressions, Case discussed with on-call cardiology who recommended observation  Repeat EKG normal  Outpatient zio and stress test

## 2024-11-15 ENCOUNTER — TRANSITIONAL CARE MANAGEMENT (OUTPATIENT)
Dept: FAMILY MEDICINE CLINIC | Facility: CLINIC | Age: 39
End: 2024-11-15

## 2024-11-18 ENCOUNTER — TELEPHONE (OUTPATIENT)
Age: 39
End: 2024-11-18

## 2024-11-18 NOTE — TELEPHONE ENCOUNTER
Pt calls in and states that He stopped taking his Enbrel because there was concern that he had an infection in his tooth. He states that he confirmed with Dental surgeon that there is no infection and wanted to know if he can continue his Enbrel . Please advise

## 2024-11-18 NOTE — TELEPHONE ENCOUNTER
Made pt aware that he can start Enbrel if there is no infection per Dr. Emerson. Pt states so far the tests have been negative and he will start it up

## 2024-11-19 ENCOUNTER — RA CDI HCC (OUTPATIENT)
Dept: OTHER | Facility: HOSPITAL | Age: 39
End: 2024-11-19

## 2024-11-19 ENCOUNTER — APPOINTMENT (OUTPATIENT)
Dept: LAB | Facility: CLINIC | Age: 39
End: 2024-11-19
Payer: COMMERCIAL

## 2024-11-19 ENCOUNTER — NURSE TRIAGE (OUTPATIENT)
Age: 39
End: 2024-11-19

## 2024-11-19 ENCOUNTER — OFFICE VISIT (OUTPATIENT)
Dept: FAMILY MEDICINE CLINIC | Facility: CLINIC | Age: 39
End: 2024-11-19
Payer: COMMERCIAL

## 2024-11-19 ENCOUNTER — RESULTS FOLLOW-UP (OUTPATIENT)
Dept: FAMILY MEDICINE CLINIC | Facility: CLINIC | Age: 39
End: 2024-11-19

## 2024-11-19 VITALS
DIASTOLIC BLOOD PRESSURE: 70 MMHG | OXYGEN SATURATION: 99 % | RESPIRATION RATE: 16 BRPM | WEIGHT: 145 LBS | BODY MASS INDEX: 19.64 KG/M2 | HEIGHT: 72 IN | HEART RATE: 78 BPM | TEMPERATURE: 96.3 F | SYSTOLIC BLOOD PRESSURE: 120 MMHG

## 2024-11-19 DIAGNOSIS — E53.8 B12 DEFICIENCY: ICD-10-CM

## 2024-11-19 DIAGNOSIS — R00.2 PALPITATIONS: ICD-10-CM

## 2024-11-19 DIAGNOSIS — G89.29 CHRONIC BILATERAL LOW BACK PAIN WITHOUT SCIATICA: ICD-10-CM

## 2024-11-19 DIAGNOSIS — F41.1 ANXIETY STATE: ICD-10-CM

## 2024-11-19 DIAGNOSIS — M54.50 CHRONIC BILATERAL LOW BACK PAIN WITHOUT SCIATICA: ICD-10-CM

## 2024-11-19 DIAGNOSIS — M26.622 ARTHRALGIA OF LEFT TEMPOROMANDIBULAR JOINT: ICD-10-CM

## 2024-11-19 DIAGNOSIS — M45.0 ANKYLOSING SPONDYLITIS OF MULTIPLE SITES IN SPINE (HCC): ICD-10-CM

## 2024-11-19 DIAGNOSIS — R79.89 ABNORMALITY OF THYROID HORMONE: Primary | ICD-10-CM

## 2024-11-19 DIAGNOSIS — E55.9 VITAMIN D DEFICIENCY: ICD-10-CM

## 2024-11-19 DIAGNOSIS — R07.9 CHEST PAIN, UNSPECIFIED TYPE: Primary | ICD-10-CM

## 2024-11-19 PROBLEM — F32.2 SEVERE MAJOR DEPRESSIVE DISORDER (HCC): Status: ACTIVE | Noted: 2024-11-19

## 2024-11-19 LAB
T4 FREE SERPL-MCNC: 1.16 NG/DL (ref 0.61–1.12)
TSH SERPL DL<=0.05 MIU/L-ACNC: 1.52 UIU/ML (ref 0.45–4.5)

## 2024-11-19 PROCEDURE — 99496 TRANSJ CARE MGMT HIGH F2F 7D: CPT | Performed by: FAMILY MEDICINE

## 2024-11-19 PROCEDURE — 84443 ASSAY THYROID STIM HORMONE: CPT

## 2024-11-19 PROCEDURE — 84439 ASSAY OF FREE THYROXINE: CPT

## 2024-11-19 PROCEDURE — 36415 COLL VENOUS BLD VENIPUNCTURE: CPT

## 2024-11-19 RX ORDER — METHOCARBAMOL 500 MG/1
500 TABLET, FILM COATED ORAL
Qty: 30 TABLET | Refills: 5 | Status: SHIPPED | OUTPATIENT
Start: 2024-11-19

## 2024-11-19 RX ORDER — PANTOPRAZOLE SODIUM 40 MG/1
40 TABLET, DELAYED RELEASE ORAL
Qty: 30 TABLET | Refills: 5 | Status: SHIPPED | OUTPATIENT
Start: 2024-11-19 | End: 2025-05-18

## 2024-11-19 RX ORDER — LORAZEPAM 0.5 MG/1
0.5 TABLET ORAL EVERY 8 HOURS PRN
Qty: 6 TABLET | Refills: 0 | Status: SHIPPED | OUTPATIENT
Start: 2024-11-19

## 2024-11-19 NOTE — TELEPHONE ENCOUNTER
"Scheduled for this morning  Patient called. Still with symptoms after discharge from the hospital. C/o intermittent flushing, rapid heart rate, dry mouth, weakness. Is scheduled for virtual visit 11/26 for TCM but asking for an appt and further testing like thyroid. Is not taking prednisone anymore and these symptoms were present before taking the steroid . Did not have his current heart rate , at work. Episode this morning lasted 15 minutes. Has resolved . States is happens every morning.  Having stress test Friday .  Reason for Disposition   Heart beating very rapidly (e.g., > 140 / minute) and not present now  (Exception: During exercise.)    Answer Assessment - Initial Assessment Questions  1. DESCRIPTION: \"Please describe your heart rate or heartbeat that you are having\" (e.g., fast/slow, regular/irregular, skipped or extra beats, \"palpitations\")      Fast, doesn't know how to take it   2. ONSET: \"When did it start?\" (e.g., minutes, hours, days)       2 weeks   3. DURATION: \"How long does it last\" (e.g., seconds, minutes, hours)      A couple minutes   4. PATTERN \"Does it come and go, or has it been constant since it started?\"  \"Does it get worse with exertion?\"   \"Are you feeling it now?\"      Happens in the morning   5. TAP: \"Using your hand, can you tap out what you are feeling on a chair or table in front of you, so that I can hear?\" Note: Not all patients can do this.        N/a  6. HEART RATE: \"Can you tell me your heart rate?\" \"How many beats in 15 seconds?\"  Note: Not all patients can do this.        Unable to check it   7. RECURRENT SYMPTOM: \"Have you ever had this before?\" If Yes, ask: \"When was the last time?\" and \"What happened that time?\"       Denies   8. CAUSE: \"What do you think is causing the palpitations?\"      Unknown . Hospital ruled out everything. Having stress test Friday   9. CARDIAC HISTORY: \"Do you have any history of heart disease?\" (e.g., heart attack, angina, bypass surgery, " "angioplasty, arrhythmia)       Hospitalization last week    10. OTHER SYMPTOMS: \"Do you have any other symptoms?\" (e.g., dizziness, chest pain, sweating, difficulty breathing)        Flushing, feeling , dry mouth , weak , heart racing   11. PREGNANCY: \"Is there any chance you are pregnant?\" \"When was your last menstrual period?\"        N/a    Protocols used: Heart Rate and Heartbeat Questions-Adult-[place]    "

## 2024-11-19 NOTE — PATIENT INSTRUCTIONS
Stop famotidine  Start protonix daily  If you feel acutely anxious, try an ativan but watch for sedation.  If all sx subside with ativan, it's anxiety  Stress this week and holter this week  Thyroid labs

## 2024-11-19 NOTE — TELEPHONE ENCOUNTER
Reason for call:   [x] Refill   [] Prior Auth  [] Other:     Office:   [] PCP/Provider -   [x] Specialty/Provider - [facility] / Micah Harry MD     Medication:   ~ methocarbamol (ROBAXIN) 500 mg tablet - Take 1 tablet (500 mg total) by mouth daily at bedtime as needed for muscle spasms     Pharmacy:   [id number] [place] [facility] [address]     Does the patient have enough for 3 days?   [] Yes   [x] No - Send as HP to POD

## 2024-11-19 NOTE — PROGRESS NOTES
Name: Fransisco Ricardo      : 1985      MRN: [id number]  Encounter Provider: Yanira Ibrahim DO  Encounter Date: 2024   Encounter department: [facility]  :  Assessment & Plan  Chest pain, unspecified type  Stress test Friday  EKG shows NSR with Sinus arrhythmia.  Nml axis.  Nml EKG  If sx worsen, ER  ? Role of anxiety and/or GERD  Start protonix daily    Orders:    pantoprazole (PROTONIX) 40 mg tablet; Take 1 tablet (40 mg total) by mouth daily before breakfast    LORazepam (Ativan) 0.5 mg tablet; Take 1 tablet (0.5 mg total) by mouth every 8 (eight) hours as needed for anxiety    Palpitations  Check TFT's  ?anxiety       Anxiety state  Is anxious--unclear of role this is playing  Gave #6 ativan--he can try this.  If all sx go away, anxiety  Discussed can't use ativan for long-term and we can think about another med if the anxiety is the        Arthralgia of left temporomandibular joint  Clear TMJ on exam  Asa prn              History of Present Illness     HPI  TCM Call       Date and time call was made  11/15/2024  3:36 PM    Hospital care reviewed  Records reviewed    Patient was hospitialized at  [facility]    Date of Admission  24    Date of discharge  24    Diagnosis  chest pain    Disposition  Home    Were the patients medications reviewed and updated  Yes    Current Symptoms  None          TCM Call       Post hospital issues  None    Should patient be enrolled in anticoag monitoring?  No    Patients specialists  Cardiologist    Did you obtain your prescribed medications  Yes    Do you need help managing your prescriptions or medications  No    Is transportation to your appointment needed  No          Pt was hospitalized.  Had jaw pain--had been seen at urgent care and by dentist.  No issue, but he also had chest pain, shortness of breath, palps and went to ED.    Pt was admitted for obs for chest pain and work-up.  Saw cards in ER.  ruby Thapa,  inflammatory markers negative.  Was d/c'd with instructions for stress this week and ziopatch outpt.    Since discharge, pt notes jaw pain intermittently and sense of chest discomfort which is  present intermittently and can feel like burning.  Nausea in AM.  Isn't eating much.  Feels overall weak.  Can't focus.  +fatigue.  Notices Palps--heart racing at times.  Feels extremely nervous.  Palms sweaty     Review of Systems   Constitutional:  Positive for fatigue. Negative for chills, fever and unexpected weight change.   HENT:  Negative for congestion, ear pain, hearing loss, postnasal drip, rhinorrhea, sinus pressure, sinus pain, sore throat, trouble swallowing and voice change.    Eyes:  Negative for pain, redness and visual disturbance.   Respiratory:  Negative for cough and shortness of breath.    Cardiovascular:  Positive for chest pain and palpitations. Negative for leg swelling.   Gastrointestinal:  Negative for abdominal pain, constipation, diarrhea and nausea.   Endocrine: Negative for cold intolerance, heat intolerance, polydipsia and polyuria.   Genitourinary:  Negative for dysuria, frequency and urgency.   Musculoskeletal:  Negative for arthralgias, joint swelling and myalgias.   Skin:  Negative for rash.        No suspicious lesions   Neurological:  Positive for light-headedness. Negative for weakness, numbness and headaches.   Hematological:  Negative for adenopathy.   Psychiatric/Behavioral:  The patient is nervous/anxious.           Objective   /70 (BP Location: Left arm, Patient Position: Sitting, Cuff Size: Standard)   Pulse 78   Temp (!) 96.3 °F (35.7 °C) (Tympanic)   Resp 16   Ht 6' (1.829 m)   Wt 65.8 kg (145 lb)   SpO2 99%   BMI 19.67 kg/m²      Physical Exam  Constitutional:       Appearance: Normal appearance.   HENT:      Head: Normocephalic and atraumatic.      Comments: +TMJ pain to palpation on L jaw     Right Ear: Tympanic membrane, ear canal and external ear normal.       Left Ear: Tympanic membrane, ear canal and external ear normal.      Nose: Nose normal. No congestion.      Mouth/Throat:      Mouth: Mucous membranes are moist.      Pharynx: No oropharyngeal exudate or posterior oropharyngeal erythema.   Eyes:      Extraocular Movements: Extraocular movements intact.      Conjunctiva/sclera: Conjunctivae normal.      Pupils: Pupils are equal, round, and reactive to light.   Neck:      Vascular: No carotid bruit.   Cardiovascular:      Rate and Rhythm: Normal rate and regular rhythm.      Heart sounds: No murmur heard.     No friction rub. No gallop.   Pulmonary:      Effort: Pulmonary effort is normal.      Breath sounds: No wheezing, rhonchi or rales.   Abdominal:      General: Abdomen is flat. There is no distension.      Palpations: Abdomen is soft.      Tenderness: There is no abdominal tenderness.   Musculoskeletal:      Cervical back: Neck supple.   Lymphadenopathy:      Cervical: No cervical adenopathy.   Neurological:      General: No focal deficit present.      Mental Status: He is alert and oriented to person, place, and time.      Cranial Nerves: No cranial nerve deficit.      Motor: No weakness.      Deep Tendon Reflexes: Reflexes normal.

## 2024-11-22 ENCOUNTER — HOSPITAL ENCOUNTER (OUTPATIENT)
Dept: NON INVASIVE DIAGNOSTICS | Facility: HOSPITAL | Age: 39
Discharge: HOME | End: 2024-11-22
Payer: COMMERCIAL

## 2024-11-22 ENCOUNTER — RESULTS FOLLOW-UP (OUTPATIENT)
Dept: FAMILY MEDICINE CLINIC | Facility: CLINIC | Age: 39
End: 2024-11-22

## 2024-11-22 VITALS
SYSTOLIC BLOOD PRESSURE: 110 MMHG | DIASTOLIC BLOOD PRESSURE: 70 MMHG | RESPIRATION RATE: 16 BRPM | HEART RATE: 85 BPM | OXYGEN SATURATION: 99 %

## 2024-11-22 DIAGNOSIS — R07.9 CHEST PAIN: ICD-10-CM

## 2024-11-22 LAB
MAX HR PERCENT: 98 %
MAX HR: 166 BPM
RATE PRESSURE PRODUCT: NORMAL
SL CV STRESS RECOVERY BP: NORMAL MMHG
SL CV STRESS RECOVERY HR: 99 BPM
SL CV STRESS RECOVERY O2 SAT: 98 %
STRESS ANGINA INDEX: 1
STRESS BASELINE BP: 110 MMHG
STRESS BASELINE HR: 85 BPM
STRESS DUKE TREADMILL SCORE: 1
STRESS O2 SAT REST: 99 %
STRESS PEAK HR: 164 BPM
STRESS POST ESTIMATED WORKLOAD: 11.7 METS
STRESS POST EXERCISE DUR MIN: 10 MIN
STRESS POST EXERCISE DUR SEC: 0 SEC
STRESS POST O2 SAT PEAK: 98 %
STRESS POST PEAK BP: 140 MMHG
STRESS ST DEPRESSION: 1 MM

## 2024-11-22 PROCEDURE — 93350 STRESS TTE ONLY: CPT

## 2024-11-22 PROCEDURE — 93350 STRESS TTE ONLY: CPT | Performed by: INTERNAL MEDICINE

## 2024-11-26 ENCOUNTER — TELEMEDICINE (OUTPATIENT)
Dept: FAMILY MEDICINE CLINIC | Facility: CLINIC | Age: 39
End: 2024-11-26
Payer: COMMERCIAL

## 2024-11-26 VITALS — WEIGHT: 145 LBS | BODY MASS INDEX: 19.64 KG/M2 | HEIGHT: 72 IN

## 2024-11-26 DIAGNOSIS — K21.9 GASTROESOPHAGEAL REFLUX DISEASE, UNSPECIFIED WHETHER ESOPHAGITIS PRESENT: Primary | ICD-10-CM

## 2024-11-26 DIAGNOSIS — F41.0 PANIC: ICD-10-CM

## 2024-11-26 DIAGNOSIS — R07.9 CHEST PAIN, UNSPECIFIED TYPE: ICD-10-CM

## 2024-11-26 DIAGNOSIS — R68.84 JAW PAIN: Primary | ICD-10-CM

## 2024-11-26 DIAGNOSIS — R07.89 OTHER CHEST PAIN: ICD-10-CM

## 2024-11-26 LAB
CHEST PAIN STATEMENT: NORMAL
MAX DIASTOLIC BP: 78 MMHG
MAX PREDICTED HEART RATE: 184 BPM
PROTOCOL NAME: NORMAL
STRESS POST EXERCISE DUR MIN: 10 MIN
STRESS POST EXERCISE DUR SEC: 0 SEC
STRESS POST PEAK HR: 166 BPM
STRESS POST PEAK SYSTOLIC BP: 148 MMHG
TARGET HR FORMULA: NORMAL
TEST INDICATION: NORMAL

## 2024-11-26 PROCEDURE — 99214 OFFICE O/P EST MOD 30 MIN: CPT | Performed by: FAMILY MEDICINE

## 2024-11-26 RX ORDER — PANTOPRAZOLE SODIUM 40 MG/1
40 TABLET, DELAYED RELEASE ORAL 2 TIMES DAILY
Qty: 60 TABLET | Refills: 1 | Status: SHIPPED | OUTPATIENT
Start: 2024-11-26 | End: 2025-05-25

## 2024-11-26 RX ORDER — PANTOPRAZOLE SODIUM 40 MG/1
40 TABLET, DELAYED RELEASE ORAL
Qty: 60 TABLET | Refills: 1 | Status: SHIPPED | OUTPATIENT
Start: 2024-11-26 | End: 2024-11-26 | Stop reason: SDUPTHER

## 2024-11-26 NOTE — ASSESSMENT & PLAN NOTE
Helped by muscle relaxant  Likely tmj  F/u with oral surgery   Declines PT  Robaxin at night prn

## 2024-11-26 NOTE — PROGRESS NOTES
Virtual Regular Visit  Name: Fransisco Ricardo      : 1985      MRN: [id number]  Encounter Provider: Yanira Ibrahim DO  Encounter Date: 2024   Encounter department: [facility]      Verification of patient location:  Patient is located at Other in the following state in which I hold an active license [place] :  Assessment & Plan  Jaw pain  Helped by muscle relaxant  Likely tmj  F/u with oral surgery   Declines PT  Robaxin at night prn       Other chest pain  Suspect this is reflux as it has improved with meds  Increase protonix to BID  Refer to GI       Panic  Ativan prn--discussed that pt could take this if he needs to.  He has decided not to thus far           Encounter provider Yanira Ibrahim DO    The patient was identified by name and date of birth. Fransisco Ricardo was informed that this is a telemedicine visit and that the visit is being conducted through the Epic Embedded platform. He agrees to proceed..  My office door was closed. No one else was in the room.  He acknowledged consent and understanding of privacy and security of the video platform. The patient has agreed to participate and understands they can discontinue the visit at any time.    Patient is aware this is a billable service.     History of Present Illness     HPI  Pt presents in f/u from last week.  Negative stress.  Seeing cardiology in the coming weeks.  Taking protonix once daily and pepcid BID and most of the bad chest pain is gone.  Still has some breakthrough.  Hx of a lot of NSAID use during his life.  Appetite is returning over the past week     Hasn't taken mobic for jaw pain as it irritates his stomach.  He did take 1/2 robaxin last night and states that it was helpful--jaw doesn't hurt as much today.    Pt still gets very anxious with the jaw pain.  Didn't take any ativan and doesn't want daily anxiety medication    Review of Systems  See hpi; all other systems negative    Objective   Ht 6' (1.829 m)   Wt  65.8 kg (145 lb)   BMI 19.67 kg/m²     Physical Exam  Constitutional:       Appearance: Normal appearance.   HENT:      Head: Normocephalic and atraumatic.   Eyes:      Extraocular Movements: Extraocular movements intact.      Conjunctiva/sclera: Conjunctivae normal.   Pulmonary:      Effort: Pulmonary effort is normal. No respiratory distress.   Neurological:      General: No focal deficit present.      Mental Status: He is alert and oriented to person, place, and time.   Psychiatric:         Mood and Affect: Mood is anxious.         Behavior: Behavior normal.         Thought Content: Thought content normal.         Judgment: Judgment normal.         Visit Time  Total Visit Duration: 15

## 2024-11-29 ENCOUNTER — OFFICE VISIT (OUTPATIENT)
Dept: GASTROENTEROLOGY | Facility: MEDICAL CENTER | Age: 39
End: 2024-11-29
Payer: COMMERCIAL

## 2024-11-29 ENCOUNTER — TELEPHONE (OUTPATIENT)
Dept: GASTROENTEROLOGY | Facility: MEDICAL CENTER | Age: 39
End: 2024-11-29

## 2024-11-29 VITALS
BODY MASS INDEX: 19.16 KG/M2 | WEIGHT: 141.5 LBS | HEART RATE: 109 BPM | HEIGHT: 72 IN | DIASTOLIC BLOOD PRESSURE: 82 MMHG | OXYGEN SATURATION: 98 % | SYSTOLIC BLOOD PRESSURE: 122 MMHG | TEMPERATURE: 98.7 F

## 2024-11-29 DIAGNOSIS — R07.9 CHEST PAIN, UNSPECIFIED TYPE: Primary | ICD-10-CM

## 2024-11-29 DIAGNOSIS — K21.9 GASTROESOPHAGEAL REFLUX DISEASE, UNSPECIFIED WHETHER ESOPHAGITIS PRESENT: ICD-10-CM

## 2024-11-29 PROCEDURE — 99203 OFFICE O/P NEW LOW 30 MIN: CPT | Performed by: NURSE PRACTITIONER

## 2024-11-29 RX ORDER — SODIUM CHLORIDE, SODIUM LACTATE, POTASSIUM CHLORIDE, CALCIUM CHLORIDE 600; 310; 30; 20 MG/100ML; MG/100ML; MG/100ML; MG/100ML
125 INJECTION, SOLUTION INTRAVENOUS CONTINUOUS
OUTPATIENT
Start: 2024-11-29

## 2024-11-29 NOTE — TELEPHONE ENCOUNTER
Procedure: EGD  Date: 12/23/24  Physician performing: Dr. Jaiyeola  Location of procedure:  [facility] [place]  Instructions given to patient: EGD  Diabetic: No  Clearances: N/A

## 2024-11-29 NOTE — PROGRESS NOTES
Name: Fransisco Ricardo      : 1985      MRN: [id number]  Encounter Provider: MARIO Wagner  Encounter Date: 2024   Encounter department: [facility]  :  Assessment & Plan  Chest pain, unspecified type  Started with heartburn/reflux several months ago.  Was taking famotidine and then switched to pantoprazole 40 mg daily.  This significantly helped his symptoms.  Recently admitted overnight for chest pain.  Cardiac workup was negative.  His pantoprazole was increased to 40 mg twice daily.  He has been on meloxicam for several years on a daily basis.  Denies any nausea, vomiting or dysphagia.  Never had an EGD.  Was drinking 20 ounces of caffeine per day but stopped a week ago.  Does use NSAIDs daily.  Rare alcohol use.  He does report he feels like his tongue is enlarged and that he has jaw pain.  He did see an oral surgeon and was told there were no problems.  No weight loss.  Will rule out PUD, gastritis/esophagitis, EOE and H. pylori.    Orders:    EGD; Future  Continue pantoprazole 40 mg twice daily  -Antireflux diet  -Follow-up in office after testing  Gastroesophageal reflux disease, unspecified whether esophagitis present  Refer above  Orders:    Ambulatory Referral to Gastroenterology    EGD; Future  -Continue pantoprazole 40 mg twice daily    I reviewed with patient/family potential risks of endoscopic evaluation including possible infection, bleeding or perforation.  Patient/family verbalized understanding of potential risks and agreed to procedure(s).      History of Present Illness     HPI  Fransisco Ricardo is a 38 y.o. male who presents with chest pain.  He has a past medical history of anal fissures, ankylosing spondylitis, osteoarthritis, smoker and family history of colon polyps.    Started with heartburn/reflux several months ago.  Was taking famotidine and then switched to pantoprazole 40 mg daily.  This significantly helped his symptoms.   Recently admitted overnight for chest pain.  Cardiac workup was negative.  His pantoprazole was increased to 40 mg twice daily.  He has been on meloxicam for several years on a daily basis.  Denies any nausea, vomiting or dysphagia.  Never had an EGD.  He does report he feels like his tongue is enlarged and that he has jaw pain.  He did see an oral surgeon and was told there were no problems.  No weight loss.  BMs are brown and formed daily.  Denies any melena hematochezia.      No recent abdominal imaging to review.  No recent labs to review.    Previous EGD/colonoscopy      History obtained from: patient    Review of Systems   All other systems reviewed and are negative.    Past Medical History   Past Medical History:   Diagnosis Date    Ankylosing spondylitis (HCC)     Arthritis     AS (ankylosing spondylitis) (HCC) 07/21/2017    Encounter for monitoring of etanercept therapy 01/07/2019    Kidney stone      Past Surgical History:   Procedure Laterality Date    [place] ARTHRS HIP DEBRIDEMENT/SHAVING ARTICULAR CRTLG Left 6/1/2017    Procedure: LEFT HIP ARTHROSCOPIC LABRAL DEBRIDEMENT;  Surgeon: Oscar Ferrara MD;  Location: [facility];  Service: Orthopedics    WISDOM TOOTH EXTRACTION       Family History   Problem Relation Age of Onset    Colon polyps Father     No Known Problems Maternal Grandmother     No Known Problems Maternal Grandfather     No Known Problems Paternal Grandmother     Skin cancer Paternal Grandfather       reports that he has been smoking cigarettes. He has a 7.5 pack-year smoking history. He has never used smokeless tobacco. He reports that he does not currently use drugs after having used the following drugs: Marijuana. He reports that he does not drink alcohol.  Current Outpatient Medications on File Prior to Visit   Medication Sig Dispense Refill    calcium carbonate (TUMS) 500 mg chewable tablet Chew 1 tablet daily      etanercept (ENBREL SURECLICK) 50 MG/ML injection Inject 1 mL (50 mg total)  under the skin every 7 days 12 mL 3    famotidine (PEPCID) 20 mg tablet take 1 tablet by mouth once daily 100 tablet 1    LORazepam (Ativan) 0.5 mg tablet Take 1 tablet (0.5 mg total) by mouth every 8 (eight) hours as needed for anxiety 6 tablet 0    meloxicam (MOBIC) 15 mg tablet take 1 tablet by mouth once daily AS NEEDED FOR ANY JOINT PAIN 90 tablet 1    methocarbamol (ROBAXIN) 500 mg tablet Take 1 tablet (500 mg total) by mouth daily at bedtime as needed for muscle spasms 30 tablet 5    pantoprazole (PROTONIX) 40 mg tablet Take 1 tablet (40 mg total) by mouth 2 (two) times a day 60 tablet 1    traMADol (ULTRAM) 50 mg tablet Take 1 tablet (50 mg total) by mouth in the morning Take 1 tablet daily prn pain 30 tablet 1     No current facility-administered medications on file prior to visit.     Allergies   Allergen Reactions    Other Other (See Comments)     Raw fruits/veggies.       Medical History Reviewed by provider this encounter:  Tobacco  Allergies  Meds  Problems  Med Hx  Surg Hx  Fam Hx     .  Current Outpatient Medications on File Prior to Visit   Medication Sig Dispense Refill    calcium carbonate (TUMS) 500 mg chewable tablet Chew 1 tablet daily      etanercept (ENBREL SURECLICK) 50 MG/ML injection Inject 1 mL (50 mg total) under the skin every 7 days 12 mL 3    famotidine (PEPCID) 20 mg tablet take 1 tablet by mouth once daily 100 tablet 1    LORazepam (Ativan) 0.5 mg tablet Take 1 tablet (0.5 mg total) by mouth every 8 (eight) hours as needed for anxiety 6 tablet 0    meloxicam (MOBIC) 15 mg tablet take 1 tablet by mouth once daily AS NEEDED FOR ANY JOINT PAIN 90 tablet 1    methocarbamol (ROBAXIN) 500 mg tablet Take 1 tablet (500 mg total) by mouth daily at bedtime as needed for muscle spasms 30 tablet 5    pantoprazole (PROTONIX) 40 mg tablet Take 1 tablet (40 mg total) by mouth 2 (two) times a day 60 tablet 1    traMADol (ULTRAM) 50 mg tablet Take 1 tablet (50 mg total) by mouth in the  morning Take 1 tablet daily prn pain 30 tablet 1     No current facility-administered medications on file prior to visit.      Social History     Tobacco Use    Smoking status: Every Day     Current packs/day: 0.50     Average packs/day: 0.5 packs/day for 15.0 years (7.5 ttl pk-yrs)     Types: Cigarettes    Smokeless tobacco: Never   Vaping Use    Vaping status: Never Used   Substance and Sexual Activity    Alcohol use: No    Drug use: Not Currently     Types: Marijuana    Sexual activity: Yes     Partners: Female     Birth control/protection: Condom Male        Objective   /82 (BP Location: Left arm)   Pulse (!) 109   Temp 98.7 °F (37.1 °C)   Ht 6' (1.829 m)   Wt 64.2 kg (141 lb 8 oz)   SpO2 98%   BMI 19.19 kg/m²      Physical Exam  Vitals reviewed.   Constitutional:       Appearance: Normal appearance.   HENT:      Head: Normocephalic.   Cardiovascular:      Rate and Rhythm: Normal rate and regular rhythm.      Heart sounds: Normal heart sounds.   Pulmonary:      Effort: Pulmonary effort is normal.      Breath sounds: Normal breath sounds.   Abdominal:      General: Bowel sounds are normal.      Palpations: Abdomen is soft.   Neurological:      Mental Status: He is alert.

## 2024-11-29 NOTE — H&P (VIEW-ONLY)
Name: Fransisco Ricardo      : 1985      MRN: [id number]  Encounter Provider: MARIO Wagner  Encounter Date: 2024   Encounter department: [facility]  :  Assessment & Plan  Chest pain, unspecified type  Started with heartburn/reflux several months ago.  Was taking famotidine and then switched to pantoprazole 40 mg daily.  This significantly helped his symptoms.  Recently admitted overnight for chest pain.  Cardiac workup was negative.  His pantoprazole was increased to 40 mg twice daily.  He has been on meloxicam for several years on a daily basis.  Denies any nausea, vomiting or dysphagia.  Never had an EGD.  Was drinking 20 ounces of caffeine per day but stopped a week ago.  Does use NSAIDs daily.  Rare alcohol use.  He does report he feels like his tongue is enlarged and that he has jaw pain.  He did see an oral surgeon and was told there were no problems.  No weight loss.  Will rule out PUD, gastritis/esophagitis, EOE and H. pylori.    Orders:    EGD; Future  Continue pantoprazole 40 mg twice daily  -Antireflux diet  -Follow-up in office after testing  Gastroesophageal reflux disease, unspecified whether esophagitis present  Refer above  Orders:    Ambulatory Referral to Gastroenterology    EGD; Future  -Continue pantoprazole 40 mg twice daily    I reviewed with patient/family potential risks of endoscopic evaluation including possible infection, bleeding or perforation.  Patient/family verbalized understanding of potential risks and agreed to procedure(s).      History of Present Illness     HPI  Fransisco Ricardo is a 38 y.o. male who presents with chest pain.  He has a past medical history of anal fissures, ankylosing spondylitis, osteoarthritis, smoker and family history of colon polyps.    Started with heartburn/reflux several months ago.  Was taking famotidine and then switched to pantoprazole 40 mg daily.  This significantly helped his symptoms.   Recently admitted overnight for chest pain.  Cardiac workup was negative.  His pantoprazole was increased to 40 mg twice daily.  He has been on meloxicam for several years on a daily basis.  Denies any nausea, vomiting or dysphagia.  Never had an EGD.  He does report he feels like his tongue is enlarged and that he has jaw pain.  He did see an oral surgeon and was told there were no problems.  No weight loss.  BMs are brown and formed daily.  Denies any melena hematochezia.      No recent abdominal imaging to review.  No recent labs to review.    Previous EGD/colonoscopy      History obtained from: patient    Review of Systems   All other systems reviewed and are negative.    Past Medical History   Past Medical History:   Diagnosis Date    Ankylosing spondylitis (HCC)     Arthritis     AS (ankylosing spondylitis) (HCC) 07/21/2017    Encounter for monitoring of etanercept therapy 01/07/2019    Kidney stone      Past Surgical History:   Procedure Laterality Date    [place] ARTHRS HIP DEBRIDEMENT/SHAVING ARTICULAR CRTLG Left 6/1/2017    Procedure: LEFT HIP ARTHROSCOPIC LABRAL DEBRIDEMENT;  Surgeon: Oscar Ferrara MD;  Location: [facility];  Service: Orthopedics    WISDOM TOOTH EXTRACTION       Family History   Problem Relation Age of Onset    Colon polyps Father     No Known Problems Maternal Grandmother     No Known Problems Maternal Grandfather     No Known Problems Paternal Grandmother     Skin cancer Paternal Grandfather       reports that he has been smoking cigarettes. He has a 7.5 pack-year smoking history. He has never used smokeless tobacco. He reports that he does not currently use drugs after having used the following drugs: Marijuana. He reports that he does not drink alcohol.  Current Outpatient Medications on File Prior to Visit   Medication Sig Dispense Refill    calcium carbonate (TUMS) 500 mg chewable tablet Chew 1 tablet daily      etanercept (ENBREL SURECLICK) 50 MG/ML injection Inject 1 mL (50 mg total)  under the skin every 7 days 12 mL 3    famotidine (PEPCID) 20 mg tablet take 1 tablet by mouth once daily 100 tablet 1    LORazepam (Ativan) 0.5 mg tablet Take 1 tablet (0.5 mg total) by mouth every 8 (eight) hours as needed for anxiety 6 tablet 0    meloxicam (MOBIC) 15 mg tablet take 1 tablet by mouth once daily AS NEEDED FOR ANY JOINT PAIN 90 tablet 1    methocarbamol (ROBAXIN) 500 mg tablet Take 1 tablet (500 mg total) by mouth daily at bedtime as needed for muscle spasms 30 tablet 5    pantoprazole (PROTONIX) 40 mg tablet Take 1 tablet (40 mg total) by mouth 2 (two) times a day 60 tablet 1    traMADol (ULTRAM) 50 mg tablet Take 1 tablet (50 mg total) by mouth in the morning Take 1 tablet daily prn pain 30 tablet 1     No current facility-administered medications on file prior to visit.     Allergies   Allergen Reactions    Other Other (See Comments)     Raw fruits/veggies.       Medical History Reviewed by provider this encounter:  Tobacco  Allergies  Meds  Problems  Med Hx  Surg Hx  Fam Hx     .  Current Outpatient Medications on File Prior to Visit   Medication Sig Dispense Refill    calcium carbonate (TUMS) 500 mg chewable tablet Chew 1 tablet daily      etanercept (ENBREL SURECLICK) 50 MG/ML injection Inject 1 mL (50 mg total) under the skin every 7 days 12 mL 3    famotidine (PEPCID) 20 mg tablet take 1 tablet by mouth once daily 100 tablet 1    LORazepam (Ativan) 0.5 mg tablet Take 1 tablet (0.5 mg total) by mouth every 8 (eight) hours as needed for anxiety 6 tablet 0    meloxicam (MOBIC) 15 mg tablet take 1 tablet by mouth once daily AS NEEDED FOR ANY JOINT PAIN 90 tablet 1    methocarbamol (ROBAXIN) 500 mg tablet Take 1 tablet (500 mg total) by mouth daily at bedtime as needed for muscle spasms 30 tablet 5    pantoprazole (PROTONIX) 40 mg tablet Take 1 tablet (40 mg total) by mouth 2 (two) times a day 60 tablet 1    traMADol (ULTRAM) 50 mg tablet Take 1 tablet (50 mg total) by mouth in the  morning Take 1 tablet daily prn pain 30 tablet 1     No current facility-administered medications on file prior to visit.      Social History     Tobacco Use    Smoking status: Every Day     Current packs/day: 0.50     Average packs/day: 0.5 packs/day for 15.0 years (7.5 ttl pk-yrs)     Types: Cigarettes    Smokeless tobacco: Never   Vaping Use    Vaping status: Never Used   Substance and Sexual Activity    Alcohol use: No    Drug use: Not Currently     Types: Marijuana    Sexual activity: Yes     Partners: Female     Birth control/protection: Condom Male        Objective   /82 (BP Location: Left arm)   Pulse (!) 109   Temp 98.7 °F (37.1 °C)   Ht 6' (1.829 m)   Wt 64.2 kg (141 lb 8 oz)   SpO2 98%   BMI 19.19 kg/m²      Physical Exam  Vitals reviewed.   Constitutional:       Appearance: Normal appearance.   HENT:      Head: Normocephalic.   Cardiovascular:      Rate and Rhythm: Normal rate and regular rhythm.      Heart sounds: Normal heart sounds.   Pulmonary:      Effort: Pulmonary effort is normal.      Breath sounds: Normal breath sounds.   Abdominal:      General: Bowel sounds are normal.      Palpations: Abdomen is soft.   Neurological:      Mental Status: He is alert.

## 2024-12-05 NOTE — ASSESSMENT & PLAN NOTE
Patient reported resolution of symptoms.  Denies angina or anginal equivalent  He recently started taking Protonix and reported resolution of heartburn . He has an EGD scheduled in a few weeks  He underwent an echocardiogram which was unremarkable.  Stress echocardiogram shows Inferior ST segment depressions suggestive of ischemia noted at peak exercise on ECG with early resolution during recovery.  Patient had jaw pain at stage I that improved with exercise.  No regional wall motion abnormalities seen at peak stress on echocardiogram.  Likely normal study with false positive ECG changes.  - chest pain likely from GERD, which is now controlled. No further cardiac testing required   - Regarding his reported palpitations during recent hospitalization, advised to complete 1 week ZIO monitor for completeness  - Discussed primary prevention measures to maintain heart health, reinforce stress management

## 2024-12-05 NOTE — PROGRESS NOTES
[facility]  General Cardiology Note  Fransisco Ricardo  1985  [id number]      Referring Provider - No ref. provider found  Chief Complaint   Patient presents with    Hospital Follow-up     11/13 hosp. No current cardiac complaints.        Assessment & Plan  Other chest pain  Patient reported resolution of symptoms.  Denies angina or anginal equivalent  He recently started taking Protonix and reported resolution of heartburn . He has an EGD scheduled in a few weeks  He underwent an echocardiogram which was unremarkable.  Stress echocardiogram shows Inferior ST segment depressions suggestive of ischemia noted at peak exercise on ECG with early resolution during recovery.  Patient had jaw pain at stage I that improved with exercise.  No regional wall motion abnormalities seen at peak stress on echocardiogram.  Likely normal study with false positive ECG changes.  - chest pain likely from GERD, which is now controlled. No further cardiac testing required   - Regarding his reported palpitations during recent hospitalization, advised to complete 1 week ZIO monitor for completeness  - Discussed primary prevention measures to maintain heart health, reinforce stress management  Jaw pain  Resolved    Will RTO PRN. Will call with any concerns.     Interval History: 38 y.o.  male  with PMH as below is here for HFU  Patient of Dr. Elliott    - Admitted 11/13-11/14/2024 with jaw pain , chest pain/palpitations. Patient has no prior cardiac history.  He reports his chest pain is associated with shortness of breath and palpitations with a sudden onset and gradual offset.  His symptoms last for 20 to 30 seconds and occur randomly.  They are not related to exertion.  He has no pleuritic chest pain.  He had an ECG performed which revealed nonspecific ST and T wave abnormalities with repeat showing normalization. He reports that he is under stress as he is undergoing a break-up.  - He underwent an echocardiogram which  was unremarkable.  -11/22/2024 echo stress test was negative for ischemia  - He was also ordered a 1 week Zio monitor but has not completed yet    Today, patient reports resolution of symptoms.  Denies any chest pain, jaw pain, palpitations, SOB, dizziness.  He states that he thinks his symptoms during recent hospitalization were due to his stomach issues.  He has been taking  NSAIDs frequently due to his ankylosing spondylitis.  He states that since he started taking Protonix his heartburn has resolved.  He has an EGD scheduled in a few weeks.       Patient Active Problem List    Diagnosis Date Noted    Severe major depressive disorder ([facility]) 11/19/2024    Jaw pain 11/14/2024    Other chest pain 11/13/2024    Abnormal EKG 11/13/2024    Smokes tobacco daily 11/13/2024    Anal pain 04/04/2024    Rectal bleeding 04/04/2024    Anal fissure 04/04/2024    Family history of hyperplastic colon polyps 04/04/2024    High risk medication use 03/04/2024    Long-term use of immunosuppressant medication 03/04/2024    Ankylosing spondylitis of multiple sites in spine ([facility]) 10/03/2019    NSAID long-term use 01/07/2019    AS (ankylosing spondylitis) ([facility]) 07/21/2017    Primary localized osteoarthritis of left hip 02/21/2017     Past Medical History:   Diagnosis Date    Ankylosing spondylitis ([facility])     Arthritis     AS (ankylosing spondylitis) ([facility]) 07/21/2017    Encounter for monitoring of etanercept therapy 01/07/2019    Kidney stone      Social History     Tobacco Use    Smoking status: Every Day     Current packs/day: 0.50     Average packs/day: 0.5 packs/day for 15.0 years (7.5 ttl pk-yrs)     Types: Cigarettes    Smokeless tobacco: Never   Vaping Use    Vaping status: Never Used   Substance Use Topics    Alcohol use: No    Drug use: Not Currently     Types: Marijuana      Family History   Problem Relation Age of Onset    Colon polyps Father     No Known Problems Maternal Grandmother     No Known Problems Maternal Grandfather      No Known Problems Paternal Grandmother     Skin cancer Paternal Grandfather      Past Surgical History:   Procedure Laterality Date    [place] ARTHRS HIP DEBRIDEMENT/SHAVING ARTICULAR CRTLG Left 6/1/2017    Procedure: LEFT HIP ARTHROSCOPIC LABRAL DEBRIDEMENT;  Surgeon: Oscar Ferrara MD;  Location: [facility];  Service: Orthopedics    WISDOM TOOTH EXTRACTION         Current Outpatient Medications:     calcium carbonate (TUMS) 500 mg chewable tablet, Chew 1 tablet daily, Disp: , Rfl:     etanercept (ENBREL SURECLICK) 50 MG/ML injection, Inject 1 mL (50 mg total) under the skin every 7 days, Disp: 12 mL, Rfl: 3    famotidine (PEPCID) 20 mg tablet, take 1 tablet by mouth once daily, Disp: 100 tablet, Rfl: 1    LORazepam (Ativan) 0.5 mg tablet, Take 1 tablet (0.5 mg total) by mouth every 8 (eight) hours as needed for anxiety, Disp: 6 tablet, Rfl: 0    methocarbamol (ROBAXIN) 500 mg tablet, Take 1 tablet (500 mg total) by mouth daily at bedtime as needed for muscle spasms, Disp: 30 tablet, Rfl: 5    pantoprazole (PROTONIX) 40 mg tablet, Take 1 tablet (40 mg total) by mouth 2 (two) times a day, Disp: 60 tablet, Rfl: 1    traMADol (ULTRAM) 50 mg tablet, Take 1 tablet (50 mg total) by mouth in the morning Take 1 tablet daily prn pain, Disp: 30 tablet, Rfl: 1    meloxicam (MOBIC) 15 mg tablet, take 1 tablet by mouth once daily AS NEEDED FOR ANY JOINT PAIN (Patient not taking: Reported on 12/6/2024), Disp: 90 tablet, Rfl: 1    Allergies   Allergen Reactions    Other Other (See Comments)     Raw fruits/veggies.        Vitals:    12/06/24 0949   BP: 114/60   BP Location: Left arm   Patient Position: Sitting   Cuff Size: Standard   Pulse: 77   SpO2: 100%   Weight: 65.3 kg (144 lb)   Height: 6' (1.829 m)        Vitals:    12/06/24 0949   Weight: 65.3 kg (144 lb)      Height: 6' (182.9 cm)   Body mass index is 19.53 kg/m².    Labs:   Lab Results   Component Value Date/Time    CHOLESTEROL 130 02/16/2024 06:21 AM    TRIG 58 02/16/2024  06:21 AM    HDL 35 (L) 02/16/2024 06:21 AM    LDLCALC 83 02/16/2024 06:21 AM      Lab Results   Component Value Date    SODIUM 139 11/13/2024    K 4.0 11/13/2024     11/13/2024    CREATININE 0.99 11/13/2024    EGFR 97 11/13/2024    BUN 16 11/13/2024    CO2 30 11/13/2024    ALT 12 11/13/2024    AST 10 (L) 11/13/2024    GLUF 86 01/19/2024    WBC 11.88 (H) 11/13/2024    HGB 14.6 11/13/2024    HCT 45.1 11/13/2024     11/13/2024         Imaging: Stress strip  Result Date: 11/26/2024  Narrative: Confirmed by KEITH AGUIRRE ([id number]),  Mayra Menchaca ([id number]) on 11/26/2024 10:53:44 AM    Echo stress test, exercise  Result Date: 11/22/2024  Narrative:   Left Ventricle: Left ventricular cavity size is normal. Wall thickness is normal. The left ventricular ejection fraction is 50-55%. Systolic function is low normal. Wall motion is normal.   Stress ECG: Horizontal ST depression of 1.0 mm in the inferolateral leads (II, III, aVF, V5 and V6) is noted. ST depression began at minute 5 of stress and ended immediately during recovery. The ECG was positive for ischemia.   Peak Stress Echo: The peak stress echo showed normal wall motion.   Resting ECG: The ECG shows normal sinus rhythm. The ECG has early repolarization. 1.  Inferior ST segment depressions suggestive of ischemia noted at peak exercise on ECG with early resolution during recovery.  Patient had jaw pain at stage I that improved with exercise.  2.  No regional wall motion abnormalities seen at peak stress on echocardiogram.  Likely normal study with false positive ECG changes.     Echo complete w/ contrast if indicated  Result Date: 11/14/2024  Narrative:   Left Ventricle: Left ventricular cavity size is normal. Wall thickness is normal. The left ventricular ejection fraction is 55% by visual estimation. Systolic function is normal. Wall motion is normal. Diastolic function is normal for age.  Left atrial filling pressure is normal.   Right Ventricle:  Systolic function is normal.   No significant valvular abnormalities noted.     XR chest 2 views  Result Date: 11/13/2024  Narrative: XR CHEST PA AND LATERAL INDICATION: Shortness of breath. COMPARISON: Chest radiograph 2/15/2006 FINDINGS: Clear lungs. No pneumothorax or pleural effusion. Normal cardiomediastinal silhouette. Bones are unremarkable for age. Normal upper abdomen.     Impression: No acute cardiopulmonary disease. Resident: REJI Garcia I, the attending radiologist, have reviewed the images and agree with the final report above. Workstation performed: [id number]       Review of Systems   Constitutional: Negative for chills, diaphoresis, fever and malaise/fatigue.   Cardiovascular:  Negative for chest pain, dyspnea on exertion, leg swelling, orthopnea, palpitations and syncope.   Respiratory:  Negative for cough and shortness of breath.    Gastrointestinal:  Negative for abdominal pain, nausea and vomiting.   Neurological:  Negative for dizziness, headaches and light-headedness.   Psychiatric/Behavioral:  Negative for altered mental status.    All other systems reviewed and are negative.    Except as noted in HPI, is otherwise reviewed in detail and a 12 point review of systems is negative.    Physical Exam  Vitals reviewed.   Constitutional:       General: He is not in acute distress.     Appearance: Normal appearance. He is not diaphoretic.   HENT:      Head: Normocephalic and atraumatic.   Eyes:      General: No scleral icterus.     Extraocular Movements: Extraocular movements intact.   Cardiovascular:      Rate and Rhythm: Normal rate.      Pulses: Normal pulses.           Radial pulses are 2+ on the right side and 2+ on the left side.      Heart sounds: Normal heart sounds, S1 normal and S2 normal.   Pulmonary:      Effort: Pulmonary effort is normal. No tachypnea or respiratory distress.      Breath sounds: Normal breath sounds. No wheezing or rales.   Abdominal:      General: Bowel sounds are  normal. There is no distension.      Palpations: Abdomen is soft.   Musculoskeletal:      Right lower leg: No edema.      Left lower leg: No edema.   Skin:     General: Skin is warm and dry.      Capillary Refill: Capillary refill takes less than 2 seconds.   Neurological:      Mental Status: He is alert and oriented to person, place, and time.      Gait: Gait normal.   Psychiatric:         Mood and Affect: Mood normal.         Behavior: Behavior normal.          **Please Note: This note may have been constructed using a voice recognition system**     Thank you for the opportunity to participate in the care of this patient.   MARIO Crawford

## 2024-12-06 ENCOUNTER — TELEPHONE (OUTPATIENT)
Dept: GASTROENTEROLOGY | Facility: MEDICAL CENTER | Age: 39
End: 2024-12-06

## 2024-12-06 ENCOUNTER — OFFICE VISIT (OUTPATIENT)
Dept: CARDIOLOGY CLINIC | Facility: CLINIC | Age: 39
End: 2024-12-06
Payer: COMMERCIAL

## 2024-12-06 VITALS
SYSTOLIC BLOOD PRESSURE: 114 MMHG | DIASTOLIC BLOOD PRESSURE: 60 MMHG | WEIGHT: 144 LBS | OXYGEN SATURATION: 100 % | BODY MASS INDEX: 19.5 KG/M2 | HEIGHT: 72 IN | HEART RATE: 77 BPM

## 2024-12-06 DIAGNOSIS — R68.84 JAW PAIN: ICD-10-CM

## 2024-12-06 DIAGNOSIS — R07.89 OTHER CHEST PAIN: Primary | ICD-10-CM

## 2024-12-06 PROCEDURE — 99214 OFFICE O/P EST MOD 30 MIN: CPT

## 2024-12-06 NOTE — PATIENT INSTRUCTIONS
Please complete your zio heart monitor  Continue heart healthy diet by limiting saturated fat and added sugars.  Stay active and hydrated  Please call the office if you have any questions

## 2024-12-09 ENCOUNTER — ANESTHESIA (OUTPATIENT)
Dept: ANESTHESIOLOGY | Facility: HOSPITAL | Age: 39
End: 2024-12-09

## 2024-12-09 ENCOUNTER — TELEPHONE (OUTPATIENT)
Dept: GASTROENTEROLOGY | Facility: MEDICAL CENTER | Age: 39
End: 2024-12-09

## 2024-12-09 ENCOUNTER — ANESTHESIA EVENT (OUTPATIENT)
Dept: ANESTHESIOLOGY | Facility: HOSPITAL | Age: 39
End: 2024-12-09

## 2024-12-09 NOTE — TELEPHONE ENCOUNTER
Called and spoke to patient to confirm procedure for 12/23 with Dr. Jaiyeola at [facility], patient has confirmed and had no questions

## 2024-12-16 ENCOUNTER — TELEPHONE (OUTPATIENT)
Dept: CARDIOLOGY CLINIC | Facility: CLINIC | Age: 39
End: 2024-12-16

## 2024-12-21 DIAGNOSIS — G89.29 CHRONIC BILATERAL LOW BACK PAIN WITHOUT SCIATICA: ICD-10-CM

## 2024-12-21 DIAGNOSIS — M45.0 ANKYLOSING SPONDYLITIS OF MULTIPLE SITES IN SPINE (HCC): ICD-10-CM

## 2024-12-21 DIAGNOSIS — M54.50 CHRONIC BILATERAL LOW BACK PAIN WITHOUT SCIATICA: ICD-10-CM

## 2024-12-23 ENCOUNTER — ANESTHESIA (OUTPATIENT)
Dept: GASTROENTEROLOGY | Facility: MEDICAL CENTER | Age: 39
End: 2024-12-23
Payer: COMMERCIAL

## 2024-12-23 ENCOUNTER — ANESTHESIA EVENT (OUTPATIENT)
Dept: GASTROENTEROLOGY | Facility: MEDICAL CENTER | Age: 39
End: 2024-12-23
Payer: COMMERCIAL

## 2024-12-23 ENCOUNTER — HOSPITAL ENCOUNTER (OUTPATIENT)
Dept: GASTROENTEROLOGY | Facility: MEDICAL CENTER | Age: 39
Setting detail: OUTPATIENT SURGERY
Discharge: HOME | End: 2024-12-23
Payer: COMMERCIAL

## 2024-12-23 VITALS
TEMPERATURE: 97 F | HEART RATE: 81 BPM | BODY MASS INDEX: 19.64 KG/M2 | WEIGHT: 145 LBS | RESPIRATION RATE: 18 BRPM | HEIGHT: 72 IN | DIASTOLIC BLOOD PRESSURE: 76 MMHG | SYSTOLIC BLOOD PRESSURE: 129 MMHG | OXYGEN SATURATION: 99 %

## 2024-12-23 DIAGNOSIS — R07.89 OTHER CHEST PAIN: ICD-10-CM

## 2024-12-23 DIAGNOSIS — K21.9 GASTROESOPHAGEAL REFLUX DISEASE, UNSPECIFIED WHETHER ESOPHAGITIS PRESENT: ICD-10-CM

## 2024-12-23 DIAGNOSIS — R07.9 CHEST PAIN, UNSPECIFIED TYPE: ICD-10-CM

## 2024-12-23 PROCEDURE — 88342 IMHCHEM/IMCYTCHM 1ST ANTB: CPT | Performed by: STUDENT IN AN ORGANIZED HEALTH CARE EDUCATION/TRAINING PROGRAM

## 2024-12-23 PROCEDURE — 88305 TISSUE EXAM BY PATHOLOGIST: CPT | Performed by: STUDENT IN AN ORGANIZED HEALTH CARE EDUCATION/TRAINING PROGRAM

## 2024-12-23 PROCEDURE — 43239 EGD BIOPSY SINGLE/MULTIPLE: CPT | Performed by: INTERNAL MEDICINE

## 2024-12-23 RX ORDER — LIDOCAINE HYDROCHLORIDE 20 MG/ML
INJECTION, SOLUTION EPIDURAL; INFILTRATION; INTRACAUDAL; PERINEURAL AS NEEDED
Status: DISCONTINUED | OUTPATIENT
Start: 2024-12-23 | End: 2024-12-23

## 2024-12-23 RX ORDER — SODIUM CHLORIDE 9 MG/ML
125 INJECTION, SOLUTION INTRAVENOUS CONTINUOUS
Status: CANCELLED | OUTPATIENT
Start: 2024-12-23

## 2024-12-23 RX ORDER — MELOXICAM 15 MG/1
TABLET ORAL
Qty: 90 TABLET | Refills: 1 | Status: SHIPPED | OUTPATIENT
Start: 2024-12-23

## 2024-12-23 RX ORDER — PANTOPRAZOLE SODIUM 40 MG/1
40 TABLET, DELAYED RELEASE ORAL
Qty: 60 TABLET | Refills: 1 | Status: SHIPPED | OUTPATIENT
Start: 2024-12-23

## 2024-12-23 RX ORDER — PROPOFOL 10 MG/ML
INJECTION, EMULSION INTRAVENOUS AS NEEDED
Status: DISCONTINUED | OUTPATIENT
Start: 2024-12-23 | End: 2024-12-23

## 2024-12-23 RX ORDER — SODIUM CHLORIDE 9 MG/ML
INJECTION, SOLUTION INTRAVENOUS CONTINUOUS PRN
Status: DISCONTINUED | OUTPATIENT
Start: 2024-12-23 | End: 2024-12-23

## 2024-12-23 RX ADMIN — PROPOFOL 50 MG: 10 INJECTION, EMULSION INTRAVENOUS at 09:06

## 2024-12-23 RX ADMIN — PROPOFOL 40 MG: 10 INJECTION, EMULSION INTRAVENOUS at 09:04

## 2024-12-23 RX ADMIN — PROPOFOL 160 MG: 10 INJECTION, EMULSION INTRAVENOUS at 09:03

## 2024-12-23 RX ADMIN — LIDOCAINE HYDROCHLORIDE 100 MG: 20 INJECTION, SOLUTION EPIDURAL; INFILTRATION; INTRACAUDAL at 09:03

## 2024-12-23 RX ADMIN — SODIUM CHLORIDE: 0.9 INJECTION, SOLUTION INTRAVENOUS at 08:59

## 2024-12-23 NOTE — ANESTHESIA POSTPROCEDURE EVALUATION
Post-Op Assessment Note    CV Status:  Stable    Pain management: adequate       Mental Status:  Sleepy   Hydration Status:  Euvolemic   PONV Controlled:  Controlled   Airway Patency:  Patent     Post Op Vitals Reviewed: Yes    No anethesia notable event occurred.    Staff: CRNA           Last Filed PACU Vitals:  Vitals Value Taken Time   Temp     Pulse     BP     Resp     SpO2         Modified Selina:  Activity: 2 (12/23/2024  8:37 AM)  Respiration: 2 (12/23/2024  8:37 AM)  Circulation: 2 (12/23/2024  8:37 AM)  Consciousness: 2 (12/23/2024  8:37 AM)  Oxygen Saturation: 2 (12/23/2024  8:37 AM)  Modified Selina Score: 10 (12/23/2024  8:37 AM)

## 2024-12-23 NOTE — ANESTHESIA PREPROCEDURE EVALUATION
Procedure:  EGD    Relevant Problems   CARDIO   (+) Other chest pain      GI/HEPATIC   (+) Rectal bleeding      MUSCULOSKELETAL   (+) AS (ankylosing spondylitis) ([facility])   (+) Ankylosing spondylitis of multiple sites in spine ([facility])   (+) Primary localized osteoarthritis of left hip      NEURO/PSYCH   (+) Severe major depressive disorder ([facility])      PULMONARY   (+) Smokes tobacco daily        Physical Exam    Airway    Mallampati score: II  TM Distance: >3 FB  Neck ROM: full     Dental   No notable dental hx     Cardiovascular  Cardiovascular exam normal    Pulmonary  Pulmonary exam normal     Other Findings        Anesthesia Plan  ASA Score- 2     Anesthesia Type- IV sedation with anesthesia with ASA Monitors.         Additional Monitors:     Airway Plan:            Plan Factors-Exercise tolerance (METS): >4 METS.    Chart reviewed.    Patient summary reviewed.    Patient is not a current smoker.              Induction-     Postoperative Plan-     Perioperative Resuscitation Plan - Level 1 - Full Code.       Informed Consent- Anesthetic plan and risks discussed with patient.  I personally reviewed this patient with the CRNA. Discussed and agreed on the Anesthesia Plan with the CRNA..

## 2024-12-27 ENCOUNTER — RESULTS FOLLOW-UP (OUTPATIENT)
Dept: GASTROENTEROLOGY | Facility: MEDICAL CENTER | Age: 39
End: 2024-12-27

## 2024-12-27 DIAGNOSIS — K21.9 GASTROESOPHAGEAL REFLUX DISEASE, UNSPECIFIED WHETHER ESOPHAGITIS PRESENT: Primary | ICD-10-CM

## 2024-12-27 PROCEDURE — 88305 TISSUE EXAM BY PATHOLOGIST: CPT | Performed by: STUDENT IN AN ORGANIZED HEALTH CARE EDUCATION/TRAINING PROGRAM

## 2024-12-27 PROCEDURE — 88342 IMHCHEM/IMCYTCHM 1ST ANTB: CPT | Performed by: STUDENT IN AN ORGANIZED HEALTH CARE EDUCATION/TRAINING PROGRAM

## 2024-12-27 NOTE — TELEPHONE ENCOUNTER
Called and spoke with the patient and relayed Dr. Jaiyeola's message. Patient verbalized understanding of continuing pantoprazole for 8 weeks, stopping after 8 weeks and 2 weeks after stopping to submit a stool sample for which the order has been placed. Informed the patient to reach out to the office with any questions or concerns. Patient thanked us.     ----- Message from Diana Jaiyeola, MD sent at 12/27/2024 11:50 AM EST -----  Please call the patient with the results    Please let him know that the biopsies of his stomach show inflammation.  There was no definitive infection seen on the biopsies but there were findings concerning for an infection with a bacteria called H. pylori.  I recommend he continue to take the pantoprazole for an additional 8 weeks given the small bowel ulcers that were seen on his endoscopy.  After 8 weeks he can stop the pantoprazole.  2 weeks after stopping the pantoprazole he should submit a stool sample to evaluate for H. pylori infection.

## 2024-12-27 NOTE — RESULT ENCOUNTER NOTE
Please call the patient with the results    Please let him know that the biopsies of his stomach show inflammation.  There was no definitive infection seen on the biopsies but there were findings concerning for an infection with a bacteria called H. pylori.  I recommend he continue to take the pantoprazole for an additional 8 weeks given the small bowel ulcers that were seen on his endoscopy.  After 8 weeks he can stop the pantoprazole.  2 weeks after stopping the pantoprazole he should submit a stool sample to evaluate for H. pylori infection.

## 2025-01-10 DIAGNOSIS — K21.9 GASTROESOPHAGEAL REFLUX DISEASE WITHOUT ESOPHAGITIS: ICD-10-CM

## 2025-01-10 RX ORDER — FAMOTIDINE 20 MG/1
20 TABLET, FILM COATED ORAL DAILY
Qty: 90 TABLET | Refills: 1 | Status: SHIPPED | OUTPATIENT
Start: 2025-01-10

## 2025-01-16 ENCOUNTER — OFFICE VISIT (OUTPATIENT)
Dept: PAIN MEDICINE | Facility: CLINIC | Age: 40
End: 2025-01-16
Payer: COMMERCIAL

## 2025-01-16 DIAGNOSIS — F11.20 UNCOMPLICATED OPIOID DEPENDENCE (HCC): ICD-10-CM

## 2025-01-16 DIAGNOSIS — Z79.891 LONG-TERM CURRENT USE OF OPIATE ANALGESIC: ICD-10-CM

## 2025-01-16 DIAGNOSIS — M45.0 ANKYLOSING SPONDYLITIS OF MULTIPLE SITES IN SPINE (HCC): Primary | ICD-10-CM

## 2025-01-16 DIAGNOSIS — M54.9 MID BACK PAIN: ICD-10-CM

## 2025-01-16 DIAGNOSIS — G89.29 CHRONIC BILATERAL LOW BACK PAIN WITHOUT SCIATICA: ICD-10-CM

## 2025-01-16 DIAGNOSIS — M54.50 CHRONIC BILATERAL LOW BACK PAIN WITHOUT SCIATICA: ICD-10-CM

## 2025-01-16 DIAGNOSIS — G89.4 CHRONIC PAIN SYNDROME: ICD-10-CM

## 2025-01-16 PROCEDURE — 99214 OFFICE O/P EST MOD 30 MIN: CPT

## 2025-01-16 RX ORDER — TRAMADOL HYDROCHLORIDE 50 MG/1
50 TABLET ORAL DAILY
Qty: 30 TABLET | Refills: 1 | Status: SHIPPED | OUTPATIENT
Start: 2025-01-16

## 2025-01-16 NOTE — PATIENT INSTRUCTIONS
"Patient Education     Taking opioids safely   The Basics   Written by the doctors and editors at [facility]   What are opioids? -- Opioids are a group of prescription medicines that relieve pain. They work by attaching to \"opioid receptors\" in the body and blocking pain signals.  Opioids are sometimes used when other types of pain medicine do not help enough. Opioids can be helpful for treating short-term, or \"acute,\" pain, like after surgery or an injury. They are also sometimes used to treat long-term, or \"chronic,\" pain, like for people with cancer. But they come with risks.  If your doctor prescribes an opioid medicine, it's important to understand the risks and know how to stay safe.  What are the risks of taking opioids? -- You should know that:   Opioids have side effects. Some are just bothersome, and some can be dangerous. For example, taking too much of an opioid is called an \"overdose.\" An overdose can cause serious problems and even death.   In some cases, taking opioids can lead to misuse. For example, people might take the medicine when they don't need it for pain. Or they might take more than they are supposed to. Sharing or selling opioids are other examples of misuse.   There is a risk of addiction. This is also called \"opioid use disorder.\"  If you take too much, or take opioids with alcohol or certain other drugs, it can cause serious harm. It can even cause death from overdose.  How do I stay safe? -- There are things you can do to stay safe if you need to take an opioid medicine (figure 1). These things help protect yourself and others.  Know your medicines:    Opioids come in different forms. \"Immediate-release\" medicines work quickly and last for a short time. \"Extended-release\" medicines work more slowly and last longer. Make sure that you know what type of opioid you have. Read the label and the information that comes with your prescription.   Follow your treatment plan carefully. Take only " "the dose your doctor prescribes, and only as often as they tell you to.   Never take opioids that were not prescribed to you.   Some opioids come combined with other medicines like acetaminophen or an \"NSAID\" (like ibuprofen). Do not take any extra NSAIDs or acetaminophen without talking to your doctor first.   Make sure that all of your doctors know every medicine you take, even those that are non-prescription. Some medicines can affect the way opioids work. Bring a complete list of all of your pain medicines and other medicines with you whenever you go to a doctor, nurse, dentist, or pharmacist.   Ask your doctor or pharmacist if it is safe to take your other medicines with your opioid medicine.  Use and store your medicine safely:    Do not drink alcohol while you are taking opioids.   Do not take opioids with medicines that make you sleepy, unless your doctor tells you to. Examples include:   \"Benzodiazepines\" like diazepam (sample brand name: Valium) or alprazolam (sample brand name: Xanax)   Gabapentin (sample brand name: Neurontin) or pregabalin (brand name: Lyrica)   Muscle relaxants like baclofen or cyclobenzaprine   Sleeping pills like zolpidem (sample brand name: Ambien)   Talk to your doctor about whether it is safe to drive. Opioids can make you feel tired or have trouble thinking clearly. If you are starting a new prescription or taking a higher dose, you might need to avoid things like driving, using dangerous machinery, or other activities that could be risky.   Store your opioids in a safe place, such as a locked cabinet. This prevents children, teens, or anyone else from getting to them.   Never share your opioids with other people.  Be aware of side effects:    Opioid medicines can cause side effects. There are often ways to prevent or treat these.   Call your doctor or nurse if you have side effects that bother you, such as:   Constipation - Your doctor or nurse might suggest that you take a " "laxative to prevent or treat constipation. If your bowel movements are hard and dry, a stool softener might help. Drink plenty of water, and try to get regular physical activity.   Mild nausea or stomach discomfort - Taking the medicine with or after food can help with this. Nausea usually gets better with time.   Severe nausea, vomiting, or itchiness - If you have any of these problems, your doctor might be able to switch you to a different medicine.   Dry mouth   Feeling dizzy or sleepy, or having trouble thinking clearly   Vision problems   Being clumsy or falling down   Know the signs of an opioid overdose. Get help right away if you think that you or someone else took too much of an opioid medicine. Signs of an overdose are listed below.  Stay safe when stopping your opioid medicine:    When opioids are needed to treat acute pain, doctors usually try to prescribe them for only a short time. This usually means a few days or a week. They also prescribe the lowest dose possible to relieve pain.   Follow your doctor's instructions about how to stop taking your opioid once your pain has improved. This usually involves \"tapering,\" or reducing the dose gradually. If you stop an opioid suddenly, this can cause unpleasant symptoms like stomach ache, diarrhea, or shakes. This is called \"withdrawal.\" Tapering the dose can help prevent withdrawal.   When your pain gets better, get rid of any leftover medicines. Your doctor, nurse, or pharmacist can suggest ways to get rid of them. This might involve flushing them down the toilet or mixing them with something like dirt or cat litter, then putting the mixture in a sealed container in the trash. Some police stations and pharmacies also take leftover medicines.  What is naloxone? -- Naloxone is a medicine that reverses the effects of opioids. It can prevent death from an opioid overdose. Naloxone comes as an injection (shot), or as a spray that goes in the nose. Naloxone nasal " spray (brand name: Narcan) is available without a prescription.  If you or someone you know uses opioids, it's a good idea to keep naloxone with you. Make sure that you and your family and friends know how and when to use it.  When should I call for help? -- If you are taking an opioid, it's important to know when to get help. Signs of an opioid overdose include:   Extreme sleepiness   Slow breathing, or no breathing at all   Very small pupils (the black circles in the center of the eyes)   Very slow heartbeat  If you took too much of your opioid medicine or think that someone is having an opioid overdose:   If you have naloxone, give it immediately. Naloxone can save a person's life. But it needs to be given as soon as possible.   Call for an ambulance right away (in the [place] and [place], call 9-1-1).  Call your doctor or nurse if:   You are having side effects that bother you.   You have questions about how to take your medicine.   You are having trouble managing your pain.  All topics are updated as new evidence becomes available and our peer review process is complete.  This topic retrieved from [employer] on: May 15, 2024.  Topic 015340 Version 1.0  Release: 32.4.3 - C32.134  © 2024 UpToDate, Inc. and/or its affiliates. All rights reserved.  figure 1: Tips for medication safety     Tips to use your medicine safely include:  (A) Read labels so you know how to take your medicines.  (B) Have a routine for taking your medicines.  (C) Make sure you know what foods, drinks, and other medicines are safe for you.  (D) Store medicines in a safe place.  (E) Work with your health care team and ask questions if you have them.  Graphic 862888 Version 2.0  Consumer Information Use and Disclaimer   Disclaimer: This generalized information is a limited summary of diagnosis, treatment, and/or medication information. It is not meant to be comprehensive and should be used as a tool to help the user understand and/or assess potential  diagnostic and treatment options. It does NOT include all information about conditions, treatments, medications, side effects, or risks that may apply to a specific patient. It is not intended to be medical advice or a substitute for the medical advice, diagnosis, or treatment of a health care provider based on the health care provider's examination and assessment of a patient's specific and unique circumstances. Patients must speak with a health care provider for complete information about their health, medical questions, and treatment options, including any risks or benefits regarding use of medications. This information does not endorse any treatments or medications as safe, effective, or approved for treating a specific patient. UpToDate, Inc. and its affiliates disclaim any warranty or liability relating to this information or the use thereof.The use of this information is governed by the Terms of Use, available at https://www.woltersRoomtaguwer.com/en/know/clinical-effectiveness-terms. 2024© UpToDate, Inc. and its affiliates and/or licensors. All rights reserved.  Copyright   © 2024 UpToDate, Inc. and/or its affiliates. All rights reserved.

## 2025-01-16 NOTE — PROGRESS NOTES
Pain Medicine Follow-Up Note    Assessment:  1. Ankylosing spondylitis of multiple sites in spine (HCC)    2. Chronic pain syndrome    3. Mid back pain    4. Chronic bilateral low back pain without sciatica    5. Long-term current use of opiate analgesic    6. Uncomplicated opioid dependence (HCC)        Plan:  The patient is a 39-year-old male with a history of chronic pain secondary to low back pain, lumbar intervertebral disc disorder with radiculopathy and ankylosing spondylitis who presents to the office with ongoing mid back pain that is unchanged since his last office visit.    Overall his pain continues to be managed with taking tramadol, therefore I will continue him on this medication as prescribed. A 1 month prescription for tramadol 50 mg with 1 refill was electronically sent to the patient's pharmacy with a do not fill date of today, 1/16/2025.    An opioid contract was reviewed with the patient.  The patient was made aware they are only to receive opioid medication from our office, and must take the medication as prescribed.  If the medication is lost or stolen, it will not be replaced.  We also do not condone the use of illegal substances or alcohol with opioid medication.  Random urine drug screens and pill counts will also be performed at office visits. Lastly, the patient was informed that office visits are needed for refills.  Patient was agreeable and signed the contract.    The patient was also completed a BECKS depression inventory and SOAPP-R  today, as part of our yearly opioid management program.    Orders Placed This Encounter   Procedures    MM DT_Alprazolam Definitive Test    MM DT_Amphetamine Definitive Test    MM DT_Buprenorphine Definitive Test    MM DT_Butalbital Definitive Test    MM DT_Clonazepam Definitive Test    MM DT_Cocaine Definitive Test    MM DT_Codeine Definitive Test    MM DT_Dextromethorphan Definitive Test    MM Diazepam Definitive Test    MM DT_Ethyl Glucuronide/Ethyl  Sulfate Definitive Test    MM DT_Fentanyl Definitive Test    MM DT_Heroin Definitive Test    MM DT_Hydrocodone Definitive Test    MM DT_Hydromorphone Definitive Test    MM DT_Kratom Definitive Test    MM DT_Levorphanol Definitive Test    MM DT_MDMA Definitive Test    MM DT_Meperidine Definitive Test    MM DT_Methadone Definitive Test    MM DT_Methamphetamine Definitive Test    MM DT_Methylphenidate Definitive Test    MM DT_Morphine Definitive Test    MM Lorazepam Definitive Test    MM DT_Oxazepam Definitive Test    MM DT_Oxycodone Definitive Test    MM DT_Oxymorphone Definitive Test    MM DT_Phencyclidine Definitive Test    MM DT_Phenobarbital Definitive Test    MM DT_Phentermine Definitive Test    MM DT_Secobarbital Definitive Test    MM DT_Spice Definitive Test    MM DT_Tapentadol Definitive Test    MM DT_Temazapam Definitive Test    MM DT_THC Definitive Test    MM DT_Tramadol Definitive Test       New Medications Ordered This Visit   Medications    traMADol (ULTRAM) 50 mg tablet     Sig: Take 1 tablet (50 mg total) by mouth in the morning Take 1 tablet daily prn pain     Dispense:  30 tablet     Refill:  1       My impressions and treatment recommendations were discussed in detail with the patient who verbalized understanding and had no further questions.      [place] Prescription Drug Monitoring Program report was reviewed and was appropriate     A urine drug screen was collected at today's office visit as part of our medication management protocol. The point of care testing results were appropriate for what was being prescribed. The specimen will be sent for confirmatory testing. The drug screen is medically necessary because the patient is either dependent on opioid medication or is being considered for opioid medication therapy and the results could impact ongoing or future treatment. The drug screen is to evaluate for the presences or absence of prescribed, non-prescribed, and/or illicit  drugs/substances.    There are risks associated with opioid medications, including dependence, addiction and tolerance. The patient understands and agrees to use these medications only as prescribed. Potential side effects of the medications include, but are not limited to, constipation, drowsiness, addiction, impaired judgment and risk of fatal overdose if not taken as prescribed. The patient was warned against driving while taking sedation medications.  Sharing medications is a felony. At this point in time, the patient is showing no signs of addiction, abuse, diversion or suicidal ideation.    Follow-up is planned in 16 weeks time or sooner as warranted.  Discharge instructions were provided. I personally saw and examined the patient and I agree with the above discussed plan of care.    History of Present Illness:    Fransisco Ricardo is a 39 y.o. male with a history of chronic pain secondary to low back pain, lumbar intervertebral disc disorder with radiculopathy and ankylosing spondylitis.  He was last seen on 8/13/2024 where he was continued on tramadol 50 mg.  He presents to the office with ongoing mid back pain.  Since his last office visit, he has had to discontinue meloxicam as he was diagnosed with ulcers and a hiatal hernia.  He has been utilizing Tylenol in its place.    He states his pain is the same since the last office visit and constant.  He rates the quality of his pain as dull/aching, stiffness and is currently rating his pain a 7/10 on a numeric scale.    Current pain medications include tramadol 50 mg, 0.5 tablets daily which is providing him moderate relief of his pain without side effects.     Pain Contract Signed:  1/16/25  Last Urine Drug Screen:  1/16/25  Last Dose:1/14/25    Other than as stated above, the patient denies any interval changes in medications, medical condition, mental condition, symptoms, or allergies since the last office visit.         Review of Systems:    Review of Systems    Respiratory:  Negative for shortness of breath.    Cardiovascular:  Negative for chest pain.   Gastrointestinal:  Negative for constipation, diarrhea, nausea and vomiting.   Musculoskeletal:  Positive for back pain and joint swelling. Negative for arthralgias, gait problem and myalgias.   Skin:  Negative for rash.   Neurological:  Negative for dizziness, seizures and weakness.   All other systems reviewed and are negative.        Past Medical History:   Diagnosis Date    Ankylosing spondylitis ([facility])     Arthritis     AS (ankylosing spondylitis) ([facility]) 07/21/2017    Encounter for monitoring of etanercept therapy 01/07/2019    GERD (gastroesophageal reflux disease)     Kidney stone        Past Surgical History:   Procedure Laterality Date    AR ARTHRS HIP DEBRIDEMENT/SHAVING ARTICULAR CRTLG Left 6/1/2017    Procedure: LEFT HIP ARTHROSCOPIC LABRAL DEBRIDEMENT;  Surgeon: Oscar Ferrara MD;  Location: [facility];  Service: Orthopedics    WISDOM TOOTH EXTRACTION         Family History   Problem Relation Age of Onset    Colon polyps Father     No Known Problems Maternal Grandmother     No Known Problems Maternal Grandfather     No Known Problems Paternal Grandmother     Skin cancer Paternal Grandfather        Social History     Occupational History    Not on file   Tobacco Use    Smoking status: Every Day     Current packs/day: 0.50     Average packs/day: 0.5 packs/day for 15.0 years (7.5 ttl pk-yrs)     Types: Cigarettes    Smokeless tobacco: Never   Vaping Use    Vaping status: Never Used   Substance and Sexual Activity    Alcohol use: No    Drug use: Not Currently    Sexual activity: Yes     Partners: Female     Birth control/protection: Condom Male         Current Outpatient Medications:     calcium carbonate (TUMS) 500 mg chewable tablet, Chew 1 tablet daily, Disp: , Rfl:     etanercept (ENBREL SURECLICK) 50 MG/ML injection, Inject 1 mL (50 mg total) under the skin every 7 days, Disp: 12 mL, Rfl: 3    famotidine  (PEPCID) 20 mg tablet, take 1 tablet by mouth once daily, Disp: 90 tablet, Rfl: 1    LORazepam (Ativan) 0.5 mg tablet, Take 1 tablet (0.5 mg total) by mouth every 8 (eight) hours as needed for anxiety, Disp: 6 tablet, Rfl: 0    meloxicam (MOBIC) 15 mg tablet, take 1 tablet by mouth once daily if needed for JOINT PAIN, Disp: 90 tablet, Rfl: 1    methocarbamol (ROBAXIN) 500 mg tablet, Take 1 tablet (500 mg total) by mouth daily at bedtime as needed for muscle spasms, Disp: 30 tablet, Rfl: 5    pantoprazole (PROTONIX) 40 mg tablet, Take 1 tablet (40 mg total) by mouth daily before breakfast, Disp: 60 tablet, Rfl: 1    traMADol (ULTRAM) 50 mg tablet, Take 1 tablet (50 mg total) by mouth in the morning Take 1 tablet daily prn pain, Disp: 30 tablet, Rfl: 1    Allergies   Allergen Reactions    Other Other (See Comments)     Raw fruits/veggies.        Physical Exam:    There were no vitals taken for this visit.    Constitutional:normal, well developed, well nourished, alert, in no distress and non-toxic and no overt pain behavior.  Eyes:anicteric  HEENT:grossly intact  Neck:supple, symmetric, trachea midline and no masses   Pulmonary:even and unlabored  Cardiovascular:No edema or pitting edema present  Skin:Normal without rashes or lesions and well hydrated  Psychiatric:Mood and affect appropriate  Neurologic:Cranial Nerves II-XII grossly intact  Musculoskeletal:normal      Imaging  No orders to display         Orders Placed This Encounter   Procedures    MM DT_Alprazolam Definitive Test    MM DT_Amphetamine Definitive Test    MM DT_Buprenorphine Definitive Test    MM DT_Butalbital Definitive Test    MM DT_Clonazepam Definitive Test    MM DT_Cocaine Definitive Test    MM DT_Codeine Definitive Test    MM DT_Dextromethorphan Definitive Test    MM Diazepam Definitive Test    MM DT_Ethyl Glucuronide/Ethyl Sulfate Definitive Test    MM DT_Fentanyl Definitive Test    MM DT_Heroin Definitive Test    MM DT_Hydrocodone Definitive  Test    MM DT_Hydromorphone Definitive Test    MM DT_Kratom Definitive Test    MM DT_Levorphanol Definitive Test    MM DT_MDMA Definitive Test    MM DT_Meperidine Definitive Test    MM DT_Methadone Definitive Test    MM DT_Methamphetamine Definitive Test    MM DT_Methylphenidate Definitive Test    MM DT_Morphine Definitive Test    MM Lorazepam Definitive Test    MM DT_Oxazepam Definitive Test    MM DT_Oxycodone Definitive Test    MM DT_Oxymorphone Definitive Test    MM DT_Phencyclidine Definitive Test    MM DT_Phenobarbital Definitive Test    MM DT_Phentermine Definitive Test    MM DT_Secobarbital Definitive Test    MM DT_Spice Definitive Test    MM DT_Tapentadol Definitive Test    MM DT_Temazapam Definitive Test    MM DT_THC Definitive Test    MM DT_Tramadol Definitive Test

## 2025-01-18 LAB
6MAM UR QL CFM: NEGATIVE NG/ML
7AMINOCLONAZEPAM UR QL CFM: NEGATIVE NG/ML
A-OH ALPRAZ UR QL CFM: NEGATIVE NG/ML
AMPHET UR QL CFM: NEGATIVE NG/ML
AMPHET UR QL CFM: NEGATIVE NG/ML
BUPRENORPHINE UR QL CFM: NEGATIVE NG/ML
BUTALBITAL UR QL CFM: NEGATIVE NG/ML
BZE UR QL CFM: NEGATIVE NG/ML
CODEINE UR QL CFM: NEGATIVE NG/ML
EDDP UR QL CFM: NEGATIVE NG/ML
ETHYL GLUCURONIDE UR QL CFM: NEGATIVE NG/ML
ETHYL SULFATE UR QL SCN: NEGATIVE NG/ML
FENTANYL UR QL CFM: NEGATIVE NG/ML
GLIADIN IGG SER IA-ACNC: NEGATIVE NG/ML
HYDROCODONE UR QL CFM: NEGATIVE NG/ML
HYDROCODONE UR QL CFM: NEGATIVE NG/ML
HYDROMORPHONE UR QL CFM: NEGATIVE NG/ML
LORAZEPAM UR QL CFM: ABNORMAL NG/ML
MDMA UR QL CFM: NEGATIVE NG/ML
ME-PHENIDATE UR QL CFM: NEGATIVE NG/ML
MEPERIDINE UR QL CFM: NEGATIVE NG/ML
METHADONE UR QL CFM: NEGATIVE NG/ML
METHAMPHET UR QL CFM: NEGATIVE NG/ML
MORPHINE UR QL CFM: NEGATIVE NG/ML
MORPHINE UR QL CFM: NEGATIVE NG/ML
NORBUPRENORPHINE UR QL CFM: NEGATIVE NG/ML
NORDIAZEPAM UR QL CFM: NEGATIVE NG/ML
NORFENTANYL UR QL CFM: NEGATIVE NG/ML
NORHYDROCODONE UR QL CFM: NEGATIVE NG/ML
NORHYDROCODONE UR QL CFM: NEGATIVE NG/ML
NORMEPERIDINE UR QL CFM: NEGATIVE NG/ML
NOROXYCODONE UR QL CFM: NEGATIVE NG/ML
OXAZEPAM UR QL CFM: NEGATIVE NG/ML
OXYCODONE UR QL CFM: NEGATIVE NG/ML
OXYMORPHONE UR QL CFM: NEGATIVE NG/ML
OXYMORPHONE UR QL CFM: NEGATIVE NG/ML
PARA-FLUOROFENTANYL QUANTIFICATION: NORMAL NG/ML
PCP UR QL CFM: NEGATIVE NG/ML
PHENOBARB UR QL CFM: NEGATIVE NG/ML
SECOBARBITAL UR QL CFM: NEGATIVE NG/ML
SL AMB 5F-ADB-M7 METABOLITE QUANTIFICATION: NEGATIVE NG/ML
SL AMB 7-OH-MITRAGYNINE (KRATOM ALKALOID) QUANTIFICATION: NEGATIVE NG/ML
SL AMB AB-FUBINACA-M3 METABOLITE QUANTIFICATION: NEGATIVE NG/ML
SL AMB ACETYL FENTANYL QUANTIFICATION: NORMAL NG/ML
SL AMB ACETYL NORFENTANYL QUANTIFICATION: NORMAL NG/ML
SL AMB ACRYL FENTANYL QUANTIFICATION: NORMAL NG/ML
SL AMB CARFENTANIL QUANTIFICATION: NORMAL NG/ML
SL AMB CTHC (MARIJUANA METABOLITE) QUANTIFICATION: NEGATIVE NG/ML
SL AMB DEXTROMETHORPHAN QUANTIFICATION: NEGATIVE NG/ML
SL AMB DEXTRORPHAN (DEXTROMETHORPHAN METABOLITE) QUANT: NEGATIVE NG/ML
SL AMB DEXTRORPHAN (DEXTROMETHORPHAN METABOLITE) QUANT: NEGATIVE NG/ML
SL AMB JWH018 METABOLITE QUANTIFICATION: NEGATIVE NG/ML
SL AMB JWH073 METABOLITE QUANTIFICATION: NEGATIVE NG/ML
SL AMB MDMB-FUBINACA-M1 METABOLITE QUANTIFICATION: NEGATIVE NG/ML
SL AMB N-DESMETHYL-TRAMADOL QUANTIFICATION: ABNORMAL NG/ML
SL AMB PHENTERMINE QUANTIFICATION: NEGATIVE NG/ML
SL AMB RCS4 METABOLITE QUANTIFICATION: NEGATIVE NG/ML
SL AMB RITALINIC ACID QUANTIFICATION: NEGATIVE NG/ML
SPECIMEN DRAWN SERPL: NEGATIVE NG/ML
TAPENTADOL UR QL CFM: NEGATIVE NG/ML
TEMAZEPAM UR QL CFM: NEGATIVE NG/ML
TEMAZEPAM UR QL CFM: NEGATIVE NG/ML
TRAMADOL UR QL CFM: ABNORMAL NG/ML
URATE/CREAT 24H UR: ABNORMAL NG/ML

## 2025-02-06 ENCOUNTER — RA CDI HCC (OUTPATIENT)
Dept: OTHER | Facility: HOSPITAL | Age: 40
End: 2025-02-06

## 2025-02-06 NOTE — PROGRESS NOTES
HCC coding opportunities       Chart reviewed, no opportunity found: CHART REVIEWED, NO OPPORTUNITY FOUND   This is a reminder to address (resolve/update/assess) ALL HCC (risk adjustment) codes as found on active problem list for 2025 as patient scores reset to zero YOAV.     Patients Insurance        Commercial Insurance: [employer]

## 2025-02-14 ENCOUNTER — APPOINTMENT (OUTPATIENT)
Dept: LAB | Facility: CLINIC | Age: 40
End: 2025-02-14
Payer: COMMERCIAL

## 2025-02-14 ENCOUNTER — OFFICE VISIT (OUTPATIENT)
Dept: FAMILY MEDICINE CLINIC | Facility: CLINIC | Age: 40
End: 2025-02-14
Payer: COMMERCIAL

## 2025-02-14 VITALS
BODY MASS INDEX: 19.64 KG/M2 | SYSTOLIC BLOOD PRESSURE: 120 MMHG | TEMPERATURE: 95.5 F | HEIGHT: 72 IN | RESPIRATION RATE: 16 BRPM | DIASTOLIC BLOOD PRESSURE: 80 MMHG | HEART RATE: 94 BPM | WEIGHT: 145 LBS | OXYGEN SATURATION: 99 %

## 2025-02-14 DIAGNOSIS — R79.89 ABNORMALITY OF THYROID HORMONE: ICD-10-CM

## 2025-02-14 DIAGNOSIS — R53.83 OTHER FATIGUE: Primary | ICD-10-CM

## 2025-02-14 DIAGNOSIS — M45.0 ANKYLOSING SPONDYLITIS OF MULTIPLE SITES IN SPINE (HCC): ICD-10-CM

## 2025-02-14 DIAGNOSIS — K21.9 GASTROESOPHAGEAL REFLUX DISEASE WITHOUT ESOPHAGITIS: ICD-10-CM

## 2025-02-14 PROBLEM — F32.2 SEVERE MAJOR DEPRESSIVE DISORDER (HCC): Status: RESOLVED | Noted: 2024-11-19 | Resolved: 2025-02-14

## 2025-02-14 LAB
T4 FREE SERPL-MCNC: 0.9 NG/DL (ref 0.61–1.12)
TSH SERPL DL<=0.05 MIU/L-ACNC: 1.7 UIU/ML (ref 0.45–4.5)

## 2025-02-14 PROCEDURE — 36415 COLL VENOUS BLD VENIPUNCTURE: CPT

## 2025-02-14 PROCEDURE — 84443 ASSAY THYROID STIM HORMONE: CPT

## 2025-02-14 PROCEDURE — 84439 ASSAY OF FREE THYROXINE: CPT

## 2025-02-14 PROCEDURE — 99214 OFFICE O/P EST MOD 30 MIN: CPT | Performed by: FAMILY MEDICINE

## 2025-02-14 NOTE — PATIENT INSTRUCTIONS
Stop the protonix as instructed by GI.  If you can't tolerate being off it, let GI know  Check sleep study  Obtain labs  Message rheum about the pain

## 2025-02-14 NOTE — PROGRESS NOTES
Name: Fransisco Ricardo      : 1985      MRN: [id number]  Encounter Provider: Yanira Ibrahim DO  Encounter Date: 2025   Encounter department: [facility]  :  Assessment & Plan  Other fatigue  Sleep study  Obtain labs as ordered at last appt   Orders:    Ambulatory Referral to Sleep Medicine; Future    Gastroesophageal reflux disease without esophagitis  Discussed that if he stops the protonix and has bad symptoms during the 2 weeks GI wants him off it, he should touch base with them for further instructions       Ankylosing spondylitis of multiple sites in spine (HCC)  Advised to discuss meds with rheum  Off mobic due to gerd/duodenal ulceration              History of Present Illness   HPI  Pt presents in f/u.  Had chest pain which was due to GERD.  Saw GI and EGD showed mild generalized ulcerated mucosa in the 1st and 2nd parts of the duodenum.  H.pylori was inconclusive, so pt was advised to take protonix for 8 weeks, then stop for 2 weeks, then do a stool test for h.pylori.  he asks how to do that today as he is worried that his symptoms will worsen.    Pt notes more pain in neck and stiffness overall.  He is due for f/u with rheum in may.  Wonders about changing meds    Pt notes fatigue all the time.  Can sleep for 10 hours and still be fatigued.  Not sure about snoring.   Review of Systems  See hpi; all other systems negative    Objective   /80 (BP Location: Left arm, Patient Position: Sitting, Cuff Size: Standard)   Pulse 94   Temp (!) 95.5 °F (35.3 °C) (Tympanic)   Resp 16   Ht 6' (1.829 m)   Wt 65.8 kg (145 lb)   SpO2 99%   BMI 19.67 kg/m²      Physical Exam  Constitutional:       Appearance: Normal appearance.   HENT:      Head: Normocephalic and atraumatic.      Right Ear: Tympanic membrane, ear canal and external ear normal.      Left Ear: Tympanic membrane, ear canal and external ear normal.      Nose: Nose normal. No congestion.      Mouth/Throat:      Mouth:  Mucous membranes are moist.      Pharynx: No oropharyngeal exudate or posterior oropharyngeal erythema.      Comments: Low crowded airway  Eyes:      Extraocular Movements: Extraocular movements intact.      Conjunctiva/sclera: Conjunctivae normal.      Pupils: Pupils are equal, round, and reactive to light.   Neck:      Vascular: No carotid bruit.   Cardiovascular:      Rate and Rhythm: Normal rate and regular rhythm.      Heart sounds: No murmur heard.     No friction rub. No gallop.   Pulmonary:      Effort: Pulmonary effort is normal.      Breath sounds: No wheezing, rhonchi or rales.   Abdominal:      General: Abdomen is flat. There is no distension.      Palpations: Abdomen is soft.      Tenderness: There is no abdominal tenderness.   Musculoskeletal:      Cervical back: Neck supple.   Lymphadenopathy:      Cervical: No cervical adenopathy.   Neurological:      General: No focal deficit present.      Mental Status: He is alert and oriented to person, place, and time.      Cranial Nerves: No cranial nerve deficit.      Motor: No weakness.      Deep Tendon Reflexes: Reflexes normal.

## 2025-02-17 ENCOUNTER — RESULTS FOLLOW-UP (OUTPATIENT)
Dept: FAMILY MEDICINE CLINIC | Facility: CLINIC | Age: 40
End: 2025-02-17

## 2025-02-20 ENCOUNTER — TRANSCRIBE ORDERS (OUTPATIENT)
Dept: SLEEP CENTER | Facility: CLINIC | Age: 40
End: 2025-02-20

## 2025-02-20 DIAGNOSIS — R53.83 OTHER FATIGUE: Primary | ICD-10-CM

## 2025-03-05 ENCOUNTER — APPOINTMENT (OUTPATIENT)
Dept: LAB | Facility: CLINIC | Age: 40
End: 2025-03-05
Payer: COMMERCIAL

## 2025-03-05 ENCOUNTER — RESULTS FOLLOW-UP (OUTPATIENT)
Dept: RHEUMATOLOGY | Facility: CLINIC | Age: 40
End: 2025-03-05

## 2025-03-05 ENCOUNTER — OFFICE VISIT (OUTPATIENT)
Dept: RHEUMATOLOGY | Facility: CLINIC | Age: 40
End: 2025-03-05
Payer: COMMERCIAL

## 2025-03-05 VITALS
WEIGHT: 148 LBS | HEART RATE: 96 BPM | SYSTOLIC BLOOD PRESSURE: 128 MMHG | DIASTOLIC BLOOD PRESSURE: 84 MMHG | HEIGHT: 72 IN | OXYGEN SATURATION: 98 % | BODY MASS INDEX: 20.05 KG/M2

## 2025-03-05 DIAGNOSIS — M45.0 ANKYLOSING SPONDYLITIS OF MULTIPLE SITES IN SPINE (HCC): ICD-10-CM

## 2025-03-05 DIAGNOSIS — M45.0 ANKYLOSING SPONDYLITIS OF MULTIPLE SITES IN SPINE (HCC): Primary | ICD-10-CM

## 2025-03-05 DIAGNOSIS — Z79.60 LONG-TERM USE OF IMMUNOSUPPRESSANT MEDICATION: ICD-10-CM

## 2025-03-05 DIAGNOSIS — Z11.59 SCREENING FOR VIRAL DISEASE: ICD-10-CM

## 2025-03-05 DIAGNOSIS — R79.89 ABNORMALITY OF THYROID HORMONE: ICD-10-CM

## 2025-03-05 LAB
25(OH)D3 SERPL-MCNC: 9.9 NG/ML (ref 30–100)
CRP SERPL QL: 22.4 MG/L
ERYTHROCYTE [SEDIMENTATION RATE] IN BLOOD: 42 MM/HOUR (ref 0–14)
VIT B12 SERPL-MCNC: 286 PG/ML (ref 180–914)

## 2025-03-05 PROCEDURE — 86140 C-REACTIVE PROTEIN: CPT

## 2025-03-05 PROCEDURE — 87340 HEPATITIS B SURFACE AG IA: CPT

## 2025-03-05 PROCEDURE — 86376 MICROSOMAL ANTIBODY EACH: CPT

## 2025-03-05 PROCEDURE — 86480 TB TEST CELL IMMUN MEASURE: CPT

## 2025-03-05 PROCEDURE — 86704 HEP B CORE ANTIBODY TOTAL: CPT

## 2025-03-05 PROCEDURE — 86803 HEPATITIS C AB TEST: CPT

## 2025-03-05 PROCEDURE — 99214 OFFICE O/P EST MOD 30 MIN: CPT | Performed by: STUDENT IN AN ORGANIZED HEALTH CARE EDUCATION/TRAINING PROGRAM

## 2025-03-05 PROCEDURE — 85652 RBC SED RATE AUTOMATED: CPT

## 2025-03-05 PROCEDURE — 86706 HEP B SURFACE ANTIBODY: CPT

## 2025-03-05 RX ORDER — HYDROXYCHLOROQUINE SULFATE 200 MG/1
300 TABLET, FILM COATED ORAL DAILY
Qty: 135 TABLET | Refills: 2 | Status: SHIPPED | OUTPATIENT
Start: 2025-03-05 | End: 2025-11-30

## 2025-03-05 RX ORDER — PREDNISONE 5 MG/1
TABLET ORAL
Qty: 80 TABLET | Refills: 0 | Status: SHIPPED | OUTPATIENT
Start: 2025-03-05 | End: 2025-04-05

## 2025-03-05 NOTE — PROGRESS NOTES
Name: Fransisco Ricardo      : 1985      MRN: [id number]  Encounter Provider: Cam Emerson DO  Encounter Date: 3/5/2025   Encounter department: [facility]  :  Assessment & Plan  Ankylosing spondylitis of multiple sites in spine (HCC)  It does seem like he's having worsening of his inflammatory pain since having to stop the meloxicam, but I do think that some of it is more primary msk (particularly the tenderness to palpation he has over the L ACJ)    Discussed switching him to Humira vs Cosentyx. He would prefer to try hydroxychloroquine first as it's milder. I explained that it won't help his back issues but if there is peripheral inflammatory arthritis it may help. Will try this for now, low threshold to switch his Enbrel to Humira (since he's responded well to TNIis up to this point). Ronn also an option but he is smoker so not ideal at this time    If we keep him on hydroxychloroquine then will get baseline retinal screening, did discuss risks:benefits including retinal toxicity risk  Orders:    Sedimentation rate, automated; Future    C-reactive protein; Future    hydroxychloroquine (PLAQUENIL) 200 mg tablet; Take 1.5 tablets (300 mg total) by mouth in the morning    predniSONE 5 mg tablet; Take 8 tablets (40 mg total) by mouth daily for 3 days, THEN 4 tablets (20 mg total) daily for 7 days, THEN 2 tablets (10 mg total) daily for 7 days, THEN 1 tablet (5 mg total) daily for 14 days.    Long-term use of immunosuppressant medication    Orders:    Quantiferon TB Gold Plus Assay; Future    Screening for viral disease    Orders:    Hepatitis B core antibody, total; Future    Hepatitis B surface antibody; Future    Hepatitis B surface antigen; Future    Hepatitis C antibody; Future      Patient's rheumatologic disease(s) threaten long-term function if not appropriately managed.    History of Present Illness     Admitted to the hospital for chest pain, felt to be GI in nature,  workup suspicious for H pylori. Stopped his daily meloxicam and since that his stiffness and pain has been much worse, particularly in the shoulderblade areas. Feels like a lot of muscular tightness    Enbrel feels like it's only lasting for like a day at this point    Permanent History: AS since his 20s on Enbrel for about 10 years; tried switching to Remicaide in 2020 due to concern for waning efficacy of Enbrel but developed burning sensation with each infusion so decided to switch back to Enbrel. Also on chronic NSAIDs      Objective   /84   Pulse 96   Ht 6' (1.829 m)   Wt 67.1 kg (148 lb)   SpO2 98%   BMI 20.07 kg/m²      General: Well appearing, in no distress.   Eyes: Sclera non-icteric. EOMI  Extremities: Warm, well perfused, no edema.   Neuro: Alert and oriented. No gross focal neurological deficits.   Skin: No rashes.  MSK exam: tenderness to palpation L NEISHA    Reviewed results of his EGD, stress echo, biopsy results

## 2025-03-05 NOTE — ASSESSMENT & PLAN NOTE
It does seem like he's having worsening of his inflammatory pain since having to stop the meloxicam, but I do think that some of it is more primary msk (particularly the tenderness to palpation he has over the L ACJ)    Discussed switching him to Humira vs Cosentyx. He would prefer to try hydroxychloroquine first as it's milder. I explained that it won't help his back issues but if there is peripheral inflammatory arthritis it may help. Will try this for now, low threshold to switch his Enbrel to Humira (since he's responded well to TNIis up to this point). Ronn also an option but he is smoker so not ideal at this time    If we keep him on hydroxychloroquine then will get baseline retinal screening, did discuss risks:benefits including retinal toxicity risk  Orders:    Sedimentation rate, automated; Future    C-reactive protein; Future    hydroxychloroquine (PLAQUENIL) 200 mg tablet; Take 1.5 tablets (300 mg total) by mouth in the morning    predniSONE 5 mg tablet; Take 8 tablets (40 mg total) by mouth daily for 3 days, THEN 4 tablets (20 mg total) daily for 7 days, THEN 2 tablets (10 mg total) daily for 7 days, THEN 1 tablet (5 mg total) daily for 14 days.

## 2025-03-05 NOTE — PATIENT INSTRUCTIONS
START hydroxychloroquine 1 pill a day    START prednisone taper as prescribed    CONTINUE Enbrel for now    Get hepatitis and tuberculosis blood work, as well as ESR and CRP

## 2025-03-06 LAB
GAMMA INTERFERON BACKGROUND BLD IA-ACNC: 0.01 IU/ML
HBV CORE AB SER QL: NORMAL
HBV SURFACE AB SER-ACNC: >500 MIU/ML
HBV SURFACE AG SER QL: NORMAL
HCV AB SER QL: NORMAL
M TB IFN-G BLD-IMP: NEGATIVE
M TB IFN-G CD4+ BCKGRND COR BLD-ACNC: 0 IU/ML
M TB IFN-G CD4+ BCKGRND COR BLD-ACNC: 0 IU/ML
MITOGEN IGNF BCKGRD COR BLD-ACNC: 9.99 IU/ML
THYROPEROXIDASE AB SERPL-ACNC: 10 IU/ML (ref 0–34)

## 2025-03-07 ENCOUNTER — RESULTS FOLLOW-UP (OUTPATIENT)
Dept: FAMILY MEDICINE CLINIC | Facility: CLINIC | Age: 40
End: 2025-03-07

## 2025-03-07 DIAGNOSIS — E53.8 B12 DEFICIENCY: ICD-10-CM

## 2025-03-07 DIAGNOSIS — E55.9 VITAMIN D DEFICIENCY: Primary | ICD-10-CM

## 2025-03-07 RX ORDER — ERGOCALCIFEROL 1.25 MG/1
50000 CAPSULE, LIQUID FILLED ORAL WEEKLY
Qty: 8 CAPSULE | Refills: 0 | Status: SHIPPED | OUTPATIENT
Start: 2025-03-07 | End: 2025-04-29

## 2025-04-08 ENCOUNTER — TELEPHONE (OUTPATIENT)
Dept: RHEUMATOLOGY | Facility: CLINIC | Age: 40
End: 2025-04-08

## 2025-04-08 DIAGNOSIS — M45.0 ANKYLOSING SPONDYLITIS OF MULTIPLE SITES IN SPINE (HCC): ICD-10-CM

## 2025-04-11 ENCOUNTER — TELEPHONE (OUTPATIENT)
Age: 40
End: 2025-04-11

## 2025-04-11 DIAGNOSIS — M45.0 ANKYLOSING SPONDYLITIS OF MULTIPLE SITES IN SPINE (HCC): ICD-10-CM

## 2025-04-11 NOTE — TELEPHONE ENCOUNTER
PA for enbrel SUBMITTED to [employer]    via    []CMM-KEY:    [x]Surescripts-Case ID #  [id number]  []Availity-Auth ID #  NDC #    []Faxed to plan   []Other website    []Phone call Case ID #      [x]PA sent as URGENT    All office notes, labs and other pertaining documents and studies sent. Clinical questions answered. Awaiting determination from insurance company.     Turnaround time for your insurance to make a decision on your Prior Authorization can take 7-21 business days.

## 2025-04-15 ENCOUNTER — OFFICE VISIT (OUTPATIENT)
Dept: GASTROENTEROLOGY | Facility: MEDICAL CENTER | Age: 40
End: 2025-04-15
Payer: COMMERCIAL

## 2025-04-15 VITALS
HEIGHT: 72 IN | BODY MASS INDEX: 19.56 KG/M2 | DIASTOLIC BLOOD PRESSURE: 67 MMHG | TEMPERATURE: 97 F | OXYGEN SATURATION: 99 % | HEART RATE: 73 BPM | SYSTOLIC BLOOD PRESSURE: 124 MMHG | WEIGHT: 144.4 LBS

## 2025-04-15 DIAGNOSIS — R07.89 ATYPICAL CHEST PAIN: ICD-10-CM

## 2025-04-15 DIAGNOSIS — M45.0 ANKYLOSING SPONDYLITIS OF MULTIPLE SITES IN SPINE (HCC): ICD-10-CM

## 2025-04-15 DIAGNOSIS — K21.9 GASTROESOPHAGEAL REFLUX DISEASE WITHOUT ESOPHAGITIS: Primary | ICD-10-CM

## 2025-04-15 DIAGNOSIS — K29.70 GASTRITIS WITHOUT BLEEDING, UNSPECIFIED CHRONICITY, UNSPECIFIED GASTRITIS TYPE: ICD-10-CM

## 2025-04-15 PROCEDURE — 99214 OFFICE O/P EST MOD 30 MIN: CPT | Performed by: INTERNAL MEDICINE

## 2025-04-15 NOTE — TELEPHONE ENCOUNTER
PA for (ENBREL SURECLICK) 50 MG/ML injection APPROVED     Date(s) approved 03/12/25-04/11/26    Case # [id number]    Patient advised by          []Who@hart Message  [x]Phone call - spoke with patient   []LMOM  []L/M to call office as no active Communication consent on file  []Unable to leave detailed message as VM not approved on Communication consent       Pharmacy advised by    []Fax  []Phone call  []Secure Chat    Specialty Pharmacy    [x][facility]     Approval letter scanned into [employer] , not available at time of approval

## 2025-04-15 NOTE — PROGRESS NOTES
Name: Fransisco Ricardo      : 1985      MRN: [id number]  Encounter Provider: Diana M Jaiyeola, MD  Encounter Date: 4/15/2025   Encounter department: [facility] [place]  :  Assessment & Plan  Gastroesophageal reflux disease without esophagitis  He reported symptoms of atypical chest discomfort.  He underwent EGD showing ulcerated mucosa of the duodenal bulb and second portion of the duodenum.  We discussed that these findings are likely related to his history of NSAID use.  Gastric biopsies' stain was negative for H. pylori but there were findings suspicious for H. pylori infection.  He was unable to stop the pantoprazole due to ongoing abdominal pain symptoms in order to submit the stool antigen testing  I recommend trialing 2 discontinue Protonix for 2 weeks to submit H. pylori stool testing given that his symptoms are improved now.  He can take famotidine up to 40 mg twice daily during the 2 weeks to control his symptoms.  If he continues to have abdominal pain symptoms despite discontinuing NSAIDs and if H. pylori testing is negative, we discussed potentially performing a colonoscopy in the future to rule out inflammatory bowel disease given his post bulbar duodenal ulceration and history of ankylosing spondylitis.       Atypical chest pain         Gastritis without bleeding, unspecified chronicity, unspecified gastritis type         Ankylosing spondylitis of multiple sites in spine (HCC)             History of Present Illness   Fransisco Ricardo is a 39 y.o. male who presents for follow-up evaluation.    He was seen in the GI office in 2024 for heartburn/reflux/chest pain symptoms.  At that time he was taking pantoprazole 40 mg twice daily recommended schedule endoscopy.    Interval history: He underwent EGD 2023 showing a small hiatal hernia ulcerated mucosa in the duodenal bulb and second portion of the duodenum.  Gastric biopsy showed active gastritis, H. pylori  stain was negative.  There was no H. pylori on staining however features were suspicious for H. pylori infection and he was recommended to undergo fecal antigen testing.  Otherwise his esophageal biopsies were normal.    He was taking Protonix twice daily and reported worsening of his abdominal pain.  He is now taking the medication once daily.  He tried to stop Protonix to submit the H. pylori stool testing but had worsening of abdominal pain and resume take the medication.  He is following with rheumatology for history of ankylosing spondylitis and joint symptoms.  He is currently taking Plaquenil instead of meloxicam.  He reports regular, formed bowel movements without melena or hematochezia    He has no prior colonoscopy        HPI  History obtained from: patient  Review of Systems   Constitutional:  Negative for chills and fever.   HENT:  Negative for ear pain and sore throat.    Eyes:  Negative for pain and visual disturbance.   Respiratory:  Negative for cough and shortness of breath.    Cardiovascular:  Negative for chest pain and palpitations.   Gastrointestinal:  Negative for abdominal pain and vomiting.   Genitourinary:  Negative for dysuria and hematuria.   Musculoskeletal:  Positive for arthralgias and myalgias. Negative for back pain.   Skin:  Negative for color change and rash.   Neurological:  Negative for seizures and syncope.   All other systems reviewed and are negative.   A complete review of systems is negative other than that noted above in the HPI.    Past Medical History   Past Medical History:   Diagnosis Date    Ankylosing spondylitis (HCC)     Arthritis     AS (ankylosing spondylitis) ([facility]) 07/21/2017    Encounter for monitoring of etanercept therapy 01/07/2019    GERD (gastroesophageal reflux disease)     Kidney stone      Past Surgical History:   Procedure Laterality Date    NM ARTHRS HIP DEBRIDEMENT/SHAVING ARTICULAR CRTLG Left 6/1/2017    Procedure: LEFT HIP ARTHROSCOPIC LABRAL  DEBRIDEMENT;  Surgeon: Oscar Ferrara MD;  Location: [facility];  Service: Orthopedics    WISDOM TOOTH EXTRACTION       Family History   Problem Relation Age of Onset    Colon polyps Father     No Known Problems Maternal Grandmother     No Known Problems Maternal Grandfather     No Known Problems Paternal Grandmother     Skin cancer Paternal Grandfather       reports that he has been smoking cigarettes. He has a 7.5 pack-year smoking history. He has never used smokeless tobacco. He reports that he does not currently use drugs. He reports that he does not drink alcohol.  Current Outpatient Medications   Medication Instructions    calcium carbonate (TUMS) 500 mg chewable tablet 1 tablet, Daily    ergocalciferol (VITAMIN D2) 50,000 Units, Oral, Weekly    etanercept (ENBREL SURECLICK) 50 mg, Subcutaneous, Every 7 days    famotidine (PEPCID) 20 mg, Oral, Daily    hydroxychloroquine (PLAQUENIL) 300 mg, Oral, Daily    methocarbamol (ROBAXIN) 500 mg, Oral, Daily at bedtime PRN    pantoprazole (PROTONIX) 40 mg, Oral, Daily before breakfast    traMADol (ULTRAM) 50 mg, Oral, Daily, Take 1 tablet daily prn pain     Allergies   Allergen Reactions    Other Other (See Comments)     Raw fruits/veggies.       Current Outpatient Medications   Medication Sig Dispense Refill    calcium carbonate (TUMS) 500 mg chewable tablet Chew 1 tablet daily      ergocalciferol (VITAMIN D2) 50,000 units Take 1 capsule (50,000 Units total) by mouth once a week 8 capsule 0    etanercept (ENBREL SURECLICK) 50 MG/ML injection Inject 1 mL (50 mg total) under the skin every 7 days 12 mL 3    famotidine (PEPCID) 20 mg tablet take 1 tablet by mouth once daily (Patient taking differently: Take 20 mg by mouth daily at bedtime) 90 tablet 1    hydroxychloroquine (PLAQUENIL) 200 mg tablet Take 1.5 tablets (300 mg total) by mouth in the morning 135 tablet 2    methocarbamol (ROBAXIN) 500 mg tablet Take 1 tablet (500 mg total) by mouth daily at bedtime as needed  for muscle spasms 30 tablet 5    pantoprazole (PROTONIX) 40 mg tablet Take 1 tablet (40 mg total) by mouth daily before breakfast 60 tablet 1    traMADol (ULTRAM) 50 mg tablet Take 1 tablet (50 mg total) by mouth in the morning Take 1 tablet daily prn pain 30 tablet 1     No current facility-administered medications for this visit.     Objective   /67 (BP Location: Left arm, Patient Position: Sitting, Cuff Size: Standard)   Pulse 73   Temp (!) 97 °F (36.1 °C) (Tympanic)   Ht 6' (1.829 m)   Wt 65.5 kg (144 lb 6.4 oz)   SpO2 99%   BMI 19.58 kg/m²     Physical Exam  Vitals and nursing note reviewed.   Constitutional:       General: He is not in acute distress.     Appearance: He is well-developed.   HENT:      Head: Normocephalic and atraumatic.   Eyes:      Conjunctiva/sclera: Conjunctivae normal.   Cardiovascular:      Rate and Rhythm: Normal rate and regular rhythm.      Heart sounds: No murmur heard.  Pulmonary:      Effort: Pulmonary effort is normal. No respiratory distress.      Breath sounds: Normal breath sounds.   Abdominal:      Palpations: Abdomen is soft.      Tenderness: There is no abdominal tenderness.   Musculoskeletal:         General: No swelling.      Cervical back: Neck supple.   Skin:     General: Skin is warm and dry.      Capillary Refill: Capillary refill takes less than 2 seconds.   Neurological:      Mental Status: He is alert.   Psychiatric:         Mood and Affect: Mood normal.            Lab Results: I personally reviewed relevant lab results.       Results for orders placed during the hospital encounter of 12/23/24    EGD    Impression  2 cm hiatal hernia  Performed forceps biopsies in the esophagus to rule out eosinophilic esophagitis  The stomach appeared normal. Performed random biopsy to rule out H. pylori.  The duodenum appeared normal.  Mild ulcerated mucosa in the 1st part of the duodenum and 2nd part of the duodenum      RECOMMENDATION:  Await pathology results  Follow  up with GI Clinic  Resume daily PPI for additional 6 to 8 weeks  Avoid NSAIDs            Diana M Jaiyeola, MD

## 2025-04-22 DIAGNOSIS — R07.89 OTHER CHEST PAIN: ICD-10-CM

## 2025-04-22 RX ORDER — PANTOPRAZOLE SODIUM 40 MG/1
TABLET, DELAYED RELEASE ORAL
Qty: 60 TABLET | Refills: 4 | Status: SHIPPED | OUTPATIENT
Start: 2025-04-22

## 2025-04-28 DIAGNOSIS — E55.9 VITAMIN D DEFICIENCY: ICD-10-CM

## 2025-04-29 RX ORDER — ERGOCALCIFEROL 1.25 MG/1
50000 CAPSULE, LIQUID FILLED ORAL WEEKLY
Qty: 8 CAPSULE | Refills: 0 | Status: SHIPPED | OUTPATIENT
Start: 2025-04-29

## 2025-05-09 ENCOUNTER — OFFICE VISIT (OUTPATIENT)
Dept: PAIN MEDICINE | Facility: CLINIC | Age: 40
End: 2025-05-09
Payer: COMMERCIAL

## 2025-05-09 DIAGNOSIS — M54.50 CHRONIC BILATERAL LOW BACK PAIN WITHOUT SCIATICA: ICD-10-CM

## 2025-05-09 DIAGNOSIS — G89.29 CHRONIC BILATERAL LOW BACK PAIN WITHOUT SCIATICA: ICD-10-CM

## 2025-05-09 DIAGNOSIS — G89.4 CHRONIC PAIN SYNDROME: ICD-10-CM

## 2025-05-09 DIAGNOSIS — M79.18 MYOFASCIAL PAIN SYNDROME: ICD-10-CM

## 2025-05-09 DIAGNOSIS — M45.0 ANKYLOSING SPONDYLITIS OF MULTIPLE SITES IN SPINE (HCC): Primary | ICD-10-CM

## 2025-05-09 PROCEDURE — 99214 OFFICE O/P EST MOD 30 MIN: CPT

## 2025-05-09 RX ORDER — TRAMADOL HYDROCHLORIDE 50 MG/1
50 TABLET ORAL DAILY
Qty: 30 TABLET | Refills: 1 | Status: SHIPPED | OUTPATIENT
Start: 2025-05-09

## 2025-05-09 NOTE — PATIENT INSTRUCTIONS
"Patient Education     Taking opioids safely   The Basics   Written by the doctors and editors at [facility]   What are opioids? -- Opioids are a group of prescription medicines that relieve pain. They work by attaching to \"opioid receptors\" in the body and blocking pain signals.  Opioids are sometimes used when other types of pain medicine do not help enough. Opioids can be helpful for treating short-term, or \"acute,\" pain, like after surgery or an injury. They are also sometimes used to treat long-term, or \"chronic,\" pain, like for people with cancer. But they come with risks.  If your doctor prescribes an opioid medicine, it's important to understand the risks and know how to stay safe.  What are the risks of taking opioids? -- You should know that:   Opioids have side effects. Some are just bothersome, and some can be dangerous. For example, taking too much of an opioid is called an \"overdose.\" An overdose can cause serious problems and even death.   In some cases, taking opioids can lead to misuse. For example, people might take the medicine when they don't need it for pain. Or they might take more than they are supposed to. Sharing or selling opioids are other examples of misuse.   There is a risk of addiction. This is also called \"opioid use disorder.\"  If you take too much, or take opioids with alcohol or certain other drugs, it can cause serious harm. It can even cause death from overdose.  How do I stay safe? -- There are things you can do to stay safe if you need to take an opioid medicine (figure 1). These things help protect yourself and others.  Know your medicines:    Opioids come in different forms. \"Immediate-release\" medicines work quickly and last for a short time. \"Extended-release\" medicines work more slowly and last longer. Make sure that you know what type of opioid you have. Read the label and the information that comes with your prescription.   Follow your treatment plan carefully. Take only " "the dose your doctor prescribes, and only as often as they tell you to.   Never take opioids that were not prescribed to you.   Some opioids come combined with other medicines like acetaminophen or an \"NSAID\" (like ibuprofen). Do not take any extra NSAIDs or acetaminophen without talking to your doctor first.   Make sure that all of your doctors know every medicine you take, even those that are non-prescription. Some medicines can affect the way opioids work. Bring a complete list of all of your pain medicines and other medicines with you whenever you go to a doctor, nurse, dentist, or pharmacist.   Ask your doctor or pharmacist if it is safe to take your other medicines with your opioid medicine.  Use and store your medicine safely:    Do not drink alcohol while you are taking opioids.   Do not take opioids with medicines that make you sleepy, unless your doctor tells you to. Examples include:   \"Benzodiazepines\" like diazepam (sample brand name: Valium) or alprazolam (sample brand name: Xanax)   Gabapentin (sample brand name: Neurontin) or pregabalin (brand name: Lyrica)   Muscle relaxants like baclofen or cyclobenzaprine   Sleeping pills like zolpidem (sample brand name: Ambien)   Talk to your doctor about whether it is safe to drive. Opioids can make you feel tired or have trouble thinking clearly. If you are starting a new prescription or taking a higher dose, you might need to avoid things like driving, using dangerous machinery, or other activities that could be risky.   Store your opioids in a safe place, such as a locked cabinet. This prevents children, teens, or anyone else from getting to them.   Never share your opioids with other people.  Be aware of side effects:    Opioid medicines can cause side effects. There are often ways to prevent or treat these.   Call your doctor or nurse if you have side effects that bother you, such as:   Constipation - Your doctor or nurse might suggest that you take a " "laxative to prevent or treat constipation. If your bowel movements are hard and dry, a stool softener might help. Drink plenty of water, and try to get regular physical activity.   Mild nausea or stomach discomfort - Taking the medicine with or after food can help with this. Nausea usually gets better with time.   Severe nausea, vomiting, or itchiness - If you have any of these problems, your doctor might be able to switch you to a different medicine.   Dry mouth   Feeling dizzy or sleepy, or having trouble thinking clearly   Vision problems   Being clumsy or falling down   Know the signs of an opioid overdose. Get help right away if you think that you or someone else took too much of an opioid medicine. Signs of an overdose are listed below.  Stay safe when stopping your opioid medicine:    When opioids are needed to treat acute pain, doctors usually try to prescribe them for only a short time. This usually means a few days or a week. They also prescribe the lowest dose possible to relieve pain.   Follow your doctor's instructions about how to stop taking your opioid once your pain has improved. This usually involves \"tapering,\" or reducing the dose gradually. If you stop an opioid suddenly, this can cause unpleasant symptoms like stomach ache, diarrhea, or shakes. This is called \"withdrawal.\" Tapering the dose can help prevent withdrawal.   When your pain gets better, get rid of any leftover medicines. Your doctor, nurse, or pharmacist can suggest ways to get rid of them. This might involve flushing them down the toilet or mixing them with something like dirt or cat litter, then putting the mixture in a sealed container in the trash. Some police stations and pharmacies also take leftover medicines.  What is naloxone? -- Naloxone is a medicine that reverses the effects of opioids. It can prevent death from an opioid overdose. Naloxone comes as an injection (shot), or as a spray that goes in the nose. Naloxone nasal " spray (brand name: Narcan) is available without a prescription.  If you or someone you know uses opioids, it's a good idea to keep naloxone with you. Make sure that you and your family and friends know how and when to use it.  When should I call for help? -- If you are taking an opioid, it's important to know when to get help. Signs of an opioid overdose include:   Extreme sleepiness   Slow breathing, or no breathing at all   Very small pupils (the black circles in the center of the eyes)   Very slow heartbeat  If you took too much of your opioid medicine or think that someone is having an opioid overdose:   If you have naloxone, give it immediately. Naloxone can save a person's life. But it needs to be given as soon as possible.   Call for an ambulance right away (in the [place] and [place], call 9-1-1).  Call your doctor or nurse if:   You are having side effects that bother you.   You have questions about how to take your medicine.   You are having trouble managing your pain.  All topics are updated as new evidence becomes available and our peer review process is complete.  This topic retrieved from [employer] on: May 15, 2024.  Topic 743987 Version 1.0  Release: 32.4.3 - C32.134  © 2024 UpToDate, Inc. and/or its affiliates. All rights reserved.  figure 1: Tips for medication safety     Tips to use your medicine safely include:  (A) Read labels so you know how to take your medicines.  (B) Have a routine for taking your medicines.  (C) Make sure you know what foods, drinks, and other medicines are safe for you.  (D) Store medicines in a safe place.  (E) Work with your health care team and ask questions if you have them.  Graphic 439617 Version 2.0  Consumer Information Use and Disclaimer   Disclaimer: This generalized information is a limited summary of diagnosis, treatment, and/or medication information. It is not meant to be comprehensive and should be used as a tool to help the user understand and/or assess potential  diagnostic and treatment options. It does NOT include all information about conditions, treatments, medications, side effects, or risks that may apply to a specific patient. It is not intended to be medical advice or a substitute for the medical advice, diagnosis, or treatment of a health care provider based on the health care provider's examination and assessment of a patient's specific and unique circumstances. Patients must speak with a health care provider for complete information about their health, medical questions, and treatment options, including any risks or benefits regarding use of medications. This information does not endorse any treatments or medications as safe, effective, or approved for treating a specific patient. UpToDate, Inc. and its affiliates disclaim any warranty or liability relating to this information or the use thereof.The use of this information is governed by the Terms of Use, available at https://www.[facility].com/en/know/clinical-effectiveness-terms. 2024© UpToDate, Inc. and its affiliates and/or licensors. All rights reserved.  Copyright   © 2024 Up[employer]Date, Inc. and/or its affiliates. All rights reserved.

## 2025-05-09 NOTE — PROGRESS NOTES
Name: Fransisco Ricardo    : 1985      MRN: [id number]  Encounter Provider: MARIO Beck  Encounter Date: 2025  Encounter department: [facility] [place]    Assessment:  1. Ankylosing spondylitis of multiple sites in spine (HCC)  -     traMADol (ULTRAM) 50 mg tablet; Take 1 tablet (50 mg total) by mouth in the morning Take 1 tablet daily prn pain  -     Ambulatory referral to Physical Therapy; Future  2. Chronic pain syndrome  3. Chronic bilateral low back pain without sciatica  -     traMADol (ULTRAM) 50 mg tablet; Take 1 tablet (50 mg total) by mouth in the morning Take 1 tablet daily prn pain  4. Myofascial pain syndrome  -     Ambulatory referral to Physical Therapy; Future      Plan:  The patient is a 39-year-old male with a history of chronic pain secondary to low back pain, lumbar intervertebral disc disorder with radiculopathy and ankylosing spondylitis who presents to the office with ongoing mid and bilateral low back pain that is unchanged since his last office visit.    Overall his pain continues to be managed with taking tramadol, therefore I will continue him on this medication as prescribed. A 1 month prescription for tramadol 50 mg with 1 refill was electronically sent to the patient's pharmacy with a do not fill date of today, 2025.    I will also place an order for him to do physical therapy, as physical therapy has been helpful for him in the past.    There are risks associated with opioid medications, including dependence, addiction and tolerance. The patient understands and agrees to use these medications only as prescribed. Potential side effects of the medications include, but are not limited to, constipation, drowsiness, addiction, impaired judgment and risk of fatal overdose if not taken as prescribed. The patient was warned against driving while taking sedation medications.  Sharing medications is a felony. At this point in time, the patient is showing  no signs of addiction, abuse, diversion or suicidal ideation.    [place] Prescription Drug Monitoring Program report was reviewed and was appropriate     My impressions and treatment recommendations were discussed in detail with the patient who verbalized understanding and had no further questions.  Discharge instructions were provided. I personally saw and examined the patient and I agree with the above discussed plan of care.    Follow-up is planned  16 weeks  or sooner as warranted.  Discharge instructions were provided.     Orders Placed This Encounter   Procedures    Ambulatory referral to Physical Therapy     Standing Status:   Future     Expiration Date:   5/9/2026     Referral Priority:   Routine     Referral Type:   Physical Therapy     Referral Reason:   Specialty Services Required     Requested Specialty:   Physical Therapy     Number of Visits Requested:   1     Expiration Date:   5/9/2026     New Medications Ordered This Visit   Medications    traMADol (ULTRAM) 50 mg tablet     Sig: Take 1 tablet (50 mg total) by mouth in the morning Take 1 tablet daily prn pain     Dispense:  30 tablet     Refill:  1       History of Present Illness:  Fransisco Ricardo is a 39 y.o. male with a history of chronic pain secondary to low back pain, lumbar intervertebral disc disorder with radiculopathy and ankylosing spondylitis.  He was last seen on 1/16/2025 where he was continued on tramadol 50 mg.  He presents to the office with on going mid and bilateral low back pain.    He states his pain is the same since the last office visit and constant.  He rates the quality of his pain as sharp and is currently rating his pain a 6/10 on a numeric scale.    Current pain medications include tramadol 50 mg, 0.5 tablets daily which is providing him moderate relief of his pain without side effects.  He is currently followed by rheumatology who prescribes him methocarbamol and hydroxychloroquine.    Opioid contract date:  1/16/25  Last drug screen date: 1/16/25  Last dose taken on: 5/9/25                 I have personally reviewed and/or updated the patient's past medical history, past surgical history, family history, social history, current medications, allergies, and vital signs today.     Review of Systems:    Review of Systems   Respiratory:  Negative for shortness of breath.    Cardiovascular:  Negative for chest pain.   Gastrointestinal:  Negative for constipation, diarrhea, nausea and vomiting.   Musculoskeletal:  Positive for back pain and gait problem. Negative for arthralgias, joint swelling and myalgias.   Skin:  Negative for rash.   Neurological:  Negative for dizziness, seizures and weakness.   All other systems reviewed and are negative.      Patient Active Problem List   Diagnosis    AS (ankylosing spondylitis) ([facility])    Primary localized osteoarthritis of left hip    NSAID long-term use    Ankylosing spondylitis of multiple sites in spine ([facility])    High risk medication use    Long-term use of immunosuppressant medication    Anal pain    Rectal bleeding    Anal fissure    Family history of hyperplastic colon polyps    Other chest pain    Abnormal EKG    Smokes tobacco daily    Jaw pain       Past Medical History:   Diagnosis Date    Ankylosing spondylitis ([facility])     Arthritis     AS (ankylosing spondylitis) ([facility]) 07/21/2017    Encounter for monitoring of etanercept therapy 01/07/2019    GERD (gastroesophageal reflux disease)     Kidney stone        Past Surgical History:   Procedure Laterality Date    MS ARTHRS HIP DEBRIDEMENT/SHAVING ARTICULAR CRTLG Left 6/1/2017    Procedure: LEFT HIP ARTHROSCOPIC LABRAL DEBRIDEMENT;  Surgeon: Oscar Ferrara MD;  Location: [facility];  Service: Orthopedics    WISDOM TOOTH EXTRACTION         Family History   Problem Relation Age of Onset    Colon polyps Father     No Known Problems Maternal Grandmother     No Known Problems Maternal Grandfather     No Known Problems Paternal Grandmother      Skin cancer Paternal Grandfather        Social History     Occupational History    Not on file   Tobacco Use    Smoking status: Every Day     Current packs/day: 0.50     Average packs/day: 0.5 packs/day for 15.0 years (7.5 ttl pk-yrs)     Types: Cigarettes    Smokeless tobacco: Never   Vaping Use    Vaping status: Never Used   Substance and Sexual Activity    Alcohol use: No    Drug use: Not Currently    Sexual activity: Yes     Partners: Female     Birth control/protection: Condom Male       Current Outpatient Medications on File Prior to Visit   Medication Sig    calcium carbonate (TUMS) 500 mg chewable tablet Chew 1 tablet daily    ergocalciferol (VITAMIN D2) 50,000 units take 1 capsule by mouth every week    etanercept (ENBREL SURECLICK) 50 MG/ML injection Inject 1 mL (50 mg total) under the skin every 7 days    famotidine (PEPCID) 20 mg tablet take 1 tablet by mouth once daily (Patient taking differently: Take 20 mg by mouth daily at bedtime)    hydroxychloroquine (PLAQUENIL) 200 mg tablet Take 1.5 tablets (300 mg total) by mouth in the morning    methocarbamol (ROBAXIN) 500 mg tablet Take 1 tablet (500 mg total) by mouth daily at bedtime as needed for muscle spasms    pantoprazole (PROTONIX) 40 mg tablet take 1 tablet by mouth once daily before breakfast    [DISCONTINUED] traMADol (ULTRAM) 50 mg tablet Take 1 tablet (50 mg total) by mouth in the morning Take 1 tablet daily prn pain     No current facility-administered medications on file prior to visit.       Allergies   Allergen Reactions    Other Other (See Comments)     Raw fruits/veggies.        Physical Exam:    There were no vitals taken for this visit.    Constitutional:normal, well developed, well nourished, alert, in no distress and non-toxic and no overt pain behavior.  Eyes:anicteric  HEENT:grossly intact  Neck:supple, symmetric, trachea midline and no masses   Pulmonary:even and unlabored  Cardiovascular:No edema or pitting edema  present  Skin:Normal without rashes or lesions and well hydrated  Psychiatric:Mood and affect appropriate  Neurologic:Cranial Nerves II-XII grossly intact  Musculoskeletal: normal gait      This document was created using speech voice recognition software. Grammatical errors, random word insertions, pronoun errors, and incomplete sentences are an occasional consequence of this system due to software limitations, ambient noise, and hardware issues. Any formal questions or concerns about content, text, or information contained within the body of this dictation should be directly addressed to the provider for clarification.

## 2025-05-21 ENCOUNTER — EVALUATION (OUTPATIENT)
Dept: PHYSICAL THERAPY | Facility: CLINIC | Age: 40
End: 2025-05-21
Payer: COMMERCIAL

## 2025-05-21 DIAGNOSIS — G89.29 CHRONIC BILATERAL LOW BACK PAIN WITHOUT SCIATICA: ICD-10-CM

## 2025-05-21 DIAGNOSIS — M54.50 CHRONIC BILATERAL LOW BACK PAIN WITHOUT SCIATICA: ICD-10-CM

## 2025-05-21 DIAGNOSIS — M45.0 ANKYLOSING SPONDYLITIS OF MULTIPLE SITES IN SPINE (HCC): Primary | ICD-10-CM

## 2025-05-21 DIAGNOSIS — M53.86 DECREASED ROM OF LUMBAR SPINE: ICD-10-CM

## 2025-05-21 PROCEDURE — 97110 THERAPEUTIC EXERCISES: CPT

## 2025-05-21 PROCEDURE — 97162 PT EVAL MOD COMPLEX 30 MIN: CPT

## 2025-05-21 NOTE — PROGRESS NOTES
PT EVALUATION    Today's date: 25  Patient name: Fransisco Ricardo  : 1985  MRN: [id number]  Referring provider: Meka Garcia CRNP  Dx:   1. Ankylosing spondylitis of multiple sites in spine (HCC)    2. Decreased ROM of lumbar spine    3. Chronic bilateral low back pain without sciatica        ASSESSMENT:  Fransisco Ricardo is a 39 y.o. male who presents with signs and symptoms consistent with referring diagnosis. Patient presents with pain, decreased strength, decreased ROM, decreased joint mobility, and postural dysfunction.  Due to these impairments, patient has difficulty performing a/iadls, recreational activities, and work-related activities. Upon examination, patient was TTP at the lumbar paraspinals and lumbar spinous process around T11-L3. Patient demonstrates significant lumbar and thoracic ROM limitations in all directions except flexion. Patient also has hip flexor tightness evidenced by positive judy test. Some mild strength deficits of the LE present. Therefore, patient would benefit from skilled physical therapy to address the impairments, improve their level of function, and to improve their overall quality of life. Patient was provided education regarding diagnosis, POC, and was provided an HEP at the end of session. No further referral appears necessary at this time based upon examination findings.      Impairments:    restricted ROM    decreased strength   pain with function   activity intolerance   Postural dysfunction     Prognosis:  Fair  Positive and negative prognostic indicator(s):  negative prognostic factors include pain > 3 months, progressive disease, and high copay    Goals:    STG (to be met in 4 weeks)  Patient will be independent with basic HEP for self-management.  Patient will improve lumbar ROM by 5-10 degrees in all deficient planes.  Patient will improve cervical ROM by -10 in all deficient planes.  Patient will improve LE strength by 1/2 MMT grade in all deficient  planes.   Patient will report decreased pain by 25% at its worst.     LTG (to be met by discharge):  Patient will be independent with comprehensive HEP for maintenance after discharge.   Patient will improve FOTO score to equal to or above predicted value.   Patient will improve sitting tolerance to >30 minutes.  Patient will improve standing tolerance to >/= 1 hour to assist with adl/iadls.   Patient will demonstrate improved hip ROM/flexibility to at least 50% assist with adl/iadls.  Patient will improve chest expansion to at least 2 cm for improved respiratory function.       Planned interventions:  home exercise program, patient education, manual therapy, massage, graded activity, flexibility, functional range of motion exercises, strengthening, abdominal trunk stabilization, balance and weight bearing training, gait training, McConnel taping, low level laser with IASTM, and modalities prn    Duration in visits:  8  Frequency: 1 visits per week  Duration in weeks:  8  POC expiration: 7/16/202    History of Current Injury: Patient presents to OPPT with primary chief complaint of low back pain secondary to ankylosing spondylitis. Patient also reports an increase in neck pain and stiffness recently. Patient has been dealing with low back pain for a while not and is being followed by pain management and rheumatology. States he was taken off of meloxicam and has not taken been taking any anti-inflammatory as of recently. Therefore, he has increased stiffness. 24 hour pattern include: patient states he is good in the morning but finds that he is more bent over as the day progresses. He does do exercises on his own including stretches and strengthening exercises for the LE. States he feel like his abdominal muscles are strong but back muscles are week and believes he has an imbalance. States sitting is worse than standing. He does use heat constantly to help with tightness.     Pain location: mid-low back  Pain  descriptors: Dull Ache, Tight  Pain at Currently: 5/10  Pain at Worst:  8/10      Aggravating factors: sitting (tolerance <5 minutes); prolonged standing;   Easing factors: heat, medication    Imaging: none taken recently  Special Questions: denies numbness and tingling      Hobbies/Interests: hunt, hiking  Occupation:    Patient goals: Patient reports goals for physical therapy would be decreased pain and increased mobility        Objective     Static Posture     Head  Forward.    Shoulders  Rounded.    Lumbar Spine   Decreased lordosis.     Postural Observations  Seated posture: poor  Standing posture: poor      Palpation     Additional Palpation Details  (+) tenderness at lumbar paraspinals    Tenderness     Lumbar Spine  Tenderness in the spinous process.     Neurological Testing     Sensation   Cervical/Thoracic   Left   Intact: light touch    Right   Intact: light touch    Lumbar   Left   Intact: light touch    Right   Intact: light touch    Active Range of Motion   Cervical/Thoracic Spine       Cervical    Flexion: 35 degrees   Extension: 50 degrees      Left lateral flexion: 15 degrees      Right lateral flexion: 15 degrees      Left rotation: 45 degrees  Right rotation: 50 degrees       Thoracic    Flexion:  Restriction level: moderate  Extension:  Restriction level: maximal  Left rotation:  Restriction level: moderate  Right rotation:  Restriction level: moderate    Lumbar   Flexion: 80 degrees   Extension: 5 degrees   Left lateral flexion: Active left lumbar lateral flexion: 22 inches from floor; compensates with anterior trunk lean secondary to forward posture.    Restriction level: moderate  Right lateral flexion: Active right lumbar lateral flexion: 20 inches from floor; compensates with anterior trunk lean secondary to foward posture.  Restriction level: moderate  Left rotation:  Restriction level: maximal  Right rotation:  Restriction level: maximal  Left Hip   Internal rotation (90/90): 30  degrees     Right Hip   Internal rotation (90/90): 30 degrees     Additional Active Range of Motion Details  Decreased thoracic and lumbar segmental mobility    Joint Play     Hypomobile: T11, T12, L1, L2, L3, L4 and L5     Strength/Myotome Testing     Left Hip   Planes of Motion   Flexion: 4  Extension: 4+ (standing)  Abduction: 4  External rotation: 5    Right Hip   Planes of Motion   Flexion: 4  Extension: 4+ (standing)  Abduction: 4+  External rotation: 5    Left Knee   Flexion: 5  Extension: 5    Right Knee   Flexion: 5  Extension: 5    Left Ankle/Foot   Dorsiflexion: 5  Plantar flexion: 5    Right Ankle/Foot   Dorsiflexion: 5  Plantar flexion: 5    Tests     Left Hip   Theo: Positive.     Right Hip   Theo: Positive.     Ambulation     Quality of Movement During Gait   Trunk  Forward lean.     General Comments:      Hip Comments   Hamstring:   RLE: 74  LLE: 80 degres    Theo Test:  LLE: 5 in from table  RLE: 5.5 in from table      Cervical/Thoracic Comments  Chest expansion: 0.5 cm            Precautions: OA L hip, ankylosing spondylitis      Diagnosis:    Precautions:    POC expiration date:   [employer] Access Code:   *asterisks by exercise = given for HEP   Visit Count        Manuals        Lumbar STM prn        Laser                                There Ex        UBE/Nustep        Standing hip flexor stretch HEP       Standing piriformis stretch HEP       Lateral trunk flexion HEP       Doorway pec stretch HEP       Trunk sidebends        UT/LS stretch                                Neuro Re-Ed        Bird dogs Modified HEP       Wall angels HEP       Wall sits        Paloff press  BTB HEP       Stir the pot        Chin tucks        Deep breathing        Scap retraction        Standing belly press w/PB        Plank        Re-evaluation              Ther Act                                                         Modalities

## 2025-05-22 ENCOUNTER — TELEPHONE (OUTPATIENT)
Dept: RHEUMATOLOGY | Facility: CLINIC | Age: 40
End: 2025-05-22

## 2025-05-22 ENCOUNTER — OFFICE VISIT (OUTPATIENT)
Dept: RHEUMATOLOGY | Facility: CLINIC | Age: 40
End: 2025-05-22

## 2025-05-22 VITALS
SYSTOLIC BLOOD PRESSURE: 108 MMHG | BODY MASS INDEX: 18.31 KG/M2 | HEIGHT: 72 IN | OXYGEN SATURATION: 96 % | TEMPERATURE: 97.5 F | DIASTOLIC BLOOD PRESSURE: 58 MMHG | HEART RATE: 86 BPM | WEIGHT: 135.2 LBS

## 2025-05-22 DIAGNOSIS — M45.0 ANKYLOSING SPONDYLITIS OF MULTIPLE SITES IN SPINE (HCC): Primary | ICD-10-CM

## 2025-05-22 DIAGNOSIS — M45.0 ANKYLOSING SPONDYLITIS OF MULTIPLE SITES IN SPINE (HCC): ICD-10-CM

## 2025-05-22 RX ORDER — ADALIMUMAB 40MG/0.4ML
1 KIT SUBCUTANEOUS
Qty: 2 EACH | Refills: 5 | Status: SHIPPED | OUTPATIENT
Start: 2025-05-22

## 2025-05-22 RX ORDER — HYDROXYCHLOROQUINE SULFATE 200 MG/1
300 TABLET, FILM COATED ORAL DAILY
Qty: 45 TABLET | Refills: 0 | Status: SHIPPED | OUTPATIENT
Start: 2025-05-22 | End: 2025-06-21

## 2025-05-22 NOTE — PATIENT INSTRUCTIONS
Continue hydroxychloroquine for another month, then stop    Continue Enbrel for now; I will submit authorization for adalimumab (Humira or a biosimilar)    Tumor Necrosis Factor (TNF) Inhibitors    Tumor necrosis factor (TNF) is a protein in your body that causes inflammation. TNF inhibitors are drugs that help stop inflammation and are used to treat inflammatory conditions such as rheumatoid arthritis (RA), psoriatic arthritis, juvenile idiopathic arthritis, inflammatory bowel disease (Crohn’s and ulcerative colitis), ankylosing spondylitis and psoriasis.  The TNF inhibitors approved by the FDA are infliximab, adalimumab, etanercept, golimumab, and certrolizumab. These agents can be used by themselves or in combination with other medications such as prednisone, methotrexate, hydroxychloroquine, leflunomide or sulfasalazine.    How Is It Administered?  TNF inhibitors can be administered in two ways. The most common way is by self-injection, with a very small needle that delivers the medication just underneath the skin of the thighs or the abdomen. These medications can be given weekly, every other week, or every 4 weeks.  The second way is via an intravenous infusion every 4-8 weeks. This is done in healthcare facilities and can take a few hours.  Most patients feel better after 2 or 3 doses, but it may take 3 months to see the full benefit.    Side Effects  The most common side effect seen with the injectable drugs are skin reactions, commonly referred to as “injection site reactions.” These reactions can last up to a week. Rarely, severe allergic reactions can occur. The most significant side effects for all of the TNF inhibitors are an increased risk for all types of infections, including tuberculosis (TB) and fungal infections. Sometimes these infections may be severe. TNF inhibitors increase the risk of certain types of skin cancer, so using sun protection is recommended. TNF inhibitors have been associated,  rarely, with multiple sclerosis and drug-induced lupus. They are also known to worsen heart failure.    Tell Your Rheumatology Provider  TNF inhibitors should be held if the patient has a high fever or is being treated with antibiotics for an infection. The medication can be restarted once the infection is cleared. Tell your rheumatologist if you plan to undergo any surgery, because the medications should be held for some time before and after surgery. Patients should talk to their rheumatology provider before getting any vaccinations while using an anti-TNF drug. Some vaccinations are safe, but live vaccines should be avoided. TNF inhibitors are safe for pregnancy and breastfeeding.    Updated February 2024 by Chong Singh MD, and reviewed by the [employer] Communications and Marketing Committee.

## 2025-05-22 NOTE — ASSESSMENT & PLAN NOTE
Discussed that I'm not necessarily surprised that hydroxychloroquine didn't help, he wants to give it one more month before stopping. Low risk profile so I'm okay with this, will defer Matthieu cook for now as he likely won't stay on it long-term    Discussed switching him to another TNFi, risks:benefits, will try for Humira or biosimilar  Orders:    hydroxychloroquine (PLAQUENIL) 200 mg tablet; Take 1.5 tablets (300 mg total) by mouth in the morning

## 2025-05-22 NOTE — TELEPHONE ENCOUNTER
Rheumatology Pre-Certification Request     Medication/Disease State Information:   Diagnosis: Ankylosing spondylitis of multiple sites in spine (HCC) [M45.0]   Medication: adalimumab or biosimilar 40 mg subcutaneous [id number]  Failed or Intolerant to or Contraindicated: etanercept (lost efficacy after 10 years), infliximab (infusion reaction), NSAIDs (NSAID-induced gastritis)  Screening  Renal function: fine  Hepatic function: fine  Hepatitis B: neg  Hepatitis C: neg  Tuberculosis: neg    Cam Emerson DO, CCD, FACR  NPI: [id number]  Lic: [id number]

## 2025-05-22 NOTE — PROGRESS NOTES
Name: Fransisco Ricardo      : 1985      MRN: [id number]  Encounter Provider: Cam Emerson DO  Encounter Date: 2025   Encounter department: [facility]  :  Assessment & Plan  Ankylosing spondylitis of multiple sites in spine (HCC)  Discussed that I'm not necessarily surprised that hydroxychloroquine didn't help, he wants to give it one more month before stopping. Low risk profile so I'm okay with this, will defer Ophtho eval for now as he likely won't stay on it long-term    Discussed switching him to another TNFi, risks:benefits, will try for Humira or biosimilar  Orders:    hydroxychloroquine (PLAQUENIL) 200 mg tablet; Take 1.5 tablets (300 mg total) by mouth in the morning      Patient's rheumatologic disease(s) threaten long-term function if not appropriately managed.    History of Present Illness     Not sure if he feels a difference on hydroxychloroquine    Enbrel seems to only last for a day or two    Significant spinal stiffness compared to when he was on daily meloxicam    Started PT, hoping it will help    Permanent History: AS since his 20s on Enbrel for about 10 years; tried switching to Remicaide in  due to concern for waning efficacy of Enbrel but developed burning sensation with each infusion so decided to switch back to Enbrel. Also on chronic NSAIDs     Mar 2025, had stopped NSAIDs due to ?H pylori with worsening of his stiffness. [place] Enbrel wasn't helping. Discussed switching to another bDMARD, he wanted to try hydroxychloroquine first, did explain that not likely to help with his axial symptoms but given low risk profile started this.     Objective   /58 (BP Location: Right arm, Patient Position: Sitting, Cuff Size: Adult)   Pulse 86   Temp 97.5 °F (36.4 °C) (Temporal)   Ht 6' (1.829 m)   Wt 61.3 kg (135 lb 3.2 oz)   SpO2 96%   BMI 18.34 kg/m²      General: Well appearing, in no distress.   Eyes: Sclera non-icteric. EOMI  Extremities:  Warm, well perfused, no edema.   Neuro: Alert and oriented. No gross focal neurological deficits.   Skin: No rashes.  MSK exam: tenderness to palpation whole spine

## 2025-05-23 NOTE — TELEPHONE ENCOUNTER
PA for Humira 40mg SUBMITTED to [facility]     via    []CMM-KEY:    [x]Surescripts-Case ID # [id number]   []Availity-Auth ID #  NDC #    []Faxed to plan   []Other website    []Phone call Case ID #      [x]PA sent as URGENT    All office notes, labs and other pertaining documents and studies sent. Clinical questions answered. Awaiting determination from insurance company.     Turnaround time for your insurance to make a decision on your Prior Authorization can take 7-21 business days.

## 2025-05-29 ENCOUNTER — APPOINTMENT (OUTPATIENT)
Dept: PHYSICAL THERAPY | Facility: CLINIC | Age: 40
End: 2025-05-29
Payer: COMMERCIAL

## 2025-06-11 ENCOUNTER — OFFICE VISIT (OUTPATIENT)
Dept: PHYSICAL THERAPY | Facility: CLINIC | Age: 40
End: 2025-06-11
Payer: COMMERCIAL

## 2025-06-11 DIAGNOSIS — G89.29 CHRONIC BILATERAL LOW BACK PAIN WITHOUT SCIATICA: ICD-10-CM

## 2025-06-11 DIAGNOSIS — M53.86 DECREASED ROM OF LUMBAR SPINE: ICD-10-CM

## 2025-06-11 DIAGNOSIS — M45.0 ANKYLOSING SPONDYLITIS OF MULTIPLE SITES IN SPINE (HCC): Primary | ICD-10-CM

## 2025-06-11 DIAGNOSIS — M54.50 CHRONIC BILATERAL LOW BACK PAIN WITHOUT SCIATICA: ICD-10-CM

## 2025-06-11 PROCEDURE — 97112 NEUROMUSCULAR REEDUCATION: CPT

## 2025-06-11 PROCEDURE — 97110 THERAPEUTIC EXERCISES: CPT

## 2025-06-11 NOTE — PROGRESS NOTES
"Daily Note     Today's date: 2025  Patient name: Fransisco Ricardo  : 1985  MRN: [id number]  Referring provider: Meka Garcia CRNP  Dx:   Encounter Diagnosis     ICD-10-CM    1. Ankylosing spondylitis of multiple sites in spine (HCC)  M45.0       2. Decreased ROM of lumbar spine  M53.86       3. Chronic bilateral low back pain without sciatica  M54.50     G89.29                      Subjective: Patient reports feeling stiff today.       Objective: See treatment diary below      Assessment: Tolerated treatment well. Session focused on thoracic/lumbar mobility and strengthening. Patient had good tolerance to all exercises. Moderate cueing for hip hinging technique and some mild pain reported. Patient would benefit from continued PT      Plan: Continue per plan of care.      Precautions: OA L hip, ankylosing spondylitis    Pt 1:1 from [phone]  Diagnosis:    Precautions:    POC expiration date:   [employer] Access Code:   *asterisks by exercise = given for HEP   Visit Count  2      Manuals        Lumbar STM prn        Laser                                There Ex        UBE/Nustep  3'/3' UBE      Standing hip flexor stretch HEP       Standing piriformis stretch HEP       Lateral trunk flexion HEP       Doorway pec stretch HEP 10x10\"      Trunk sidebends        UT/LS stretch  Snags 10x3\" ea      Quadruped thread the needle  10x3\"      TB Diagonals  BTB x10ea      Bent over row  10# 3x10      Neuro Re-Ed        Bird dogs Modified HEP       Wall angels HEP 2x10      Wall sits        Paloff press  BTB HEP Blk 10x3\"      Stir the pot        Chin tucks        Deep breathing        Scap retraction        Standing belly press w/PB  15x3\"      Plank  4x15\"      Re-evaluation              Ther Act              Suitcase carry   15# 1 lap ea UE                                       Modalities                                                          "

## 2025-06-18 ENCOUNTER — OFFICE VISIT (OUTPATIENT)
Dept: PHYSICAL THERAPY | Facility: CLINIC | Age: 40
End: 2025-06-18
Payer: COMMERCIAL

## 2025-06-18 DIAGNOSIS — M45.0 ANKYLOSING SPONDYLITIS OF MULTIPLE SITES IN SPINE (HCC): Primary | ICD-10-CM

## 2025-06-18 DIAGNOSIS — M54.50 CHRONIC BILATERAL LOW BACK PAIN WITHOUT SCIATICA: ICD-10-CM

## 2025-06-18 DIAGNOSIS — E55.9 VITAMIN D DEFICIENCY: ICD-10-CM

## 2025-06-18 DIAGNOSIS — M53.86 DECREASED ROM OF LUMBAR SPINE: ICD-10-CM

## 2025-06-18 DIAGNOSIS — G89.29 CHRONIC BILATERAL LOW BACK PAIN WITHOUT SCIATICA: ICD-10-CM

## 2025-06-18 PROCEDURE — 97110 THERAPEUTIC EXERCISES: CPT

## 2025-06-18 PROCEDURE — 97112 NEUROMUSCULAR REEDUCATION: CPT

## 2025-06-18 RX ORDER — ERGOCALCIFEROL 1.25 MG/1
50000 CAPSULE, LIQUID FILLED ORAL WEEKLY
Qty: 8 CAPSULE | Refills: 0 | OUTPATIENT
Start: 2025-06-18

## 2025-06-18 NOTE — PROGRESS NOTES
"Daily Note     Today's date: 2025  Patient name: Fransisco Ricardo  : 1985  MRN: [id number]  Referring provider: Meka Garcia CRNP  Dx:   Encounter Diagnosis     ICD-10-CM    1. Ankylosing spondylitis of multiple sites in spine (HCC)  M45.0       2. Decreased ROM of lumbar spine  M53.86       3. Chronic bilateral low back pain without sciatica  M54.50     G89.29                      Subjective: Patient states he is doing okay today.       Objective: See treatment diary below      Assessment: Tolerated treatment well. Continued POC focused on posterior chain strengthening and stretching. Patient had some difficulty with exercises secondary to lumbar pain and benefited from modifications. Educated patient on benefits of laser and with patient's consent, therapist trialed laser. No adverse response during or post tx. Patient would benefit from continued PT      Plan: Continue per plan of care.      Precautions: OA L hip, ankylosing spondylitis      Diagnosis:    Precautions:    POC expiration date:   [employer] Access Code:   *asterisks by exercise = given for HEP   Visit Count  2 3     Manuals       Lumbar STM prn   AA     Laser   AA arthritis setting 20.5 W                             There Ex        UBE/Nustep  3'/3' UBE 2'/2'     Standing hip flexor stretch HEP  4x15\"Ea     Standing piriformis stretch HEP  4x15\"ea     Lateral trunk flexion HEP  D/C     Doorway pec stretch HEP 10x10\"      Trunk sidebends        UT/LS stretch  Snags 10x3\" ea      Quadruped thread the needle  10x3\" 10x5\"     TB Diagonals  BTB x10ea      Hamstring curl   33# 2x10     Bent over row  10# 3x10 15# 2x10ea     Neuro Re-Ed        Bird dogs Modified HEP  10x5\"ea     Wall angels HEP 2x10 2x10     Wall sits        Paloff press  BTB HEP Blk 10x3\" 10# 10x3\"     Stir the pot   10# 10cw/ccw     Chin tucks        Deep breathing        Scap retraction        Standing belly press w/PB  15x3\"      Standing march   10# 2x10ea   " "  Horizontal abd   Blk 2x10     Plank  4x15\"      Re-evaluation              Ther Act              Suitcase carry   15# 1 lap ea UE                                       Modalities                                                            "

## 2025-06-25 ENCOUNTER — OFFICE VISIT (OUTPATIENT)
Dept: PHYSICAL THERAPY | Facility: CLINIC | Age: 40
End: 2025-06-25
Payer: COMMERCIAL

## 2025-06-25 DIAGNOSIS — G89.29 CHRONIC BILATERAL LOW BACK PAIN WITHOUT SCIATICA: ICD-10-CM

## 2025-06-25 DIAGNOSIS — M54.50 CHRONIC BILATERAL LOW BACK PAIN WITHOUT SCIATICA: ICD-10-CM

## 2025-06-25 DIAGNOSIS — M53.86 DECREASED ROM OF LUMBAR SPINE: ICD-10-CM

## 2025-06-25 DIAGNOSIS — M45.0 ANKYLOSING SPONDYLITIS OF MULTIPLE SITES IN SPINE (HCC): Primary | ICD-10-CM

## 2025-06-25 PROCEDURE — 97140 MANUAL THERAPY 1/> REGIONS: CPT

## 2025-06-25 PROCEDURE — 97112 NEUROMUSCULAR REEDUCATION: CPT

## 2025-06-25 PROCEDURE — 97530 THERAPEUTIC ACTIVITIES: CPT

## 2025-06-25 PROCEDURE — 97110 THERAPEUTIC EXERCISES: CPT

## 2025-06-25 NOTE — PROGRESS NOTES
"Daily Note     Today's date: 2025  Patient name: Fransisco Ricardo  : 1985  MRN: [id number]  Referring provider: Meka Garcia CRNP  Dx:   Encounter Diagnosis     ICD-10-CM    1. Ankylosing spondylitis of multiple sites in spine (HCC)  M45.0       2. Decreased ROM of lumbar spine  M53.86       3. Chronic bilateral low back pain without sciatica  M54.50     G89.29                      Subjective: Patient states back is doing okay but does report pain in the knees.      Objective: See treatment diary below      Assessment: Tolerated treatment well. Continued POC focused on lumbar/thoracic strengthening and mobility/stretching. Patient reported some discomfort of low back with hip hinge and paloff press that was tolerable. Patient would benefit from continued PT      Plan: Continue per plan of care.      Precautions: OA L hip, ankylosing spondylitis      Diagnosis:    Precautions:    POC expiration date:   [employer] Access Code:   *asterisks by exercise = given for HEP   Visit Count  2 3 4    Manuals      Lumbar STM prn   AA     Laser   AA arthritis setting 20.5 W AA arthritis setting 20.5 W - 5'    Hip flexor stretch    4x15\"ea                    There Ex        UBE/Nustep  3'/3' UBE 2'/2' 2'/2'    Standing hip flexor stretch HEP  4x15\"Ea     Standing piriformis stretch HEP  4x15\"ea 4X15\"EA    Lateral trunk flexion HEP  D/C     Doorway pec stretch HEP 10x10\"  10x10\"    Trunk sidebends        UT/LS stretch  Snags 10x3\" ea      Quadruped thread the needle  10x3\" 10x5\" NV    TB Diagonals  BTB x10ea      Hamstring curl/LAQ   33# 2x10 33#  2x10ea    Standing lumbar extension    x10    Lat pulldown    20# 2x10    Bent over row  10# 3x10 15# 2x10ea     Neuro Re-Ed        Bird dogs Modified HEP  10x5\"ea 10x5\"ea    Wall angels HEP 2x10 2x10     Wall sits        Paloff press  BTB HEP Blk 10x3\" 10# 10x3\" W/lateral walk 10# 3 laps ea    Stir the pot   10# 10cw/ccw     Chin tucks        Deep breathing   " "     Scap retraction        Standing belly press w/PB  15x3\"      Standing march   10# 2x10ea     Horizontal abd   Blk 2x10 BTB 2x10    Plank  4x15\"      Re-evaluation              Ther Act              Suitcase carry   15# 1 lap andrew UE         KB carry    2 laps andrew BILL 5#    Box lifts    16# 2x8    Hip hinge       2x10     Modalities                                                              "

## 2025-06-28 ENCOUNTER — TELEPHONE (OUTPATIENT)
Dept: RHEUMATOLOGY | Facility: CLINIC | Age: 40
End: 2025-06-28

## 2025-06-28 DIAGNOSIS — M45.0 ANKYLOSING SPONDYLITIS OF MULTIPLE SITES IN SPINE (HCC): ICD-10-CM

## 2025-06-28 DIAGNOSIS — K21.9 GASTROESOPHAGEAL REFLUX DISEASE WITHOUT ESOPHAGITIS: ICD-10-CM

## 2025-06-30 RX ORDER — HYDROXYCHLOROQUINE SULFATE 200 MG/1
TABLET, FILM COATED ORAL
Qty: 45 TABLET | Refills: 0 | OUTPATIENT
Start: 2025-06-30

## 2025-06-30 RX ORDER — FAMOTIDINE 20 MG/1
20 TABLET, FILM COATED ORAL DAILY
Qty: 90 TABLET | Refills: 1 | Status: SHIPPED | OUTPATIENT
Start: 2025-06-30

## 2025-06-30 NOTE — TELEPHONE ENCOUNTER
Patient stated that he stopped the hydroxychloroquine after taking it another month. Using the generic humira and he stated it seems to be working very well so far.

## 2025-07-03 ENCOUNTER — APPOINTMENT (OUTPATIENT)
Dept: PHYSICAL THERAPY | Facility: CLINIC | Age: 40
End: 2025-07-03
Payer: COMMERCIAL

## 2025-07-09 ENCOUNTER — OFFICE VISIT (OUTPATIENT)
Dept: PHYSICAL THERAPY | Facility: CLINIC | Age: 40
End: 2025-07-09
Payer: COMMERCIAL

## 2025-07-09 DIAGNOSIS — G89.29 CHRONIC BILATERAL LOW BACK PAIN WITHOUT SCIATICA: ICD-10-CM

## 2025-07-09 DIAGNOSIS — M54.50 CHRONIC BILATERAL LOW BACK PAIN WITHOUT SCIATICA: ICD-10-CM

## 2025-07-09 DIAGNOSIS — M53.86 DECREASED ROM OF LUMBAR SPINE: ICD-10-CM

## 2025-07-09 DIAGNOSIS — M45.0 ANKYLOSING SPONDYLITIS OF MULTIPLE SITES IN SPINE (HCC): Primary | ICD-10-CM

## 2025-07-09 PROCEDURE — 97140 MANUAL THERAPY 1/> REGIONS: CPT

## 2025-07-09 PROCEDURE — 97530 THERAPEUTIC ACTIVITIES: CPT

## 2025-07-09 PROCEDURE — 97110 THERAPEUTIC EXERCISES: CPT

## 2025-07-09 PROCEDURE — 97112 NEUROMUSCULAR REEDUCATION: CPT

## 2025-07-09 NOTE — PROGRESS NOTES
"Daily Note     Today's date: 2025  Patient name: Fransisco Ricardo  : 1985  MRN: [id number]  Referring provider: Meka Garcia CRNP  Dx:   Encounter Diagnosis     ICD-10-CM    1. Ankylosing spondylitis of multiple sites in spine (HCC)  M45.0       2. Decreased ROM of lumbar spine  M53.86       3. Chronic bilateral low back pain without sciatica  M54.50     G89.29                      Subjective: Patient reports back has been feeling better. States he was able to do some work without any pain. States he has noticed an improvement in symptoms.      Objective: See treatment diary below      Assessment: Tolerated treatment well. Continued POC focused on improving lumbar mobility and lumbar strength. Progressed planks to include hip abd. Also added in box carry with pt reporting some mild difficulty. No adverse response during or post tx. Patient would benefit from continued PT      Plan: Continue per plan of care.      Precautions: OA L hip, ankylosing spondylitis      Diagnosis:    Precautions:    POC expiration date:   [employer] Access Code:   *asterisks by exercise = given for HEP   Visit Count  2 3 4 5   Manuals     Lumbar STM prn   AA     Laser   AA arthritis setting 20.5 W AA arthritis setting 20.5 W - 5' AA arthritis setting 20.5 W - 5'   Hip flexor stretch    4x15\"ea 4x15\"ea                   There Ex        UBE/Nustep  3'/3' UBE 2'/2' 2'/2' 2'/2'   Standing hip flexor stretch HEP  4x15\"Ea     Standing piriformis stretch HEP  4x15\"ea 4X15\"EA    Lateral trunk flexion HEP  D/C     Doorway pec stretch HEP 10x10\"  10x10\" 10x10\"   Trunk sidebends        UT/LS stretch  Snags 10x3\" ea      Quadruped thread the needle  10x3\" 10x5\" NV 15xea   TB Diagonals  BTB x10ea   BTB x15ea   Hamstring curl/LAQ   33# 2x10 33#  2x10ea 33# 2x10ea   Standing lumbar extension    x10    Lat pulldown    20# 2x10 Blk Tb 2x10   Bent over row  10# 3x10 15# 2x10ea     Neuro Re-Ed        Bird dogs Modified HEP  " "10x5\"ea 10x5\"ea 10x3\"ea   Wall angels HEP 2x10 2x10  2x10   Wall sits        Paloff press  BTB HEP Blk 10x3\" 10# 10x3\" W/lateral walk 10# 3 laps ea W/lateral walk BTB 3 laps ea   Stir the pot   10# 10cw/ccw     Chin tucks        Deep breathing        Scap retraction        Standing belly press w/PB  15x3\"      Standing march   10# 2x10ea     Horizontal abd   Blk 2x10 BTB 2x10    Plank  4x15\"   30\"; x10ea w/hip abd RTB   Re-evaluation              Ther Act              Suitcase carry   15# 1 lap ea UE         KB carry    2 laps ea UE 5# 2 laps ea UE 5#   Box lifts    16# 2x8    Box carry     16 lns 2 laps   Hip hinge       2x10  2x10   Modalities                                                                "

## (undated) DEVICE — BLADE 4MM BANANA S/SU

## (undated) DEVICE — GLOVE INDICATOR PI UNDERGLOVE SZ 8.5 BLUE

## (undated) DEVICE — TUBING ARTHROSCOPY REDUCE PATIENT

## (undated) DEVICE — 3M™ MICROFOAM™ SURGICAL TAPE 4 ROLLS/CARTON 6 CARTONS/CASE 1528-3: Brand: 3M™ MICROFOAM™

## (undated) DEVICE — GLOVE SRG BIOGEL 8.5

## (undated) DEVICE — ARTHROSCOPY FLOOR MAT

## (undated) DEVICE — VAPR COOLPULSE 90 ELECTRODE 90 DEGREES SUCTION WITH INTEGRATED HANDPIECE: Brand: VAPR COOLPULSE

## (undated) DEVICE — STERISTRIP

## (undated) DEVICE — DRAPE C-ARM X-RAY

## (undated) DEVICE — LIGHT HANDLE COVER SLEEVE DISP BLUE STELLAR

## (undated) DEVICE — 3M™ STERI-STRIP™ REINFORCED ADHESIVE SKIN CLOSURES, R1547, 1/2 IN X 4 IN (12 MM X 100 MM), 6 STRIPS/ENVELOPE: Brand: 3M™ STERI-STRIP™

## (undated) DEVICE — 6617 IOBAN II PATIENT ISOLATION DRAPE 5/BX,4BX/CS: Brand: STERI-DRAPE™ IOBAN™ 2

## (undated) DEVICE — INTENDED FOR TISSUE SEPARATION, AND OTHER PROCEDURES THAT REQUIRE A SHARP SURGICAL BLADE TO PUNCTURE OR CUT.: Brand: BARD-PARKER ® CARBON RIB-BACK BLADES

## (undated) DEVICE — DISPOSABLE HIB PAC INCLUDES 3                                    GUIDEWIRES AND 2 ARTHROSCOPY NEEDLES

## (undated) DEVICE — PACK UNIVERSAL ARTHRSCOPY PBDS

## (undated) DEVICE — PAD CAST 6 IN COTTON NON STERILE

## (undated) DEVICE — TUBING SUCTION 5MM X 12 FT

## (undated) DEVICE — GAUZE SPONGES,16 PLY: Brand: CURITY

## (undated) DEVICE — SYRINGE 20ML LL

## (undated) DEVICE — ABDOMINAL PAD: Brand: DERMACEA

## (undated) DEVICE — LIGHT GLOVE GREEN

## (undated) DEVICE — SUT MONOCRYL 3-0 PS-2 18 IN Y497G

## (undated) DEVICE — CHLORAPREP HI-LITE 26ML ORANGE

## (undated) DEVICE — PUDDLE VAC